# Patient Record
Sex: MALE | Race: WHITE | Employment: PART TIME | URBAN - METROPOLITAN AREA
[De-identification: names, ages, dates, MRNs, and addresses within clinical notes are randomized per-mention and may not be internally consistent; named-entity substitution may affect disease eponyms.]

---

## 2017-01-18 ENCOUNTER — TELEPHONE (OUTPATIENT)
Dept: INTERNAL MEDICINE CLINIC | Age: 77
End: 2017-01-18

## 2017-01-18 RX ORDER — SPIRONOLACTONE 25 MG/1
TABLET ORAL
Qty: 90 TABLET | Refills: 1 | Status: SHIPPED | OUTPATIENT
Start: 2017-01-18 | End: 2017-06-26 | Stop reason: SDUPTHER

## 2017-01-18 RX ORDER — FUROSEMIDE 40 MG/1
TABLET ORAL
Qty: 90 TABLET | Refills: 1 | Status: SHIPPED | OUTPATIENT
Start: 2017-01-18 | End: 2017-02-22 | Stop reason: DRUGHIGH

## 2017-01-18 RX ORDER — CARVEDILOL 25 MG/1
25 TABLET ORAL 2 TIMES DAILY WITH MEALS
Qty: 180 TABLET | Refills: 1 | Status: SHIPPED | OUTPATIENT
Start: 2017-01-18 | End: 2017-06-26 | Stop reason: SDUPTHER

## 2017-01-18 RX ORDER — ATORVASTATIN CALCIUM 80 MG/1
80 TABLET, FILM COATED ORAL DAILY
Qty: 90 TABLET | Refills: 1 | Status: SHIPPED | OUTPATIENT
Start: 2017-01-18 | End: 2017-06-26 | Stop reason: SDUPTHER

## 2017-01-18 RX ORDER — AMIODARONE HYDROCHLORIDE 200 MG/1
200 TABLET ORAL DAILY
Qty: 90 TABLET | Refills: 1 | Status: SHIPPED | OUTPATIENT
Start: 2017-01-18 | End: 2017-05-26 | Stop reason: DRUGHIGH

## 2017-02-22 ENCOUNTER — TELEPHONE (OUTPATIENT)
Dept: INTERNAL MEDICINE CLINIC | Age: 77
End: 2017-02-22

## 2017-02-22 ENCOUNTER — OFFICE VISIT (OUTPATIENT)
Dept: INTERNAL MEDICINE CLINIC | Age: 77
End: 2017-02-22

## 2017-02-22 VITALS
WEIGHT: 223 LBS | DIASTOLIC BLOOD PRESSURE: 64 MMHG | HEART RATE: 54 BPM | RESPIRATION RATE: 16 BRPM | BODY MASS INDEX: 35.99 KG/M2 | SYSTOLIC BLOOD PRESSURE: 114 MMHG

## 2017-02-22 DIAGNOSIS — Z12.5 SPECIAL SCREENING FOR MALIGNANT NEOPLASM OF PROSTATE: ICD-10-CM

## 2017-02-22 DIAGNOSIS — I50.32 CHRONIC DIASTOLIC CHF (CONGESTIVE HEART FAILURE) (HCC): ICD-10-CM

## 2017-02-22 DIAGNOSIS — I48.0 PAROXYSMAL ATRIAL FIBRILLATION (HCC): ICD-10-CM

## 2017-02-22 DIAGNOSIS — R73.9 HYPERGLYCEMIA: ICD-10-CM

## 2017-02-22 DIAGNOSIS — I10 ESSENTIAL HYPERTENSION: Primary | ICD-10-CM

## 2017-02-22 LAB
ANION GAP SERPL CALCULATED.3IONS-SCNC: 16 MMOL/L (ref 3–16)
BASOPHILS ABSOLUTE: 0 K/UL (ref 0–0.2)
BASOPHILS RELATIVE PERCENT: 0.4 %
BUN BLDV-MCNC: 31 MG/DL (ref 7–20)
CALCIUM SERPL-MCNC: 8.9 MG/DL (ref 8.3–10.6)
CHLORIDE BLD-SCNC: 99 MMOL/L (ref 99–110)
CO2: 25 MMOL/L (ref 21–32)
CREAT SERPL-MCNC: 1.5 MG/DL (ref 0.8–1.3)
EOSINOPHILS ABSOLUTE: 0.2 K/UL (ref 0–0.6)
EOSINOPHILS RELATIVE PERCENT: 2.6 %
GFR AFRICAN AMERICAN: 55
GFR NON-AFRICAN AMERICAN: 46
GLUCOSE BLD-MCNC: 131 MG/DL (ref 70–99)
HBA1C MFR BLD: 6.3 %
HCT VFR BLD CALC: 44.4 % (ref 40.5–52.5)
HEMOGLOBIN: 14.7 G/DL (ref 13.5–17.5)
LYMPHOCYTES ABSOLUTE: 2 K/UL (ref 1–5.1)
LYMPHOCYTES RELATIVE PERCENT: 22.7 %
MCH RBC QN AUTO: 31.9 PG (ref 26–34)
MCHC RBC AUTO-ENTMCNC: 33.1 G/DL (ref 31–36)
MCV RBC AUTO: 96.2 FL (ref 80–100)
MONOCYTES ABSOLUTE: 0.9 K/UL (ref 0–1.3)
MONOCYTES RELATIVE PERCENT: 10.6 %
NEUTROPHILS ABSOLUTE: 5.7 K/UL (ref 1.7–7.7)
NEUTROPHILS RELATIVE PERCENT: 63.7 %
PDW BLD-RTO: 13.4 % (ref 12.4–15.4)
PLATELET # BLD: 136 K/UL (ref 135–450)
PMV BLD AUTO: 10.2 FL (ref 5–10.5)
POTASSIUM SERPL-SCNC: 4.4 MMOL/L (ref 3.5–5.1)
PROSTATE SPECIFIC ANTIGEN: 0.64 NG/ML (ref 0–4)
RBC # BLD: 4.62 M/UL (ref 4.2–5.9)
SODIUM BLD-SCNC: 140 MMOL/L (ref 136–145)
WBC # BLD: 8.9 K/UL (ref 4–11)

## 2017-02-22 PROCEDURE — 36415 COLL VENOUS BLD VENIPUNCTURE: CPT | Performed by: INTERNAL MEDICINE

## 2017-02-22 PROCEDURE — 83036 HEMOGLOBIN GLYCOSYLATED A1C: CPT | Performed by: INTERNAL MEDICINE

## 2017-02-22 PROCEDURE — 99213 OFFICE O/P EST LOW 20 MIN: CPT | Performed by: INTERNAL MEDICINE

## 2017-02-22 RX ORDER — FUROSEMIDE 20 MG/1
TABLET ORAL
Qty: 1 TABLET | Refills: 0 | Status: SHIPPED | OUTPATIENT
Start: 2017-02-22 | End: 2017-10-10 | Stop reason: SDUPTHER

## 2017-02-22 ASSESSMENT — ENCOUNTER SYMPTOMS
SORE THROAT: 0
SHORTNESS OF BREATH: 0
COUGH: 0
CHEST TIGHTNESS: 0
WHEEZING: 0
TROUBLE SWALLOWING: 0
GASTROINTESTINAL NEGATIVE: 1

## 2017-02-23 ENCOUNTER — TELEPHONE (OUTPATIENT)
Dept: INTERNAL MEDICINE CLINIC | Age: 77
End: 2017-02-23

## 2017-05-02 ENCOUNTER — HOSPITAL ENCOUNTER (OUTPATIENT)
Dept: ENDOSCOPY | Age: 77
Discharge: HOME | End: 2017-05-02
Attending: INTERNAL MEDICINE | Admitting: INTERNAL MEDICINE

## 2017-05-02 VITALS
SYSTOLIC BLOOD PRESSURE: 123 MMHG | OXYGEN SATURATION: 95 % | TEMPERATURE: 97.1 F | DIASTOLIC BLOOD PRESSURE: 54 MMHG | RESPIRATION RATE: 16 BRPM | HEART RATE: 46 BPM

## 2017-05-02 RX ORDER — SODIUM CHLORIDE 9 MG/ML
INJECTION, SOLUTION INTRAVENOUS CONTINUOUS
Status: DISCONTINUED | OUTPATIENT
Start: 2017-05-02 | End: 2017-05-03 | Stop reason: HOSPADM

## 2017-05-02 RX ADMIN — SODIUM CHLORIDE: 9 INJECTION, SOLUTION INTRAVENOUS at 07:39

## 2017-05-02 ASSESSMENT — PAIN SCALES - GENERAL
PAINLEVEL_OUTOF10: 0
PAINLEVEL_OUTOF10: 0

## 2017-05-02 ASSESSMENT — PAIN - FUNCTIONAL ASSESSMENT: PAIN_FUNCTIONAL_ASSESSMENT: 0-10

## 2017-05-18 ENCOUNTER — TELEPHONE (OUTPATIENT)
Dept: INTERNAL MEDICINE CLINIC | Age: 77
End: 2017-05-18

## 2017-05-26 ENCOUNTER — OFFICE VISIT (OUTPATIENT)
Dept: INTERNAL MEDICINE CLINIC | Age: 77
End: 2017-05-26

## 2017-05-26 VITALS
WEIGHT: 233 LBS | DIASTOLIC BLOOD PRESSURE: 70 MMHG | HEART RATE: 47 BPM | BODY MASS INDEX: 39.99 KG/M2 | RESPIRATION RATE: 16 BRPM | SYSTOLIC BLOOD PRESSURE: 110 MMHG | OXYGEN SATURATION: 96 %

## 2017-05-26 DIAGNOSIS — I48.0 PAROXYSMAL ATRIAL FIBRILLATION (HCC): ICD-10-CM

## 2017-05-26 DIAGNOSIS — I47.1 SVT (SUPRAVENTRICULAR TACHYCARDIA) (HCC): ICD-10-CM

## 2017-05-26 DIAGNOSIS — I10 ESSENTIAL HYPERTENSION: Primary | ICD-10-CM

## 2017-05-26 PROCEDURE — 3288F FALL RISK ASSESSMENT DOCD: CPT | Performed by: INTERNAL MEDICINE

## 2017-05-26 PROCEDURE — 99213 OFFICE O/P EST LOW 20 MIN: CPT | Performed by: INTERNAL MEDICINE

## 2017-05-26 RX ORDER — AMIODARONE HYDROCHLORIDE 200 MG/1
100 TABLET ORAL DAILY
Qty: 1 TABLET | Refills: 0 | Status: SHIPPED
Start: 2017-05-26 | End: 2017-06-26 | Stop reason: SDUPTHER

## 2017-05-26 ASSESSMENT — ENCOUNTER SYMPTOMS
SHORTNESS OF BREATH: 0
GASTROINTESTINAL NEGATIVE: 1
CHEST TIGHTNESS: 0
COUGH: 0
WHEEZING: 0

## 2017-06-26 RX ORDER — CARVEDILOL 25 MG/1
TABLET ORAL
Qty: 180 TABLET | Refills: 1 | Status: SHIPPED | OUTPATIENT
Start: 2017-06-26 | End: 2017-11-14 | Stop reason: SDUPTHER

## 2017-06-26 RX ORDER — SPIRONOLACTONE 25 MG/1
TABLET ORAL
Qty: 90 TABLET | Refills: 1 | Status: SHIPPED | OUTPATIENT
Start: 2017-06-26 | End: 2017-11-14 | Stop reason: SDUPTHER

## 2017-06-26 RX ORDER — AMIODARONE HYDROCHLORIDE 200 MG/1
TABLET ORAL
Qty: 90 TABLET | Refills: 1 | Status: SHIPPED | OUTPATIENT
Start: 2017-06-26 | End: 2017-11-14 | Stop reason: SDUPTHER

## 2017-06-26 RX ORDER — ATORVASTATIN CALCIUM 80 MG/1
TABLET, FILM COATED ORAL
Qty: 90 TABLET | Refills: 1 | Status: SHIPPED | OUTPATIENT
Start: 2017-06-26 | End: 2017-11-14 | Stop reason: SDUPTHER

## 2017-08-03 ENCOUNTER — TELEPHONE (OUTPATIENT)
Dept: INTERNAL MEDICINE CLINIC | Age: 77
End: 2017-08-03

## 2017-09-01 ENCOUNTER — OFFICE VISIT (OUTPATIENT)
Dept: INTERNAL MEDICINE CLINIC | Age: 77
End: 2017-09-01

## 2017-09-01 VITALS
HEIGHT: 64 IN | BODY MASS INDEX: 39.78 KG/M2 | SYSTOLIC BLOOD PRESSURE: 130 MMHG | DIASTOLIC BLOOD PRESSURE: 70 MMHG | HEART RATE: 60 BPM | OXYGEN SATURATION: 95 % | RESPIRATION RATE: 16 BRPM | WEIGHT: 233 LBS

## 2017-09-01 DIAGNOSIS — I10 ESSENTIAL HYPERTENSION: Primary | ICD-10-CM

## 2017-09-01 DIAGNOSIS — R73.9 HYPERGLYCEMIA: ICD-10-CM

## 2017-09-01 DIAGNOSIS — I50.32 CHRONIC DIASTOLIC CHF (CONGESTIVE HEART FAILURE) (HCC): ICD-10-CM

## 2017-09-01 LAB — HBA1C MFR BLD: 6.4 %

## 2017-09-01 PROCEDURE — G8510 SCR DEP NEG, NO PLAN REQD: HCPCS | Performed by: INTERNAL MEDICINE

## 2017-09-01 PROCEDURE — 83036 HEMOGLOBIN GLYCOSYLATED A1C: CPT | Performed by: INTERNAL MEDICINE

## 2017-09-01 PROCEDURE — 99213 OFFICE O/P EST LOW 20 MIN: CPT | Performed by: INTERNAL MEDICINE

## 2017-09-01 ASSESSMENT — ENCOUNTER SYMPTOMS
COUGH: 0
SHORTNESS OF BREATH: 0
WHEEZING: 0
CHEST TIGHTNESS: 0
TROUBLE SWALLOWING: 0
GASTROINTESTINAL NEGATIVE: 1

## 2017-09-01 ASSESSMENT — PATIENT HEALTH QUESTIONNAIRE - PHQ9
1. LITTLE INTEREST OR PLEASURE IN DOING THINGS: 0
2. FEELING DOWN, DEPRESSED OR HOPELESS: 0
SUM OF ALL RESPONSES TO PHQ9 QUESTIONS 1 & 2: 0
SUM OF ALL RESPONSES TO PHQ QUESTIONS 1-9: 0

## 2017-09-06 ENCOUNTER — TELEPHONE (OUTPATIENT)
Dept: INTERNAL MEDICINE CLINIC | Age: 77
End: 2017-09-06

## 2017-09-06 RX ORDER — DICLOFENAC SODIUM 75 MG/1
75 TABLET, DELAYED RELEASE ORAL 2 TIMES DAILY PRN
Qty: 60 TABLET | Refills: 0 | Status: SHIPPED | OUTPATIENT
Start: 2017-09-06 | End: 2018-02-22 | Stop reason: SDUPTHER

## 2017-10-10 ENCOUNTER — TELEPHONE (OUTPATIENT)
Dept: INTERNAL MEDICINE CLINIC | Age: 77
End: 2017-10-10

## 2017-10-11 RX ORDER — FUROSEMIDE 20 MG/1
TABLET ORAL
Qty: 30 TABLET | Refills: 5 | Status: SHIPPED | OUTPATIENT
Start: 2017-10-11 | End: 2018-03-06 | Stop reason: SDUPTHER

## 2017-11-01 ENCOUNTER — NURSE ONLY (OUTPATIENT)
Dept: INTERNAL MEDICINE CLINIC | Age: 77
End: 2017-11-01

## 2017-11-01 DIAGNOSIS — Z23 FLU VACCINE NEED: Primary | ICD-10-CM

## 2017-11-01 PROCEDURE — 90662 IIV NO PRSV INCREASED AG IM: CPT | Performed by: INTERNAL MEDICINE

## 2017-11-01 PROCEDURE — G0008 ADMIN INFLUENZA VIRUS VAC: HCPCS | Performed by: INTERNAL MEDICINE

## 2017-11-14 RX ORDER — AMIODARONE HYDROCHLORIDE 200 MG/1
TABLET ORAL
Qty: 90 TABLET | Refills: 1 | Status: SHIPPED | OUTPATIENT
Start: 2017-11-14 | End: 2018-04-07 | Stop reason: SDUPTHER

## 2017-11-14 RX ORDER — SPIRONOLACTONE 25 MG/1
TABLET ORAL
Qty: 90 TABLET | Refills: 1 | Status: SHIPPED | OUTPATIENT
Start: 2017-11-14 | End: 2018-04-07 | Stop reason: SDUPTHER

## 2017-11-14 RX ORDER — CARVEDILOL 25 MG/1
TABLET ORAL
Qty: 180 TABLET | Refills: 1 | Status: SHIPPED | OUTPATIENT
Start: 2017-11-14 | End: 2018-04-07 | Stop reason: SDUPTHER

## 2017-11-14 RX ORDER — ATORVASTATIN CALCIUM 80 MG/1
TABLET, FILM COATED ORAL
Qty: 90 TABLET | Refills: 1 | Status: SHIPPED | OUTPATIENT
Start: 2017-11-14 | End: 2018-04-07 | Stop reason: SDUPTHER

## 2017-12-06 ENCOUNTER — OFFICE VISIT (OUTPATIENT)
Dept: INTERNAL MEDICINE CLINIC | Age: 77
End: 2017-12-06

## 2017-12-06 VITALS
WEIGHT: 234 LBS | HEIGHT: 64 IN | RESPIRATION RATE: 16 BRPM | DIASTOLIC BLOOD PRESSURE: 80 MMHG | SYSTOLIC BLOOD PRESSURE: 120 MMHG | OXYGEN SATURATION: 96 % | HEART RATE: 76 BPM | BODY MASS INDEX: 39.95 KG/M2

## 2017-12-06 DIAGNOSIS — I48.0 PAROXYSMAL ATRIAL FIBRILLATION (HCC): ICD-10-CM

## 2017-12-06 DIAGNOSIS — I10 ESSENTIAL HYPERTENSION: Primary | ICD-10-CM

## 2017-12-06 DIAGNOSIS — I50.32 CHRONIC DIASTOLIC CHF (CONGESTIVE HEART FAILURE) (HCC): ICD-10-CM

## 2017-12-06 DIAGNOSIS — Z12.5 SPECIAL SCREENING FOR MALIGNANT NEOPLASM OF PROSTATE: ICD-10-CM

## 2017-12-06 DIAGNOSIS — E78.5 HYPERLIPIDEMIA, UNSPECIFIED HYPERLIPIDEMIA TYPE: ICD-10-CM

## 2017-12-06 PROCEDURE — 3017F COLORECTAL CA SCREEN DOC REV: CPT | Performed by: INTERNAL MEDICINE

## 2017-12-06 PROCEDURE — 4040F PNEUMOC VAC/ADMIN/RCVD: CPT | Performed by: INTERNAL MEDICINE

## 2017-12-06 PROCEDURE — 1123F ACP DISCUSS/DSCN MKR DOCD: CPT | Performed by: INTERNAL MEDICINE

## 2017-12-06 PROCEDURE — 1036F TOBACCO NON-USER: CPT | Performed by: INTERNAL MEDICINE

## 2017-12-06 PROCEDURE — G8427 DOCREV CUR MEDS BY ELIG CLIN: HCPCS | Performed by: INTERNAL MEDICINE

## 2017-12-06 PROCEDURE — 99213 OFFICE O/P EST LOW 20 MIN: CPT | Performed by: INTERNAL MEDICINE

## 2017-12-06 PROCEDURE — G8484 FLU IMMUNIZE NO ADMIN: HCPCS | Performed by: INTERNAL MEDICINE

## 2017-12-06 PROCEDURE — G8417 CALC BMI ABV UP PARAM F/U: HCPCS | Performed by: INTERNAL MEDICINE

## 2017-12-06 ASSESSMENT — ENCOUNTER SYMPTOMS
SHORTNESS OF BREATH: 0
WHEEZING: 0
GASTROINTESTINAL NEGATIVE: 1
CHEST TIGHTNESS: 0
COUGH: 0

## 2017-12-06 NOTE — PROGRESS NOTES
Subjective:      Patient ID: Bishop Mckeon is a 78 y.o. male.htn chf and afib    HPI  Feels fien and ha s no new symptoms  Ha s no symptoms with activity  Has no orthopnea or pnd  Has no other cp gi or gu symptoms  Review of Systems   Constitutional: Negative for activity change, appetite change and unexpected weight change. Respiratory: Negative for cough, chest tightness, shortness of breath and wheezing. Cardiovascular: Negative for chest pain, palpitations and leg swelling. Gastrointestinal: Negative. Genitourinary: Negative. Neurological: Negative for dizziness, light-headedness and headaches. Objective:   Physical Exam   Constitutional: He is oriented to person, place, and time. No distress. Eyes: Conjunctivae and EOM are normal. Pupils are equal, round, and reactive to light. No scleral icterus. Neck: No JVD present. No thyromegaly present. Cardiovascular: Normal rate, regular rhythm, normal heart sounds and intact distal pulses. Exam reveals no gallop. No murmur heard. Pulmonary/Chest: Breath sounds normal. No respiratory distress. He has no wheezes. He has no rales. Musculoskeletal: He exhibits no edema. Lymphadenopathy:     He has no cervical adenopathy. Neurological: He is alert and oriented to person, place, and time. Nursing note and vitals reviewed. Assessment:      htn controlled  Hyperglycemia stable with A1C 6/4 and ha snot incraesed   afib- in regular rhythm and on amiodarone  chf well compensated   Plan:       Will check labs =fasting   Continue current medications and see in  3 months

## 2017-12-08 ENCOUNTER — NURSE ONLY (OUTPATIENT)
Dept: INTERNAL MEDICINE CLINIC | Age: 77
End: 2017-12-08

## 2017-12-08 DIAGNOSIS — I48.0 PAROXYSMAL ATRIAL FIBRILLATION (HCC): ICD-10-CM

## 2017-12-08 DIAGNOSIS — Z12.5 SPECIAL SCREENING FOR MALIGNANT NEOPLASM OF PROSTATE: ICD-10-CM

## 2017-12-08 DIAGNOSIS — I10 ESSENTIAL HYPERTENSION: ICD-10-CM

## 2017-12-08 DIAGNOSIS — E78.5 HYPERLIPIDEMIA, UNSPECIFIED HYPERLIPIDEMIA TYPE: ICD-10-CM

## 2017-12-08 LAB
ALBUMIN SERPL-MCNC: 4.1 G/DL (ref 3.4–5)
ALP BLD-CCNC: 56 U/L (ref 40–129)
ALT SERPL-CCNC: 15 U/L (ref 10–40)
ANION GAP SERPL CALCULATED.3IONS-SCNC: 13 MMOL/L (ref 3–16)
AST SERPL-CCNC: 14 U/L (ref 15–37)
BASOPHILS ABSOLUTE: 0 K/UL (ref 0–0.2)
BASOPHILS RELATIVE PERCENT: 0.5 %
BILIRUB SERPL-MCNC: 0.5 MG/DL (ref 0–1)
BILIRUBIN DIRECT: <0.2 MG/DL (ref 0–0.3)
BILIRUBIN, INDIRECT: ABNORMAL MG/DL (ref 0–1)
BUN BLDV-MCNC: 32 MG/DL (ref 7–20)
CALCIUM SERPL-MCNC: 8.8 MG/DL (ref 8.3–10.6)
CHLORIDE BLD-SCNC: 104 MMOL/L (ref 99–110)
CHOLESTEROL, TOTAL: 145 MG/DL (ref 0–199)
CO2: 26 MMOL/L (ref 21–32)
CREAT SERPL-MCNC: 1.3 MG/DL (ref 0.8–1.3)
EOSINOPHILS ABSOLUTE: 0.2 K/UL (ref 0–0.6)
EOSINOPHILS RELATIVE PERCENT: 3 %
GFR AFRICAN AMERICAN: >60
GFR NON-AFRICAN AMERICAN: 54
GLUCOSE BLD-MCNC: 137 MG/DL (ref 70–99)
HCT VFR BLD CALC: 43.2 % (ref 40.5–52.5)
HDLC SERPL-MCNC: 43 MG/DL (ref 40–60)
HEMOGLOBIN: 14.3 G/DL (ref 13.5–17.5)
LDL CHOLESTEROL CALCULATED: 72 MG/DL
LYMPHOCYTES ABSOLUTE: 1.6 K/UL (ref 1–5.1)
LYMPHOCYTES RELATIVE PERCENT: 24 %
MCH RBC QN AUTO: 31.6 PG (ref 26–34)
MCHC RBC AUTO-ENTMCNC: 33.1 G/DL (ref 31–36)
MCV RBC AUTO: 95.4 FL (ref 80–100)
MONOCYTES ABSOLUTE: 0.9 K/UL (ref 0–1.3)
MONOCYTES RELATIVE PERCENT: 12.9 %
NEUTROPHILS ABSOLUTE: 3.9 K/UL (ref 1.7–7.7)
NEUTROPHILS RELATIVE PERCENT: 59.6 %
PDW BLD-RTO: 13.7 % (ref 12.4–15.4)
PLATELET # BLD: 139 K/UL (ref 135–450)
PMV BLD AUTO: 9.9 FL (ref 5–10.5)
POTASSIUM SERPL-SCNC: 4.2 MMOL/L (ref 3.5–5.1)
PROSTATE SPECIFIC ANTIGEN: 0.5 NG/ML (ref 0–4)
RBC # BLD: 4.53 M/UL (ref 4.2–5.9)
SODIUM BLD-SCNC: 143 MMOL/L (ref 136–145)
TOTAL PROTEIN: 6.7 G/DL (ref 6.4–8.2)
TRIGL SERPL-MCNC: 150 MG/DL (ref 0–150)
TSH SERPL DL<=0.05 MIU/L-ACNC: 3.46 UIU/ML (ref 0.27–4.2)
VLDLC SERPL CALC-MCNC: 30 MG/DL
WBC # BLD: 6.6 K/UL (ref 4–11)

## 2017-12-08 PROCEDURE — 36415 COLL VENOUS BLD VENIPUNCTURE: CPT | Performed by: INTERNAL MEDICINE

## 2018-02-22 RX ORDER — DICLOFENAC SODIUM 75 MG/1
75 TABLET, DELAYED RELEASE ORAL 2 TIMES DAILY PRN
Qty: 60 TABLET | Refills: 0 | Status: SHIPPED | OUTPATIENT
Start: 2018-02-22 | End: 2018-06-22 | Stop reason: SDUPTHER

## 2018-03-06 RX ORDER — FUROSEMIDE 20 MG/1
TABLET ORAL
Qty: 90 TABLET | Refills: 1 | Status: SHIPPED | OUTPATIENT
Start: 2018-03-06 | End: 2018-03-16 | Stop reason: SINTOL

## 2018-03-16 ENCOUNTER — OFFICE VISIT (OUTPATIENT)
Dept: INTERNAL MEDICINE CLINIC | Age: 78
End: 2018-03-16

## 2018-03-16 VITALS
WEIGHT: 235 LBS | HEART RATE: 60 BPM | HEIGHT: 64 IN | BODY MASS INDEX: 40.12 KG/M2 | OXYGEN SATURATION: 97 % | SYSTOLIC BLOOD PRESSURE: 120 MMHG | RESPIRATION RATE: 18 BRPM | DIASTOLIC BLOOD PRESSURE: 50 MMHG

## 2018-03-16 DIAGNOSIS — I10 ESSENTIAL HYPERTENSION: Primary | ICD-10-CM

## 2018-03-16 DIAGNOSIS — I48.0 PAROXYSMAL ATRIAL FIBRILLATION (HCC): ICD-10-CM

## 2018-03-16 DIAGNOSIS — I47.1 SVT (SUPRAVENTRICULAR TACHYCARDIA) (HCC): ICD-10-CM

## 2018-03-16 DIAGNOSIS — I50.32 CHRONIC DIASTOLIC CHF (CONGESTIVE HEART FAILURE) (HCC): ICD-10-CM

## 2018-03-16 PROCEDURE — 4040F PNEUMOC VAC/ADMIN/RCVD: CPT | Performed by: INTERNAL MEDICINE

## 2018-03-16 PROCEDURE — G8427 DOCREV CUR MEDS BY ELIG CLIN: HCPCS | Performed by: INTERNAL MEDICINE

## 2018-03-16 PROCEDURE — 3017F COLORECTAL CA SCREEN DOC REV: CPT | Performed by: INTERNAL MEDICINE

## 2018-03-16 PROCEDURE — 99213 OFFICE O/P EST LOW 20 MIN: CPT | Performed by: INTERNAL MEDICINE

## 2018-03-16 PROCEDURE — G8482 FLU IMMUNIZE ORDER/ADMIN: HCPCS | Performed by: INTERNAL MEDICINE

## 2018-03-16 PROCEDURE — 1036F TOBACCO NON-USER: CPT | Performed by: INTERNAL MEDICINE

## 2018-03-16 PROCEDURE — G8417 CALC BMI ABV UP PARAM F/U: HCPCS | Performed by: INTERNAL MEDICINE

## 2018-03-16 PROCEDURE — 1123F ACP DISCUSS/DSCN MKR DOCD: CPT | Performed by: INTERNAL MEDICINE

## 2018-03-16 ASSESSMENT — ENCOUNTER SYMPTOMS
WHEEZING: 0
GASTROINTESTINAL NEGATIVE: 1
COUGH: 0
SHORTNESS OF BREATH: 0
CHEST TIGHTNESS: 0

## 2018-04-09 RX ORDER — ATORVASTATIN CALCIUM 80 MG/1
TABLET, FILM COATED ORAL
Qty: 90 TABLET | Refills: 3 | Status: SHIPPED | OUTPATIENT
Start: 2018-04-09 | End: 2019-02-26 | Stop reason: SDUPTHER

## 2018-04-09 RX ORDER — AMIODARONE HYDROCHLORIDE 200 MG/1
TABLET ORAL
Qty: 90 TABLET | Refills: 3 | Status: SHIPPED | OUTPATIENT
Start: 2018-04-09 | End: 2019-02-26 | Stop reason: SDUPTHER

## 2018-04-09 RX ORDER — CARVEDILOL 25 MG/1
TABLET ORAL
Qty: 180 TABLET | Refills: 3 | Status: SHIPPED | OUTPATIENT
Start: 2018-04-09 | End: 2019-02-26 | Stop reason: SDUPTHER

## 2018-04-09 RX ORDER — SPIRONOLACTONE 25 MG/1
TABLET ORAL
Qty: 90 TABLET | Refills: 3 | Status: SHIPPED | OUTPATIENT
Start: 2018-04-09 | End: 2019-02-26 | Stop reason: SDUPTHER

## 2018-05-11 ENCOUNTER — OFFICE VISIT (OUTPATIENT)
Dept: INTERNAL MEDICINE CLINIC | Age: 78
End: 2018-05-11

## 2018-05-11 VITALS
HEIGHT: 64 IN | WEIGHT: 231 LBS | RESPIRATION RATE: 16 BRPM | SYSTOLIC BLOOD PRESSURE: 118 MMHG | BODY MASS INDEX: 39.44 KG/M2 | HEART RATE: 74 BPM | OXYGEN SATURATION: 96 % | DIASTOLIC BLOOD PRESSURE: 62 MMHG

## 2018-05-11 DIAGNOSIS — J98.01 BRONCHOSPASM: Primary | ICD-10-CM

## 2018-05-11 PROCEDURE — G8417 CALC BMI ABV UP PARAM F/U: HCPCS | Performed by: INTERNAL MEDICINE

## 2018-05-11 PROCEDURE — 3017F COLORECTAL CA SCREEN DOC REV: CPT | Performed by: INTERNAL MEDICINE

## 2018-05-11 PROCEDURE — 99213 OFFICE O/P EST LOW 20 MIN: CPT | Performed by: INTERNAL MEDICINE

## 2018-05-11 PROCEDURE — 4040F PNEUMOC VAC/ADMIN/RCVD: CPT | Performed by: INTERNAL MEDICINE

## 2018-05-11 PROCEDURE — G8427 DOCREV CUR MEDS BY ELIG CLIN: HCPCS | Performed by: INTERNAL MEDICINE

## 2018-05-11 PROCEDURE — 1036F TOBACCO NON-USER: CPT | Performed by: INTERNAL MEDICINE

## 2018-05-11 PROCEDURE — 1123F ACP DISCUSS/DSCN MKR DOCD: CPT | Performed by: INTERNAL MEDICINE

## 2018-05-11 RX ORDER — METHYLPREDNISOLONE 4 MG/1
TABLET ORAL
Qty: 1 KIT | Refills: 0 | Status: SHIPPED | OUTPATIENT
Start: 2018-05-11 | End: 2018-05-17

## 2018-06-18 ENCOUNTER — OFFICE VISIT (OUTPATIENT)
Dept: INTERNAL MEDICINE CLINIC | Age: 78
End: 2018-06-18

## 2018-06-18 VITALS
OXYGEN SATURATION: 95 % | BODY MASS INDEX: 39.09 KG/M2 | WEIGHT: 229 LBS | RESPIRATION RATE: 16 BRPM | SYSTOLIC BLOOD PRESSURE: 120 MMHG | HEIGHT: 64 IN | DIASTOLIC BLOOD PRESSURE: 64 MMHG | HEART RATE: 54 BPM

## 2018-06-18 DIAGNOSIS — I48.0 PAROXYSMAL ATRIAL FIBRILLATION (HCC): ICD-10-CM

## 2018-06-18 DIAGNOSIS — R73.9 HYPERGLYCEMIA: ICD-10-CM

## 2018-06-18 DIAGNOSIS — I10 ESSENTIAL HYPERTENSION: Primary | ICD-10-CM

## 2018-06-18 DIAGNOSIS — I50.32 CHRONIC DIASTOLIC CHF (CONGESTIVE HEART FAILURE) (HCC): ICD-10-CM

## 2018-06-18 LAB
ANION GAP SERPL CALCULATED.3IONS-SCNC: 17 MMOL/L (ref 3–16)
BASOPHILS ABSOLUTE: 0 K/UL (ref 0–0.2)
BASOPHILS RELATIVE PERCENT: 0.5 %
BUN BLDV-MCNC: 29 MG/DL (ref 7–20)
CALCIUM SERPL-MCNC: 8.9 MG/DL (ref 8.3–10.6)
CHLORIDE BLD-SCNC: 104 MMOL/L (ref 99–110)
CO2: 22 MMOL/L (ref 21–32)
CREAT SERPL-MCNC: 1.2 MG/DL (ref 0.8–1.3)
EOSINOPHILS ABSOLUTE: 0.1 K/UL (ref 0–0.6)
EOSINOPHILS RELATIVE PERCENT: 1.9 %
GFR AFRICAN AMERICAN: >60
GFR NON-AFRICAN AMERICAN: 59
GLUCOSE BLD-MCNC: 133 MG/DL (ref 70–99)
HBA1C MFR BLD: 6.4 %
HCT VFR BLD CALC: 42.1 % (ref 40.5–52.5)
HEMOGLOBIN: 14.5 G/DL (ref 13.5–17.5)
LYMPHOCYTES ABSOLUTE: 1.8 K/UL (ref 1–5.1)
LYMPHOCYTES RELATIVE PERCENT: 24.8 %
MCH RBC QN AUTO: 32.3 PG (ref 26–34)
MCHC RBC AUTO-ENTMCNC: 34.4 G/DL (ref 31–36)
MCV RBC AUTO: 94 FL (ref 80–100)
MONOCYTES ABSOLUTE: 0.8 K/UL (ref 0–1.3)
MONOCYTES RELATIVE PERCENT: 10.8 %
NEUTROPHILS ABSOLUTE: 4.5 K/UL (ref 1.7–7.7)
NEUTROPHILS RELATIVE PERCENT: 62 %
PDW BLD-RTO: 13.9 % (ref 12.4–15.4)
PLATELET # BLD: 144 K/UL (ref 135–450)
PMV BLD AUTO: 9.8 FL (ref 5–10.5)
POTASSIUM SERPL-SCNC: 4.3 MMOL/L (ref 3.5–5.1)
RBC # BLD: 4.48 M/UL (ref 4.2–5.9)
SODIUM BLD-SCNC: 143 MMOL/L (ref 136–145)
WBC # BLD: 7.3 K/UL (ref 4–11)

## 2018-06-18 PROCEDURE — G8417 CALC BMI ABV UP PARAM F/U: HCPCS | Performed by: INTERNAL MEDICINE

## 2018-06-18 PROCEDURE — 83036 HEMOGLOBIN GLYCOSYLATED A1C: CPT | Performed by: INTERNAL MEDICINE

## 2018-06-18 PROCEDURE — 3017F COLORECTAL CA SCREEN DOC REV: CPT | Performed by: INTERNAL MEDICINE

## 2018-06-18 PROCEDURE — 1036F TOBACCO NON-USER: CPT | Performed by: INTERNAL MEDICINE

## 2018-06-18 PROCEDURE — 1123F ACP DISCUSS/DSCN MKR DOCD: CPT | Performed by: INTERNAL MEDICINE

## 2018-06-18 PROCEDURE — 4040F PNEUMOC VAC/ADMIN/RCVD: CPT | Performed by: INTERNAL MEDICINE

## 2018-06-18 PROCEDURE — 36415 COLL VENOUS BLD VENIPUNCTURE: CPT | Performed by: INTERNAL MEDICINE

## 2018-06-18 PROCEDURE — 99213 OFFICE O/P EST LOW 20 MIN: CPT | Performed by: INTERNAL MEDICINE

## 2018-06-18 PROCEDURE — G8427 DOCREV CUR MEDS BY ELIG CLIN: HCPCS | Performed by: INTERNAL MEDICINE

## 2018-06-18 ASSESSMENT — ENCOUNTER SYMPTOMS
SHORTNESS OF BREATH: 0
WHEEZING: 0
CHEST TIGHTNESS: 0
COUGH: 0
GASTROINTESTINAL NEGATIVE: 1

## 2018-06-22 RX ORDER — DICLOFENAC SODIUM 75 MG/1
75 TABLET, DELAYED RELEASE ORAL 2 TIMES DAILY PRN
Qty: 60 TABLET | Refills: 0 | Status: SHIPPED | OUTPATIENT
Start: 2018-06-22 | End: 2019-03-18 | Stop reason: SDUPTHER

## 2018-10-02 ENCOUNTER — APPOINTMENT (OUTPATIENT)
Dept: GENERAL RADIOLOGY | Age: 78
End: 2018-10-02
Payer: MEDICARE

## 2018-10-02 ENCOUNTER — TELEPHONE (OUTPATIENT)
Dept: ORTHOPEDIC SURGERY | Age: 78
End: 2018-10-02

## 2018-10-02 ENCOUNTER — HOSPITAL ENCOUNTER (EMERGENCY)
Age: 78
Discharge: HOME | End: 2018-10-02
Payer: MEDICARE

## 2018-10-02 VITALS
HEART RATE: 58 BPM | SYSTOLIC BLOOD PRESSURE: 134 MMHG | DIASTOLIC BLOOD PRESSURE: 69 MMHG | RESPIRATION RATE: 16 BRPM | TEMPERATURE: 97.9 F | OXYGEN SATURATION: 96 %

## 2018-10-02 DIAGNOSIS — S32.599A CLOSED STABLE FRACTURE OF MULTIPLE PUBIC RAMI (HCC): Primary | ICD-10-CM

## 2018-10-02 PROCEDURE — 99284 EMERGENCY DEPT VISIT MOD MDM: CPT

## 2018-10-02 PROCEDURE — 71101 X-RAY EXAM UNILAT RIBS/CHEST: CPT

## 2018-10-02 PROCEDURE — 73502 X-RAY EXAM HIP UNI 2-3 VIEWS: CPT

## 2018-10-02 RX ORDER — HYDROCODONE BITARTRATE AND ACETAMINOPHEN 5; 325 MG/1; MG/1
1 TABLET ORAL EVERY 6 HOURS PRN
Qty: 20 TABLET | Refills: 0 | Status: SHIPPED | OUTPATIENT
Start: 2018-10-02 | End: 2018-10-09

## 2018-10-02 ASSESSMENT — PAIN SCALES - GENERAL
PAINLEVEL_OUTOF10: 8
PAINLEVEL_OUTOF10: 9

## 2018-10-02 ASSESSMENT — PAIN DESCRIPTION - PAIN TYPE: TYPE: ACUTE PAIN

## 2018-10-02 ASSESSMENT — PAIN DESCRIPTION - LOCATION: LOCATION: HIP;RIB CAGE

## 2018-10-02 ASSESSMENT — PAIN DESCRIPTION - ORIENTATION: ORIENTATION: LEFT

## 2018-10-02 NOTE — TELEPHONE ENCOUNTER
Janeth Er Call   Bed 51C   Pubic ramus fx  Refer PA Steve Cuevas   [phone]  This was given to KATHIA HURST

## 2018-10-02 NOTE — ED PROVIDER NOTES
Relation Status    Mother         sudden death   Libby Zelaya Father (Not Specified)    Sister (Not Specified)        SOCIAL HISTORY      reports that he has quit smoking. He has never used smokeless tobacco. He reports that he drinks about 1.2 oz of alcohol per week . He reports that he does not use drugs. PHYSICAL EXAM    (up to 7 for level 4, 8 or more for level 5)     ED Triage Vitals [10/02/18 1312]   BP Temp Temp Source Pulse Resp SpO2 Height Weight   (!) 147/60 97.9 °F (36.6 °C) Oral 54 16 97 % -- --       Constitutional:  Appearing well-developed and well-nourished. No distress. HENT:  Normocephalic and atraumatic. Cardiovascular:  Normal rate, regular rhythm, normal heart sounds and intact distal pulses. Pulmonary/Chest:  Effort normal and breath sounds normal. No respiratory distress. Musculoskeletal:  Superficial abrasion noted to the left anterior lateral chest over the inferior ribs, tender to palpation in this area. Tenderness over the left buttock in the general area of the SI joint, negative for tenderness to the left hip otherwise. Normal range of motion. No edema exhibited. Sensation to light touch grossly intact and capillary refill <3 seconds in the lower extremities bilaterally. Neurological:  Oriented to person, place, and time. No cranial nerve deficit. Skin:  Skin is warm and dry. Not diaphoretic. Psychiatric:  Normal mood, affect, behavior, judgment and thought content. DIAGNOSTIC RESULTS     RADIOLOGY:   Interpretation per the Radiologist below, if available at the time of this note:    XR RIBS LEFT INCLUDE CHEST (MIN 3 VIEWS)   Final Result   1. No radiographic evidence of acute intrathoracic findings. 2. No definite rib fractures are seen. XR HIP LEFT (2-3 VIEWS)   Final Result   No acute abnormality of the left hip.       Acute nondisplaced fracture of the left inferior pubic ramus, and suggestion   of nearby nondisplaced fracture of the superior pubic ramus.             LABS:  Labs Reviewed - No data to display    All other labs were within normal range or not returned as of this dictation. EMERGENCY DEPARTMENT COURSE and DIFFERENTIAL DIAGNOSIS/MDM:   Vitals:    Vitals:    10/02/18 1312   BP: (!) 147/60   Pulse: 54   Resp: 16   Temp: 97.9 °F (36.6 °C)   TempSrc: Oral   SpO2: 97%       The patient's condition in the ED was good, the patient was afebrile and nontoxic in appearance, and the patient's physical exam was unremarkable other than for the tenderness noted above. X-ray of the chest and left ribs showed no acute abnormalities. Lasted x-ray revealed nondisplaced fractures of the inferior and superior pubic rami, no other bony abnormalities. Patient was able to stand and ambulate while in the ED, but experiencing pain with this. I consulted the orthopedic specialist on call, spoke with the physician assistant, Michael Coffey, who advised that the patient was safe for discharge with orthopedic follow-up if he did not need admission for placement. There was no indication for hospitalization or further workup. Patient will be discharged with a prescription for pain medication and a referral for orthopedic follow-up care. He was advised to call promptly to schedule appointment, and patient asked whether he might reveal to see an orthopedic specialist whom he already knows, and I advised that this acceptable as long as he sees an orthopedic physician. The patient verbalized understanding and agreement with this plan of care. The patient was advised to return to the emergency department if symptoms should significantly worsen or if new and concerning symptoms should appear. I estimate there is LOW risk for FRACTURE, COMPARTMENT SYNDROME, DEEP VENOUS THROMBOSIS, SEPTIC ARTHRITIS, TENDON OR NEUROVASCULAR INJURY, thus I consider the discharge disposition reasonable. PROCEDURES:  None    FINAL IMPRESSION      1.  Closed stable fracture of multiple pubic rami [facility])          DISPOSITION/PLAN   DISPOSITION Decision To Discharge 10/02/2018 03:10:28 PM      PATIENT REFERRED TO:  Raymond Leon MD  [place] [address]  [phone]    Schedule an appointment as soon as possible for a visit   For orthopedic follow-up care    or your orthopedic doctor of choice    Call   For follow-up care      DISCHARGE MEDICATIONS:  New Prescriptions    HYDROCODONE-ACETAMINOPHEN (NORCO) 5-325 MG PER TABLET    Take 1 tablet by mouth every 6 hours as needed for Pain for up to 7 days. .       (Please note that portions of this note were completed with a voice recognition program.  Efforts were made to edit the dictations but occasionally words are mis-transcribed.)    Jacqueline House, [address] [place], [place]  10/02/18 [id number]

## 2018-10-03 ENCOUNTER — TELEPHONE (OUTPATIENT)
Dept: INTERNAL MEDICINE CLINIC | Age: 78
End: 2018-10-03

## 2018-10-17 ENCOUNTER — OFFICE VISIT (OUTPATIENT)
Dept: INTERNAL MEDICINE CLINIC | Age: 78
End: 2018-10-17
Payer: MEDICARE

## 2018-10-17 VITALS
HEIGHT: 64 IN | SYSTOLIC BLOOD PRESSURE: 120 MMHG | OXYGEN SATURATION: 95 % | BODY MASS INDEX: 39.44 KG/M2 | RESPIRATION RATE: 16 BRPM | HEART RATE: 54 BPM | DIASTOLIC BLOOD PRESSURE: 70 MMHG | WEIGHT: 231 LBS

## 2018-10-17 DIAGNOSIS — I10 ESSENTIAL HYPERTENSION: Primary | ICD-10-CM

## 2018-10-17 DIAGNOSIS — Z23 NEED FOR INFLUENZA VACCINATION: ICD-10-CM

## 2018-10-17 DIAGNOSIS — S32.502D CLOSED NONDISPLACED FRACTURE OF LEFT PUBIS WITH ROUTINE HEALING: ICD-10-CM

## 2018-10-17 PROCEDURE — 1101F PT FALLS ASSESS-DOCD LE1/YR: CPT | Performed by: INTERNAL MEDICINE

## 2018-10-17 PROCEDURE — 3017F COLORECTAL CA SCREEN DOC REV: CPT | Performed by: INTERNAL MEDICINE

## 2018-10-17 PROCEDURE — 1123F ACP DISCUSS/DSCN MKR DOCD: CPT | Performed by: INTERNAL MEDICINE

## 2018-10-17 PROCEDURE — G8484 FLU IMMUNIZE NO ADMIN: HCPCS | Performed by: INTERNAL MEDICINE

## 2018-10-17 PROCEDURE — 90662 IIV NO PRSV INCREASED AG IM: CPT | Performed by: INTERNAL MEDICINE

## 2018-10-17 PROCEDURE — G8417 CALC BMI ABV UP PARAM F/U: HCPCS | Performed by: INTERNAL MEDICINE

## 2018-10-17 PROCEDURE — G0008 ADMIN INFLUENZA VIRUS VAC: HCPCS | Performed by: INTERNAL MEDICINE

## 2018-10-17 PROCEDURE — 99213 OFFICE O/P EST LOW 20 MIN: CPT | Performed by: INTERNAL MEDICINE

## 2018-10-17 PROCEDURE — G8427 DOCREV CUR MEDS BY ELIG CLIN: HCPCS | Performed by: INTERNAL MEDICINE

## 2018-10-17 PROCEDURE — G8510 SCR DEP NEG, NO PLAN REQD: HCPCS | Performed by: INTERNAL MEDICINE

## 2018-10-17 PROCEDURE — 1036F TOBACCO NON-USER: CPT | Performed by: INTERNAL MEDICINE

## 2018-10-17 PROCEDURE — 4040F PNEUMOC VAC/ADMIN/RCVD: CPT | Performed by: INTERNAL MEDICINE

## 2018-10-17 ASSESSMENT — PATIENT HEALTH QUESTIONNAIRE - PHQ9
2. FEELING DOWN, DEPRESSED OR HOPELESS: 0
SUM OF ALL RESPONSES TO PHQ9 QUESTIONS 1 & 2: 0
1. LITTLE INTEREST OR PLEASURE IN DOING THINGS: 0
SUM OF ALL RESPONSES TO PHQ QUESTIONS 1-9: 0
SUM OF ALL RESPONSES TO PHQ QUESTIONS 1-9: 0

## 2018-10-17 ASSESSMENT — ENCOUNTER SYMPTOMS: GASTROINTESTINAL NEGATIVE: 1

## 2018-11-12 ENCOUNTER — OFFICE VISIT (OUTPATIENT)
Dept: INTERNAL MEDICINE CLINIC | Age: 78
End: 2018-11-12
Payer: MEDICARE

## 2018-11-12 VITALS
DIASTOLIC BLOOD PRESSURE: 84 MMHG | OXYGEN SATURATION: 98 % | BODY MASS INDEX: 39.65 KG/M2 | TEMPERATURE: 97.5 F | HEART RATE: 95 BPM | RESPIRATION RATE: 18 BRPM | SYSTOLIC BLOOD PRESSURE: 128 MMHG | HEIGHT: 64 IN

## 2018-11-12 DIAGNOSIS — J40 BRONCHITIS: Primary | ICD-10-CM

## 2018-11-12 PROCEDURE — G8482 FLU IMMUNIZE ORDER/ADMIN: HCPCS | Performed by: INTERNAL MEDICINE

## 2018-11-12 PROCEDURE — 99213 OFFICE O/P EST LOW 20 MIN: CPT | Performed by: INTERNAL MEDICINE

## 2018-11-12 PROCEDURE — 1036F TOBACCO NON-USER: CPT | Performed by: INTERNAL MEDICINE

## 2018-11-12 PROCEDURE — 1101F PT FALLS ASSESS-DOCD LE1/YR: CPT | Performed by: INTERNAL MEDICINE

## 2018-11-12 PROCEDURE — G8427 DOCREV CUR MEDS BY ELIG CLIN: HCPCS | Performed by: INTERNAL MEDICINE

## 2018-11-12 PROCEDURE — 4040F PNEUMOC VAC/ADMIN/RCVD: CPT | Performed by: INTERNAL MEDICINE

## 2018-11-12 PROCEDURE — 3017F COLORECTAL CA SCREEN DOC REV: CPT | Performed by: INTERNAL MEDICINE

## 2018-11-12 PROCEDURE — G8417 CALC BMI ABV UP PARAM F/U: HCPCS | Performed by: INTERNAL MEDICINE

## 2018-11-12 PROCEDURE — 1123F ACP DISCUSS/DSCN MKR DOCD: CPT | Performed by: INTERNAL MEDICINE

## 2018-11-12 RX ORDER — DOXYCYCLINE HYCLATE 100 MG
100 TABLET ORAL 2 TIMES DAILY
Qty: 14 TABLET | Refills: 0 | Status: SHIPPED | OUTPATIENT
Start: 2018-11-12 | End: 2018-11-19

## 2018-11-12 ASSESSMENT — ENCOUNTER SYMPTOMS
SHORTNESS OF BREATH: 0
WHEEZING: 0
COUGH: 1

## 2019-01-14 ENCOUNTER — OFFICE VISIT (OUTPATIENT)
Dept: INTERNAL MEDICINE CLINIC | Age: 79
End: 2019-01-14
Payer: MEDICARE

## 2019-01-14 VITALS
WEIGHT: 238 LBS | DIASTOLIC BLOOD PRESSURE: 82 MMHG | OXYGEN SATURATION: 97 % | HEART RATE: 55 BPM | SYSTOLIC BLOOD PRESSURE: 130 MMHG | BODY MASS INDEX: 40.85 KG/M2 | TEMPERATURE: 98 F

## 2019-01-14 DIAGNOSIS — H00.011 HORDEOLUM EXTERNUM OF RIGHT UPPER EYELID: ICD-10-CM

## 2019-01-14 PROCEDURE — 4040F PNEUMOC VAC/ADMIN/RCVD: CPT | Performed by: INTERNAL MEDICINE

## 2019-01-14 PROCEDURE — G8482 FLU IMMUNIZE ORDER/ADMIN: HCPCS | Performed by: INTERNAL MEDICINE

## 2019-01-14 PROCEDURE — G8427 DOCREV CUR MEDS BY ELIG CLIN: HCPCS | Performed by: INTERNAL MEDICINE

## 2019-01-14 PROCEDURE — 1036F TOBACCO NON-USER: CPT | Performed by: INTERNAL MEDICINE

## 2019-01-14 PROCEDURE — G8417 CALC BMI ABV UP PARAM F/U: HCPCS | Performed by: INTERNAL MEDICINE

## 2019-01-14 PROCEDURE — 99212 OFFICE O/P EST SF 10 MIN: CPT | Performed by: INTERNAL MEDICINE

## 2019-01-14 PROCEDURE — 1123F ACP DISCUSS/DSCN MKR DOCD: CPT | Performed by: INTERNAL MEDICINE

## 2019-01-14 PROCEDURE — 1101F PT FALLS ASSESS-DOCD LE1/YR: CPT | Performed by: INTERNAL MEDICINE

## 2019-01-21 ENCOUNTER — OFFICE VISIT (OUTPATIENT)
Dept: INTERNAL MEDICINE CLINIC | Age: 79
End: 2019-01-21
Payer: MEDICARE

## 2019-01-21 VITALS
HEART RATE: 60 BPM | HEIGHT: 64 IN | SYSTOLIC BLOOD PRESSURE: 120 MMHG | WEIGHT: 236 LBS | OXYGEN SATURATION: 97 % | DIASTOLIC BLOOD PRESSURE: 60 MMHG | BODY MASS INDEX: 40.29 KG/M2 | RESPIRATION RATE: 16 BRPM

## 2019-01-21 DIAGNOSIS — I50.32 CHRONIC DIASTOLIC CHF (CONGESTIVE HEART FAILURE) (HCC): ICD-10-CM

## 2019-01-21 DIAGNOSIS — I48.0 PAROXYSMAL ATRIAL FIBRILLATION (HCC): ICD-10-CM

## 2019-01-21 DIAGNOSIS — E66.01 MORBID OBESITY WITH BMI OF 40.0-44.9, ADULT (HCC): ICD-10-CM

## 2019-01-21 DIAGNOSIS — I47.1 SVT (SUPRAVENTRICULAR TACHYCARDIA) (HCC): ICD-10-CM

## 2019-01-21 DIAGNOSIS — I10 ESSENTIAL HYPERTENSION: Primary | ICD-10-CM

## 2019-01-21 DIAGNOSIS — R73.9 HYPERGLYCEMIA: ICD-10-CM

## 2019-01-21 LAB
GLUCOSE BLD-MCNC: 106 MG/DL
HBA1C MFR BLD: 6.2 %

## 2019-01-21 PROCEDURE — 1123F ACP DISCUSS/DSCN MKR DOCD: CPT | Performed by: INTERNAL MEDICINE

## 2019-01-21 PROCEDURE — 1036F TOBACCO NON-USER: CPT | Performed by: INTERNAL MEDICINE

## 2019-01-21 PROCEDURE — G8427 DOCREV CUR MEDS BY ELIG CLIN: HCPCS | Performed by: INTERNAL MEDICINE

## 2019-01-21 PROCEDURE — G8482 FLU IMMUNIZE ORDER/ADMIN: HCPCS | Performed by: INTERNAL MEDICINE

## 2019-01-21 PROCEDURE — G8417 CALC BMI ABV UP PARAM F/U: HCPCS | Performed by: INTERNAL MEDICINE

## 2019-01-21 PROCEDURE — 99214 OFFICE O/P EST MOD 30 MIN: CPT | Performed by: INTERNAL MEDICINE

## 2019-01-21 PROCEDURE — 83036 HEMOGLOBIN GLYCOSYLATED A1C: CPT | Performed by: INTERNAL MEDICINE

## 2019-01-21 PROCEDURE — 82962 GLUCOSE BLOOD TEST: CPT | Performed by: INTERNAL MEDICINE

## 2019-01-21 PROCEDURE — 1101F PT FALLS ASSESS-DOCD LE1/YR: CPT | Performed by: INTERNAL MEDICINE

## 2019-01-21 PROCEDURE — 4040F PNEUMOC VAC/ADMIN/RCVD: CPT | Performed by: INTERNAL MEDICINE

## 2019-01-21 ASSESSMENT — ENCOUNTER SYMPTOMS
CHEST TIGHTNESS: 0
GASTROINTESTINAL NEGATIVE: 1
WHEEZING: 0
COUGH: 0
SHORTNESS OF BREATH: 0
TROUBLE SWALLOWING: 0

## 2019-02-27 RX ORDER — SPIRONOLACTONE 25 MG/1
TABLET ORAL
Qty: 90 TABLET | Refills: 1 | Status: SHIPPED | OUTPATIENT
Start: 2019-02-27 | End: 2019-09-06 | Stop reason: SDUPTHER

## 2019-02-27 RX ORDER — CARVEDILOL 25 MG/1
TABLET ORAL
Qty: 180 TABLET | Refills: 1 | Status: SHIPPED | OUTPATIENT
Start: 2019-02-27 | End: 2019-09-06 | Stop reason: SDUPTHER

## 2019-02-27 RX ORDER — ATORVASTATIN CALCIUM 80 MG/1
TABLET, FILM COATED ORAL
Qty: 90 TABLET | Refills: 1 | Status: SHIPPED | OUTPATIENT
Start: 2019-02-27 | End: 2019-09-06 | Stop reason: SDUPTHER

## 2019-02-27 RX ORDER — AMIODARONE HYDROCHLORIDE 200 MG/1
TABLET ORAL
Qty: 90 TABLET | Refills: 1 | Status: SHIPPED | OUTPATIENT
Start: 2019-02-27 | End: 2019-09-06 | Stop reason: SDUPTHER

## 2019-03-18 RX ORDER — DICLOFENAC SODIUM 75 MG/1
TABLET, DELAYED RELEASE ORAL
Qty: 60 TABLET | Refills: 0 | Status: SHIPPED | OUTPATIENT
Start: 2019-03-18 | End: 2019-06-27 | Stop reason: SDUPTHER

## 2019-04-29 ENCOUNTER — OFFICE VISIT (OUTPATIENT)
Dept: INTERNAL MEDICINE CLINIC | Age: 79
End: 2019-04-29
Payer: MEDICARE

## 2019-04-29 VITALS
SYSTOLIC BLOOD PRESSURE: 120 MMHG | BODY MASS INDEX: 36.8 KG/M2 | DIASTOLIC BLOOD PRESSURE: 70 MMHG | HEIGHT: 66 IN | TEMPERATURE: 96.8 F | RESPIRATION RATE: 16 BRPM | WEIGHT: 229 LBS | OXYGEN SATURATION: 95 % | HEART RATE: 56 BPM

## 2019-04-29 DIAGNOSIS — I10 ESSENTIAL HYPERTENSION: Primary | ICD-10-CM

## 2019-04-29 DIAGNOSIS — J30.2 SEASONAL ALLERGIC RHINITIS, UNSPECIFIED TRIGGER: ICD-10-CM

## 2019-04-29 DIAGNOSIS — Z12.5 SPECIAL SCREENING FOR MALIGNANT NEOPLASM OF PROSTATE: ICD-10-CM

## 2019-04-29 DIAGNOSIS — E78.5 HYPERLIPIDEMIA, UNSPECIFIED HYPERLIPIDEMIA TYPE: ICD-10-CM

## 2019-04-29 PROCEDURE — 90471 IMMUNIZATION ADMIN: CPT | Performed by: INTERNAL MEDICINE

## 2019-04-29 PROCEDURE — 1036F TOBACCO NON-USER: CPT | Performed by: INTERNAL MEDICINE

## 2019-04-29 PROCEDURE — G8427 DOCREV CUR MEDS BY ELIG CLIN: HCPCS | Performed by: INTERNAL MEDICINE

## 2019-04-29 PROCEDURE — 4040F PNEUMOC VAC/ADMIN/RCVD: CPT | Performed by: INTERNAL MEDICINE

## 2019-04-29 PROCEDURE — G8417 CALC BMI ABV UP PARAM F/U: HCPCS | Performed by: INTERNAL MEDICINE

## 2019-04-29 PROCEDURE — 1123F ACP DISCUSS/DSCN MKR DOCD: CPT | Performed by: INTERNAL MEDICINE

## 2019-04-29 PROCEDURE — 99213 OFFICE O/P EST LOW 20 MIN: CPT | Performed by: INTERNAL MEDICINE

## 2019-04-29 PROCEDURE — 90714 TD VACC NO PRESV 7 YRS+ IM: CPT | Performed by: INTERNAL MEDICINE

## 2019-04-29 ASSESSMENT — ENCOUNTER SYMPTOMS
RHINORRHEA: 1
SHORTNESS OF BREATH: 0
GASTROINTESTINAL NEGATIVE: 1
CHEST TIGHTNESS: 0
SORE THROAT: 0
COUGH: 0
WHEEZING: 0
TROUBLE SWALLOWING: 0

## 2019-04-29 NOTE — PATIENT INSTRUCTIONS
need fasting labs  TD vaccine  Today. Continue current medications  Take Claritin 1 tablet daily for 3-4 weeks-if not better ,call me.   See in  3 months

## 2019-04-29 NOTE — PROGRESS NOTES
Subjective:      Patient ID: Evette Lopez is a 70 y.o. male. HPI  For 2-3 weeks has sneezing and nasal congestion  Has no cough and may have wheezing occasionally  Has no symptoms with activity  Has no orthopnea or  pnd  Has no other cp gi or gu symptoms    Review of Systems   Constitutional: Negative for activity change, appetite change and unexpected weight change. HENT: Positive for postnasal drip, rhinorrhea and sneezing. Negative for nosebleeds, sore throat and trouble swallowing. Respiratory: Negative for cough, chest tightness, shortness of breath and wheezing. Cardiovascular: Negative for chest pain, palpitations and leg swelling. Gastrointestinal: Negative. Genitourinary: Negative. Neurological: Negative for dizziness, light-headedness and headaches. Objective:   Physical Exam   Constitutional: He is oriented to person, place, and time. No distress. HENT:   Mouth/Throat: Oropharynx is clear and moist. No oropharyngeal exudate (has nasal mucosal congestion and swelling). Eyes: Pupils are equal, round, and reactive to light. Conjunctivae and EOM are normal. No scleral icterus. Neck: No JVD present. No thyromegaly present. Cardiovascular: Normal rate, regular rhythm, normal heart sounds and intact distal pulses. Exam reveals no gallop. No murmur heard. Pulmonary/Chest: Breath sounds normal. No respiratory distress. He has no wheezes. He has no rales. Musculoskeletal: He exhibits no edema. Lymphadenopathy:     He has no cervical adenopathy. Neurological: He is alert and oriented to person, place, and time. Nursing note and vitals reviewed. Assessment:      Has symptoms of allergic rhinitis    htn controlled  Atrial fibrillation -in sinus rhythm  Plan:    need fasting labs  TD vaccine  Today. Continue current medications  Take Claritin 1 tablet daily for 3-4 weeks-if not better ,call me.   See in  3 months        Brayan Rushing MD

## 2019-05-02 ENCOUNTER — NURSE ONLY (OUTPATIENT)
Dept: INTERNAL MEDICINE CLINIC | Age: 79
End: 2019-05-02
Payer: MEDICARE

## 2019-05-02 DIAGNOSIS — Z12.5 SPECIAL SCREENING FOR MALIGNANT NEOPLASM OF PROSTATE: ICD-10-CM

## 2019-05-02 DIAGNOSIS — E78.5 HYPERLIPIDEMIA, UNSPECIFIED HYPERLIPIDEMIA TYPE: ICD-10-CM

## 2019-05-02 DIAGNOSIS — I10 ESSENTIAL HYPERTENSION: ICD-10-CM

## 2019-05-02 LAB
ALBUMIN SERPL-MCNC: 4.3 G/DL (ref 3.4–5)
ALP BLD-CCNC: 56 U/L (ref 40–129)
ALT SERPL-CCNC: 13 U/L (ref 10–40)
ANION GAP SERPL CALCULATED.3IONS-SCNC: 16 MMOL/L (ref 3–16)
AST SERPL-CCNC: 12 U/L (ref 15–37)
BASOPHILS ABSOLUTE: 0 K/UL (ref 0–0.2)
BASOPHILS RELATIVE PERCENT: 0.4 %
BILIRUB SERPL-MCNC: 0.7 MG/DL (ref 0–1)
BILIRUBIN DIRECT: <0.2 MG/DL (ref 0–0.3)
BILIRUBIN, INDIRECT: ABNORMAL MG/DL (ref 0–1)
BUN BLDV-MCNC: 26 MG/DL (ref 7–20)
CALCIUM SERPL-MCNC: 8.8 MG/DL (ref 8.3–10.6)
CHLORIDE BLD-SCNC: 102 MMOL/L (ref 99–110)
CHOLESTEROL, TOTAL: 141 MG/DL (ref 0–199)
CO2: 24 MMOL/L (ref 21–32)
CREAT SERPL-MCNC: 1.2 MG/DL (ref 0.8–1.3)
EOSINOPHILS ABSOLUTE: 0.2 K/UL (ref 0–0.6)
EOSINOPHILS RELATIVE PERCENT: 2.7 %
GFR AFRICAN AMERICAN: >60
GFR NON-AFRICAN AMERICAN: 59
GLUCOSE BLD-MCNC: 133 MG/DL (ref 70–99)
HCT VFR BLD CALC: 42.1 % (ref 40.5–52.5)
HDLC SERPL-MCNC: 35 MG/DL (ref 40–60)
HEMOGLOBIN: 14.3 G/DL (ref 13.5–17.5)
LDL CHOLESTEROL CALCULATED: 73 MG/DL
LYMPHOCYTES ABSOLUTE: 1.7 K/UL (ref 1–5.1)
LYMPHOCYTES RELATIVE PERCENT: 23.1 %
MCH RBC QN AUTO: 31.5 PG (ref 26–34)
MCHC RBC AUTO-ENTMCNC: 33.9 G/DL (ref 31–36)
MCV RBC AUTO: 92.8 FL (ref 80–100)
MONOCYTES ABSOLUTE: 0.8 K/UL (ref 0–1.3)
MONOCYTES RELATIVE PERCENT: 11.6 %
NEUTROPHILS ABSOLUTE: 4.5 K/UL (ref 1.7–7.7)
NEUTROPHILS RELATIVE PERCENT: 62.2 %
PDW BLD-RTO: 13.6 % (ref 12.4–15.4)
PLATELET # BLD: 160 K/UL (ref 135–450)
PMV BLD AUTO: 9.7 FL (ref 5–10.5)
POTASSIUM SERPL-SCNC: 4.2 MMOL/L (ref 3.5–5.1)
PROSTATE SPECIFIC ANTIGEN: 0.83 NG/ML (ref 0–4)
RBC # BLD: 4.53 M/UL (ref 4.2–5.9)
SODIUM BLD-SCNC: 142 MMOL/L (ref 136–145)
TOTAL PROTEIN: 6.9 G/DL (ref 6.4–8.2)
TRIGL SERPL-MCNC: 164 MG/DL (ref 0–150)
VLDLC SERPL CALC-MCNC: 33 MG/DL
WBC # BLD: 7.3 K/UL (ref 4–11)

## 2019-05-02 PROCEDURE — 36415 COLL VENOUS BLD VENIPUNCTURE: CPT | Performed by: INTERNAL MEDICINE

## 2019-05-15 ENCOUNTER — OFFICE VISIT (OUTPATIENT)
Dept: INTERNAL MEDICINE CLINIC | Age: 79
End: 2019-05-15
Payer: MEDICARE

## 2019-05-15 VITALS
HEART RATE: 52 BPM | TEMPERATURE: 97.4 F | OXYGEN SATURATION: 97 % | BODY MASS INDEX: 36.96 KG/M2 | SYSTOLIC BLOOD PRESSURE: 124 MMHG | DIASTOLIC BLOOD PRESSURE: 74 MMHG | WEIGHT: 229 LBS

## 2019-05-15 DIAGNOSIS — J20.9 ACUTE BRONCHITIS WITH BRONCHOSPASM: ICD-10-CM

## 2019-05-15 PROCEDURE — 1123F ACP DISCUSS/DSCN MKR DOCD: CPT | Performed by: INTERNAL MEDICINE

## 2019-05-15 PROCEDURE — G8417 CALC BMI ABV UP PARAM F/U: HCPCS | Performed by: INTERNAL MEDICINE

## 2019-05-15 PROCEDURE — 1036F TOBACCO NON-USER: CPT | Performed by: INTERNAL MEDICINE

## 2019-05-15 PROCEDURE — 99213 OFFICE O/P EST LOW 20 MIN: CPT | Performed by: INTERNAL MEDICINE

## 2019-05-15 PROCEDURE — G8427 DOCREV CUR MEDS BY ELIG CLIN: HCPCS | Performed by: INTERNAL MEDICINE

## 2019-05-15 PROCEDURE — 4040F PNEUMOC VAC/ADMIN/RCVD: CPT | Performed by: INTERNAL MEDICINE

## 2019-05-15 RX ORDER — METHYLPREDNISOLONE 4 MG/1
TABLET ORAL
Qty: 1 KIT | Refills: 0 | Status: SHIPPED | OUTPATIENT
Start: 2019-05-15 | End: 2019-05-21

## 2019-05-15 RX ORDER — AZITHROMYCIN 250 MG/1
250 TABLET, FILM COATED ORAL SEE ADMIN INSTRUCTIONS
Qty: 6 TABLET | Refills: 0 | Status: SHIPPED | OUTPATIENT
Start: 2019-05-15 | End: 2019-05-20

## 2019-05-15 NOTE — PROGRESS NOTES
Subjective:      Patient ID: Destinee Pressley is a 70 y.o. male. HPI  Still  has nasal congestion and drainage is yellow and has post nasal drip  At times has cough as he feels is due to post nasal drip and mostly during nights and feels like a tickle in throat  Has no fever or chills  Has no wheezing or dyspnea orthopnea or pnd. Review of Systems   Cardiovascular: Negative for chest pain, palpitations and leg swelling. Neurological: Negative for dizziness, light-headedness and headaches. Objective:   Physical Exam   Constitutional: He is oriented to person, place, and time. No distress. HENT:   Right Ear: External ear normal.   Left Ear: External ear normal.   Nose: Nose normal.   Mouth/Throat: Oropharynx is clear and moist. No oropharyngeal exudate (has no sinus tenderness). Eyes: Pupils are equal, round, and reactive to light. Conjunctivae and EOM are normal. No scleral icterus. Neck: No JVD present. Cardiovascular: Normal rate, regular rhythm and normal heart sounds. Exam reveals no gallop. No murmur heard. Pulmonary/Chest: Breath sounds normal. No respiratory distress. He has no wheezes (has forced exp cough or wheezing). He has no rales. Lymphadenopathy:     He has no cervical adenopathy. Neurological: He is alert and oriented to person, place, and time. Nursing note and vitals reviewed.       Assessment:      Symptoms are most likely due to bronchospasm and has no signs of infection except yellow sputum    May have acute bronchitis with bronchospasm  Plan:      Start on zithromax and take as directed on packet  Start on medrol dose pack and tale as directed on packet  See if not getting better-then may try steorid inhaler        Vik Gordon MD

## 2019-05-15 NOTE — PATIENT INSTRUCTIONS
Start on zithromax and take as directed on packet  Start on medrol dose pack and tale as directed on packet  See if not getting better-then may try steorid inhaler

## 2019-06-27 RX ORDER — DICLOFENAC SODIUM 75 MG/1
TABLET, DELAYED RELEASE ORAL
Qty: 60 TABLET | Refills: 0 | Status: SHIPPED | OUTPATIENT
Start: 2019-06-27 | End: 2019-07-25 | Stop reason: SDUPTHER

## 2019-06-27 NOTE — TELEPHONE ENCOUNTER
Refill request   Last office visit: 05/15/2019  Future Appointments   Date Time Provider Jose Figueredo   7/31/2019 10:00 AM Shayy Tate

## 2019-07-25 RX ORDER — DICLOFENAC SODIUM 75 MG/1
TABLET, DELAYED RELEASE ORAL
Qty: 60 TABLET | Refills: 1 | Status: SHIPPED | OUTPATIENT
Start: 2019-07-25 | End: 2020-07-15

## 2019-07-31 ENCOUNTER — OFFICE VISIT (OUTPATIENT)
Dept: INTERNAL MEDICINE CLINIC | Age: 79
End: 2019-07-31
Payer: MEDICARE

## 2019-07-31 VITALS
SYSTOLIC BLOOD PRESSURE: 130 MMHG | DIASTOLIC BLOOD PRESSURE: 60 MMHG | HEART RATE: 51 BPM | BODY MASS INDEX: 36.96 KG/M2 | OXYGEN SATURATION: 94 % | WEIGHT: 230 LBS | HEIGHT: 66 IN

## 2019-07-31 DIAGNOSIS — I50.32 CHRONIC DIASTOLIC CHF (CONGESTIVE HEART FAILURE) (HCC): ICD-10-CM

## 2019-07-31 DIAGNOSIS — I10 ESSENTIAL HYPERTENSION: Primary | ICD-10-CM

## 2019-07-31 DIAGNOSIS — R73.9 HYPERGLYCEMIA: ICD-10-CM

## 2019-07-31 PROBLEM — J20.9 ACUTE BRONCHITIS WITH BRONCHOSPASM: Status: RESOLVED | Noted: 2019-05-15 | Resolved: 2019-07-31

## 2019-07-31 LAB
CHP ED QC CHECK: NORMAL
GLUCOSE BLD-MCNC: 108 MG/DL
HBA1C MFR BLD: 6.4 %

## 2019-07-31 PROCEDURE — G8427 DOCREV CUR MEDS BY ELIG CLIN: HCPCS | Performed by: INTERNAL MEDICINE

## 2019-07-31 PROCEDURE — 83036 HEMOGLOBIN GLYCOSYLATED A1C: CPT | Performed by: INTERNAL MEDICINE

## 2019-07-31 PROCEDURE — 82962 GLUCOSE BLOOD TEST: CPT | Performed by: INTERNAL MEDICINE

## 2019-07-31 PROCEDURE — 4040F PNEUMOC VAC/ADMIN/RCVD: CPT | Performed by: INTERNAL MEDICINE

## 2019-07-31 PROCEDURE — 99213 OFFICE O/P EST LOW 20 MIN: CPT | Performed by: INTERNAL MEDICINE

## 2019-07-31 PROCEDURE — 1123F ACP DISCUSS/DSCN MKR DOCD: CPT | Performed by: INTERNAL MEDICINE

## 2019-07-31 PROCEDURE — 1036F TOBACCO NON-USER: CPT | Performed by: INTERNAL MEDICINE

## 2019-07-31 PROCEDURE — G8417 CALC BMI ABV UP PARAM F/U: HCPCS | Performed by: INTERNAL MEDICINE

## 2019-07-31 ASSESSMENT — ENCOUNTER SYMPTOMS
GASTROINTESTINAL NEGATIVE: 1
WHEEZING: 0
COUGH: 0
CHEST TIGHTNESS: 0
TROUBLE SWALLOWING: 0
SHORTNESS OF BREATH: 0

## 2019-07-31 ASSESSMENT — PATIENT HEALTH QUESTIONNAIRE - PHQ9
SUM OF ALL RESPONSES TO PHQ QUESTIONS 1-9: 0
2. FEELING DOWN, DEPRESSED OR HOPELESS: 0
SUM OF ALL RESPONSES TO PHQ QUESTIONS 1-9: 0
SUM OF ALL RESPONSES TO PHQ9 QUESTIONS 1 & 2: 0
1. LITTLE INTEREST OR PLEASURE IN DOING THINGS: 0

## 2019-09-06 RX ORDER — CARVEDILOL 25 MG/1
TABLET ORAL
Qty: 180 TABLET | Refills: 1 | Status: SHIPPED | OUTPATIENT
Start: 2019-09-06 | End: 2020-02-12

## 2019-09-06 RX ORDER — AMIODARONE HYDROCHLORIDE 200 MG/1
TABLET ORAL
Qty: 90 TABLET | Refills: 1 | Status: SHIPPED | OUTPATIENT
Start: 2019-09-06 | End: 2020-02-12

## 2019-09-06 RX ORDER — ATORVASTATIN CALCIUM 80 MG/1
TABLET, FILM COATED ORAL
Qty: 90 TABLET | Refills: 1 | Status: SHIPPED | OUTPATIENT
Start: 2019-09-06 | End: 2020-02-12

## 2019-09-06 RX ORDER — SPIRONOLACTONE 25 MG/1
TABLET ORAL
Qty: 90 TABLET | Refills: 1 | Status: SHIPPED | OUTPATIENT
Start: 2019-09-06 | End: 2020-02-13

## 2019-11-01 ENCOUNTER — OFFICE VISIT (OUTPATIENT)
Dept: INTERNAL MEDICINE CLINIC | Age: 79
End: 2019-11-01
Payer: MEDICARE

## 2019-11-01 VITALS
BODY MASS INDEX: 36.96 KG/M2 | OXYGEN SATURATION: 96 % | WEIGHT: 230 LBS | HEIGHT: 66 IN | SYSTOLIC BLOOD PRESSURE: 130 MMHG | HEART RATE: 47 BPM | DIASTOLIC BLOOD PRESSURE: 60 MMHG

## 2019-11-01 DIAGNOSIS — I50.32 CHRONIC DIASTOLIC CHF (CONGESTIVE HEART FAILURE) (HCC): ICD-10-CM

## 2019-11-01 DIAGNOSIS — I10 ESSENTIAL HYPERTENSION: Primary | ICD-10-CM

## 2019-11-01 PROCEDURE — 99213 OFFICE O/P EST LOW 20 MIN: CPT | Performed by: INTERNAL MEDICINE

## 2019-11-01 PROCEDURE — G8417 CALC BMI ABV UP PARAM F/U: HCPCS | Performed by: INTERNAL MEDICINE

## 2019-11-01 PROCEDURE — 1036F TOBACCO NON-USER: CPT | Performed by: INTERNAL MEDICINE

## 2019-11-01 PROCEDURE — 4040F PNEUMOC VAC/ADMIN/RCVD: CPT | Performed by: INTERNAL MEDICINE

## 2019-11-01 PROCEDURE — 1123F ACP DISCUSS/DSCN MKR DOCD: CPT | Performed by: INTERNAL MEDICINE

## 2019-11-01 PROCEDURE — G8427 DOCREV CUR MEDS BY ELIG CLIN: HCPCS | Performed by: INTERNAL MEDICINE

## 2019-11-01 PROCEDURE — G8482 FLU IMMUNIZE ORDER/ADMIN: HCPCS | Performed by: INTERNAL MEDICINE

## 2019-11-01 ASSESSMENT — ENCOUNTER SYMPTOMS
GASTROINTESTINAL NEGATIVE: 1
TROUBLE SWALLOWING: 0
SHORTNESS OF BREATH: 0
CHEST TIGHTNESS: 0
WHEEZING: 0
COUGH: 0

## 2020-02-07 ENCOUNTER — ANESTHESIA EVENT (OUTPATIENT)
Dept: ENDOSCOPY | Age: 80
End: 2020-02-07
Payer: MEDICARE

## 2020-02-11 ENCOUNTER — ANESTHESIA (OUTPATIENT)
Dept: ENDOSCOPY | Age: 80
End: 2020-02-11
Payer: MEDICARE

## 2020-02-11 ENCOUNTER — HOSPITAL ENCOUNTER (OUTPATIENT)
Age: 80
Setting detail: OUTPATIENT SURGERY
Discharge: HOME | End: 2020-02-11
Attending: INTERNAL MEDICINE | Admitting: INTERNAL MEDICINE
Payer: MEDICARE

## 2020-02-11 VITALS
HEART RATE: 55 BPM | RESPIRATION RATE: 14 BRPM | HEIGHT: 65 IN | DIASTOLIC BLOOD PRESSURE: 54 MMHG | WEIGHT: 224.6 LBS | OXYGEN SATURATION: 95 % | TEMPERATURE: 97.3 F | SYSTOLIC BLOOD PRESSURE: 126 MMHG | BODY MASS INDEX: 37.42 KG/M2

## 2020-02-11 VITALS — SYSTOLIC BLOOD PRESSURE: 104 MMHG | DIASTOLIC BLOOD PRESSURE: 53 MMHG | OXYGEN SATURATION: 93 %

## 2020-02-11 PROCEDURE — 3700000000 HC ANESTHESIA ATTENDED CARE: Performed by: INTERNAL MEDICINE

## 2020-02-11 PROCEDURE — 7100000010 HC PHASE II RECOVERY - FIRST 15 MIN: Performed by: INTERNAL MEDICINE

## 2020-02-11 PROCEDURE — 2709999900 HC NON-CHARGEABLE SUPPLY: Performed by: INTERNAL MEDICINE

## 2020-02-11 PROCEDURE — 88305 TISSUE EXAM BY PATHOLOGIST: CPT

## 2020-02-11 PROCEDURE — 3609010600 HC COLONOSCOPY POLYPECTOMY SNARE/COLD BIOPSY: Performed by: INTERNAL MEDICINE

## 2020-02-11 PROCEDURE — 2580000003 HC RX 258: Performed by: ANESTHESIOLOGY

## 2020-02-11 PROCEDURE — 3700000001 HC ADD 15 MINUTES (ANESTHESIA): Performed by: INTERNAL MEDICINE

## 2020-02-11 PROCEDURE — 7100000011 HC PHASE II RECOVERY - ADDTL 15 MIN: Performed by: INTERNAL MEDICINE

## 2020-02-11 PROCEDURE — 6360000002 HC RX W HCPCS: Performed by: NURSE ANESTHETIST, CERTIFIED REGISTERED

## 2020-02-11 PROCEDURE — 2580000003 HC RX 258: Performed by: NURSE ANESTHETIST, CERTIFIED REGISTERED

## 2020-02-11 PROCEDURE — 2500000003 HC RX 250 WO HCPCS: Performed by: NURSE ANESTHETIST, CERTIFIED REGISTERED

## 2020-02-11 RX ORDER — SODIUM CHLORIDE 0.9 % (FLUSH) 0.9 %
10 SYRINGE (ML) INJECTION PRN
Status: DISCONTINUED | OUTPATIENT
Start: 2020-02-11 | End: 2020-02-11 | Stop reason: HOSPADM

## 2020-02-11 RX ORDER — PROPOFOL 10 MG/ML
INJECTION, EMULSION INTRAVENOUS PRN
Status: DISCONTINUED | OUTPATIENT
Start: 2020-02-11 | End: 2020-02-11 | Stop reason: SDUPTHER

## 2020-02-11 RX ORDER — SODIUM CHLORIDE 9 MG/ML
INJECTION, SOLUTION INTRAVENOUS CONTINUOUS
Status: DISCONTINUED | OUTPATIENT
Start: 2020-02-11 | End: 2020-02-11 | Stop reason: HOSPADM

## 2020-02-11 RX ORDER — SODIUM CHLORIDE 9 MG/ML
INJECTION, SOLUTION INTRAVENOUS CONTINUOUS PRN
Status: DISCONTINUED | OUTPATIENT
Start: 2020-02-11 | End: 2020-02-11 | Stop reason: SDUPTHER

## 2020-02-11 RX ORDER — SODIUM CHLORIDE 0.9 % (FLUSH) 0.9 %
10 SYRINGE (ML) INJECTION EVERY 12 HOURS SCHEDULED
Status: DISCONTINUED | OUTPATIENT
Start: 2020-02-11 | End: 2020-02-11 | Stop reason: HOSPADM

## 2020-02-11 RX ORDER — LIDOCAINE HYDROCHLORIDE 20 MG/ML
INJECTION, SOLUTION EPIDURAL; INFILTRATION; INTRACAUDAL; PERINEURAL PRN
Status: DISCONTINUED | OUTPATIENT
Start: 2020-02-11 | End: 2020-02-11 | Stop reason: SDUPTHER

## 2020-02-11 RX ADMIN — PROPOFOL 50 MG: 10 INJECTION, EMULSION INTRAVENOUS at 08:06

## 2020-02-11 RX ADMIN — PROPOFOL 150 MG: 10 INJECTION, EMULSION INTRAVENOUS at 07:59

## 2020-02-11 RX ADMIN — SODIUM CHLORIDE: 0.9 INJECTION, SOLUTION INTRAVENOUS at 07:37

## 2020-02-11 RX ADMIN — PROPOFOL 50 MG: 10 INJECTION, EMULSION INTRAVENOUS at 08:14

## 2020-02-11 RX ADMIN — LIDOCAINE HYDROCHLORIDE 50 MG: 20 INJECTION, SOLUTION EPIDURAL; INFILTRATION; INTRACAUDAL; PERINEURAL at 07:59

## 2020-02-11 RX ADMIN — SODIUM CHLORIDE: 9 INJECTION, SOLUTION INTRAVENOUS at 07:55

## 2020-02-11 ASSESSMENT — PAIN SCALES - GENERAL
PAINLEVEL_OUTOF10: 0

## 2020-02-11 ASSESSMENT — PAIN - FUNCTIONAL ASSESSMENT: PAIN_FUNCTIONAL_ASSESSMENT: 0-10

## 2020-02-11 ASSESSMENT — PAIN SCALES - WONG BAKER: WONGBAKER_NUMERICALRESPONSE: 0

## 2020-02-11 ASSESSMENT — ENCOUNTER SYMPTOMS: SHORTNESS OF BREATH: 0

## 2020-02-11 NOTE — ANESTHESIA POSTPROCEDURE EVALUATION
05/02/2019 08:03 AM   RENAL  Lab Results       Component                Value               Date/Time                  NA                       142                 05/02/2019 08:03 AM        K                        4.2                 05/02/2019 08:03 AM        CL                       102                 05/02/2019 08:03 AM        CO2                      24                  05/02/2019 08:03 AM        BUN                      26 (H)              05/02/2019 08:03 AM        CREATININE               1.2                 05/02/2019 08:03 AM        GLUCOSE                  108                 07/31/2019 12:13 PM   COAGS  Lab Results       Component                Value               Date/Time                  PROTIME                  29.2 (H)            04/16/2012 03:05 PM        INR                      2.58 (H)            04/16/2012 03:05 PM        APTT                     40.9 (H)            12/06/2011 07:34 AM     Intake & Output:  @52WQK@    Nausea & Vomiting:  No    Level of Consciousness:  Awake    Pain Assessment:  Adequate analgesia    Anesthesia Complications:  No apparent anesthetic complications    SUMMARY      Vital signs stable  OK to discharge from Stage I post anesthesia care.   Care transferred from Anesthesiology department on discharge from perioperative area

## 2020-02-11 NOTE — H&P
resource strain: Not on file    Food insecurity:     Worry: Not on file     Inability: Not on file    Transportation needs:     Medical: Not on file     Non-medical: Not on file   Tobacco Use    Smoking status: Former Smoker     Packs/day: 0.25     Years: 5.00     Pack years: 1.25     Last attempt to quit: 10/1/1990     Years since quittin.3    Smokeless tobacco: Never Used    Tobacco comment: quit 25 years ago   Substance and Sexual Activity    Alcohol use: Yes     Alcohol/week: 2.0 standard drinks     Types: 1 Glasses of wine, 1 Cans of beer per week    Drug use: No    Sexual activity: Not on file   Lifestyle    Physical activity:     Days per week: Not on file     Minutes per session: Not on file    Stress: Not on file   Relationships    Social connections:     Talks on phone: Not on file     Gets together: Not on file     Attends [employer] service: Not on file     Active member of club or organization: Not on file     Attends meetings of clubs or organizations: Not on file     Relationship status: Not on file    Intimate partner violence:     Fear of current or ex partner: Not on file     Emotionally abused: Not on file     Physically abused: Not on file     Forced sexual activity: Not on file   Other Topics Concern    Not on file   Social History Narrative    Not on file     Family History   Problem Relation Age of Onset    Cancer Father         prostate    Diabetes Sister          PHYSICAL EXAM:      BP (!) 152/62   Pulse 99   Temp 97.1 °F (36.2 °C) (Temporal)   Resp 16   Ht 5' 5\" (1.651 m)   Wt 224 lb 9.6 oz (101.9 kg)   SpO2 97%   BMI 37.38 kg/m²  I        Heart:normal    Lungs: normal    Abdomen: normal      ASA Grade:  See anesthesia note      ASSESSMENT AND PLAN:    1. Procedure options, risks and benefits reviewed with patient and expresses understanding.

## 2020-02-11 NOTE — PROCEDURES
[facility]  Endoscopy Note    Patient: Adalgisa Ramirez  : 1940  Acct#: [de-identified]    Procedure: Colonoscopy with biopsy    Date:  2020    Surgeon:  Lore Khanna MD    Referring Physician:  Hebert Lawton    Previous Colonoscopy: Yes  Date: 17  Greater than 3 years? Yes    Preoperative Diagnosis:  surveillance    Postoperative Diagnosis:  Colon polyps    Anesthesia:  See anesthesia note    Indications: This is a 70y.o. year old male who presents today with previous adenomatous polyp. Procedure: An informed consent was obtained from the patient after explanation of indications, benefits, possible risks and complications of the procedure. The patient was then taken to the endoscopy suite, placed in the left lateral decubitus position, and the above IV anesthesia was administered. A digital rectal examination was performed and revealed negative without mass, lesions or tenderness. The Olympus CFQ-180-AL video colonoscope was placed in the patient's rectum under digital direction and advanced to the cecum. The cecum was identified by characteristic anatomy and ballottment. The ileocecal valve was identified. The preparation was fair. The scope was then withdrawn back through the cecum, ascending, transverse, descending and sigmoid colons. Carefull circumferential examination of the mucosa in these areas demonstrated three 2 to 7 mm polyps in the proximal ascending colon that were biopsied and removed. The scope was then withdrawn into the rectum and retroflexed. The retroflexed view of the anal verge and rectum demonstrates no abnormalities. The scope was straightened, the colon was decompressed and the scope was withdrawn from the patient. The patient tolerated the procedure well and was taken to the PACU in good condition. Estimated Blood Loss:  Minimal.    Impression:  Colon polyps    Recommendations:  Await pathology. Repeat colonoscopy in 3 years.     Perry Lancaster Montserrat Schulz, [address] [facility]  2/11/2020

## 2020-02-11 NOTE — ANESTHESIA PRE PROCEDURE
time: 20  BP  Min: 152/62   Min taken time: 20  Max: 152/62   Max taken time: 2026  SpO2  Av %  Min: 97 %   Min taken time: 20  Max: 97 %   Max taken time: 20  BP Readings from Last 3 Encounters:   20 (!) 152/62   19 130/60   19 130/60       BMI  Body mass index is 37.38 kg/m². Estimated body mass index is 37.38 kg/m² as calculated from the following:    Height as of this encounter: 5' 5\" (1.651 m). Weight as of this encounter: 224 lb 9.6 oz (101.9 kg). CBC   Lab Results   Component Value Date    WBC 7.3 2019    RBC 4.53 2019    HGB 14.3 2019    HCT 42.1 2019    MCV 92.8 2019    RDW 13.6 2019     2019     CMP    Lab Results   Component Value Date     2019    K 4.2 2019     2019    CO2 24 2019    BUN 26 2019    CREATININE 1.2 2019    GFRAA >60 2019    GFRAA >60 2013    AGRATIO 1.4 2011    LABGLOM 59 2019    GLUCOSE 108 2019    PROT 6.9 2019    PROT 7.1 2011    CALCIUM 8.8 2019    BILITOT 0.7 2019    ALKPHOS 56 2019    AST 12 2019    ALT 13 2019     BMP    Lab Results   Component Value Date     2019    K 4.2 2019     2019    CO2 24 2019    BUN 26 2019    CREATININE 1.2 2019    CALCIUM 8.8 2019    GFRAA >60 2019    GFRAA >60 2013    LABGLOM 59 2019    GLUCOSE 108 2019     POCGlucose  No results for input(s): GLUCOSE in the last 72 hours.    [facility]    Lab Results   Component Value Date    PROTIME 29.2 2012    INR 2.58 2012    APTT 40.9 3248     HCG (If Applicable) No results found for: PREGTESTUR, PREGSERUM, HCG, HCGQUANT   ABGs No results found for: PHART, PO2ART, IWW1YYV, SWV9GIA, BEART, A4KCGYSP   Type & Screen (If Applicable)  No results found for: Tona Marks BMI: Wt Readings from Last 3 Encounters:       NPO Status:   Date of last liquid consumption: 02/11/20   Time of last liquid consumption: 0315   Date of last solid food consumption: 02/09/20      Time of last solid consumption: 2100       Anesthesia Evaluation  Patient summary reviewed  Airway: Mallampati: III  TM distance: >3 FB   Neck ROM: full  Mouth opening: > = 3 FB Dental: normal exam         Pulmonary: breath sounds clear to auscultation  (+) sleep apnea:      (-) COPD, asthma and shortness of breath                           Cardiovascular:    (+) hypertension:, dysrhythmias: SVT and atrial fibrillation, CHF:, hyperlipidemia    (-) valvular problems/murmurs, past MI, CAD, CABG/stent and  angina      Rhythm: regular  Rate: normal                    Neuro/Psych:   (+) neuromuscular disease:,    (-) seizures, TIA and CVA           GI/Hepatic/Renal:        (-) GERD, PUD, liver disease and no renal disease       Endo/Other:    (+) : arthritis:., .    (-) diabetes mellitus               Abdominal:           Vascular: negative vascular ROS. Anesthesia Plan      MAC     ASA 3     (I discussed local anesthesia with intravenous sedation to the   patient's satisfaction and agreed including risks and alternatives. The  patient has no further questions. WENDY BEE )  Induction: intravenous. Anesthetic plan and risks discussed with patient. Plan discussed with CRNA. This pre-anesthesia assessment may be used as a history and physical.    DOS STAFF ADDENDUM:    Pt seen and examined, chart reviewed (including anesthesia, drug and allergy history). No interval changes to history and physical examination. Anesthetic plan, risks, benefits, alternatives, and personnel involved discussed with patient. Patient verbalized an understanding and agrees to proceed.       Shannan Odonnell MD  February 11, 2020  7:53 AM

## 2020-02-13 RX ORDER — CARVEDILOL 25 MG/1
TABLET ORAL
Qty: 180 TABLET | Refills: 1 | Status: SHIPPED | OUTPATIENT
Start: 2020-02-13 | End: 2020-06-22

## 2020-02-13 RX ORDER — SPIRONOLACTONE 25 MG/1
TABLET ORAL
Qty: 90 TABLET | Refills: 1 | Status: SHIPPED | OUTPATIENT
Start: 2020-02-13 | End: 2020-06-22

## 2020-02-13 RX ORDER — ATORVASTATIN CALCIUM 80 MG/1
TABLET, FILM COATED ORAL
Qty: 90 TABLET | Refills: 1 | Status: SHIPPED | OUTPATIENT
Start: 2020-02-13 | End: 2020-06-22

## 2020-02-13 RX ORDER — AMIODARONE HYDROCHLORIDE 200 MG/1
TABLET ORAL
Qty: 90 TABLET | Refills: 1 | Status: SHIPPED | OUTPATIENT
Start: 2020-02-13 | End: 2020-06-22

## 2020-02-13 NOTE — TELEPHONE ENCOUNTER
Need to know how much spironolactone he is taking now-1/2 tablet or 1 tablet  If he is taking 1/2 tablet,not know who changed dose-find out please.

## 2020-02-25 ENCOUNTER — OFFICE VISIT (OUTPATIENT)
Dept: INTERNAL MEDICINE CLINIC | Age: 80
End: 2020-02-25
Payer: MEDICARE

## 2020-02-25 VITALS
OXYGEN SATURATION: 98 % | HEART RATE: 51 BPM | HEIGHT: 66 IN | TEMPERATURE: 98.5 F | RESPIRATION RATE: 18 BRPM | WEIGHT: 223.6 LBS | DIASTOLIC BLOOD PRESSURE: 70 MMHG | BODY MASS INDEX: 35.93 KG/M2 | SYSTOLIC BLOOD PRESSURE: 126 MMHG

## 2020-02-25 PROCEDURE — G8427 DOCREV CUR MEDS BY ELIG CLIN: HCPCS | Performed by: INTERNAL MEDICINE

## 2020-02-25 PROCEDURE — 99213 OFFICE O/P EST LOW 20 MIN: CPT | Performed by: INTERNAL MEDICINE

## 2020-02-25 PROCEDURE — G8417 CALC BMI ABV UP PARAM F/U: HCPCS | Performed by: INTERNAL MEDICINE

## 2020-02-25 PROCEDURE — 1123F ACP DISCUSS/DSCN MKR DOCD: CPT | Performed by: INTERNAL MEDICINE

## 2020-02-25 PROCEDURE — 1036F TOBACCO NON-USER: CPT | Performed by: INTERNAL MEDICINE

## 2020-02-25 PROCEDURE — G8482 FLU IMMUNIZE ORDER/ADMIN: HCPCS | Performed by: INTERNAL MEDICINE

## 2020-02-25 PROCEDURE — 4040F PNEUMOC VAC/ADMIN/RCVD: CPT | Performed by: INTERNAL MEDICINE

## 2020-02-25 RX ORDER — FUROSEMIDE 20 MG/1
TABLET ORAL
COMMUNITY
Start: 2019-04-22 | End: 2021-01-25 | Stop reason: SINTOL

## 2020-02-25 SDOH — ECONOMIC STABILITY: FOOD INSECURITY: WITHIN THE PAST 12 MONTHS, THE FOOD YOU BOUGHT JUST DIDN'T LAST AND YOU DIDN'T HAVE MONEY TO GET MORE.: NEVER TRUE

## 2020-02-25 SDOH — ECONOMIC STABILITY: FOOD INSECURITY: WITHIN THE PAST 12 MONTHS, YOU WORRIED THAT YOUR FOOD WOULD RUN OUT BEFORE YOU GOT MONEY TO BUY MORE.: NEVER TRUE

## 2020-02-25 SDOH — ECONOMIC STABILITY: INCOME INSECURITY: HOW HARD IS IT FOR YOU TO PAY FOR THE VERY BASICS LIKE FOOD, HOUSING, MEDICAL CARE, AND HEATING?: NOT HARD AT ALL

## 2020-02-25 SDOH — ECONOMIC STABILITY: TRANSPORTATION INSECURITY
IN THE PAST 12 MONTHS, HAS LACK OF TRANSPORTATION KEPT YOU FROM MEETINGS, WORK, OR FROM GETTING THINGS NEEDED FOR DAILY LIVING?: NO

## 2020-02-25 SDOH — ECONOMIC STABILITY: TRANSPORTATION INSECURITY
IN THE PAST 12 MONTHS, HAS THE LACK OF TRANSPORTATION KEPT YOU FROM MEDICAL APPOINTMENTS OR FROM GETTING MEDICATIONS?: NO

## 2020-02-25 ASSESSMENT — PATIENT HEALTH QUESTIONNAIRE - PHQ9
SUM OF ALL RESPONSES TO PHQ QUESTIONS 1-9: 0
2. FEELING DOWN, DEPRESSED OR HOPELESS: 0
1. LITTLE INTEREST OR PLEASURE IN DOING THINGS: 0
SUM OF ALL RESPONSES TO PHQ QUESTIONS 1-9: 0
SUM OF ALL RESPONSES TO PHQ9 QUESTIONS 1 & 2: 0

## 2020-02-25 ASSESSMENT — ENCOUNTER SYMPTOMS
SHORTNESS OF BREATH: 0
COUGH: 0
GASTROINTESTINAL NEGATIVE: 1
TROUBLE SWALLOWING: 0
CHEST TIGHTNESS: 0
WHEEZING: 0

## 2020-04-29 ENCOUNTER — TELEPHONE (OUTPATIENT)
Dept: INTERNAL MEDICINE CLINIC | Age: 80
End: 2020-04-29

## 2020-04-29 PROBLEM — L23.7 ALLERGIC CONTACT DERMATITIS DUE TO PLANTS, EXCEPT FOOD: Status: ACTIVE | Noted: 2020-04-29

## 2020-05-13 ENCOUNTER — TELEPHONE (OUTPATIENT)
Dept: INTERNAL MEDICINE CLINIC | Age: 80
End: 2020-05-13

## 2020-05-13 NOTE — TELEPHONE ENCOUNTER
Patient son says that 2 weeks ago he thinks patient had an allergic reaction to something because his face was red and puffy and was given some type of steroids. Son says the patient face is puffy and red again. Doesn't know if he should give the patient benadryl.  Please advise

## 2020-05-13 NOTE — TELEPHONE ENCOUNTER
Need to see him  at [facility] office tomorrow   Set up time and let patient know please.

## 2020-06-01 ENCOUNTER — VIRTUAL VISIT (OUTPATIENT)
Dept: INTERNAL MEDICINE CLINIC | Age: 80
End: 2020-06-01
Payer: MEDICARE

## 2020-06-01 PROCEDURE — 99213 OFFICE O/P EST LOW 20 MIN: CPT | Performed by: INTERNAL MEDICINE

## 2020-06-01 ASSESSMENT — ENCOUNTER SYMPTOMS
COUGH: 0
GASTROINTESTINAL NEGATIVE: 1
WHEEZING: 0
SHORTNESS OF BREATH: 0
CHEST TIGHTNESS: 0

## 2020-06-22 RX ORDER — ATORVASTATIN CALCIUM 80 MG/1
TABLET, FILM COATED ORAL
Qty: 90 TABLET | Refills: 1 | Status: SHIPPED | OUTPATIENT
Start: 2020-06-22 | End: 2020-11-16

## 2020-06-22 RX ORDER — CARVEDILOL 25 MG/1
TABLET ORAL
Qty: 180 TABLET | Refills: 1 | Status: SHIPPED | OUTPATIENT
Start: 2020-06-22 | End: 2020-11-16

## 2020-06-22 RX ORDER — AMIODARONE HYDROCHLORIDE 200 MG/1
TABLET ORAL
Qty: 90 TABLET | Refills: 1 | Status: SHIPPED | OUTPATIENT
Start: 2020-06-22 | End: 2020-11-16

## 2020-06-22 RX ORDER — SPIRONOLACTONE 25 MG/1
TABLET ORAL
Qty: 90 TABLET | Refills: 1 | Status: SHIPPED | OUTPATIENT
Start: 2020-06-22 | End: 2020-11-16

## 2020-07-15 RX ORDER — DICLOFENAC SODIUM 75 MG/1
TABLET, DELAYED RELEASE ORAL
Qty: 60 TABLET | Refills: 0 | Status: SHIPPED | OUTPATIENT
Start: 2020-07-15 | End: 2020-08-20

## 2020-07-15 NOTE — TELEPHONE ENCOUNTER
Last office visit: 06/01/2020    Future Appointments   Date Time Provider Jose Figueredo   9/2/2020 10:00 AM Shayy Darling

## 2020-08-10 ENCOUNTER — OFFICE VISIT (OUTPATIENT)
Dept: INTERNAL MEDICINE CLINIC | Age: 80
End: 2020-08-10
Payer: MEDICARE

## 2020-08-10 VITALS
TEMPERATURE: 96.5 F | HEART RATE: 52 BPM | BODY MASS INDEX: 36.64 KG/M2 | DIASTOLIC BLOOD PRESSURE: 80 MMHG | WEIGHT: 227 LBS | SYSTOLIC BLOOD PRESSURE: 120 MMHG | OXYGEN SATURATION: 98 % | RESPIRATION RATE: 20 BRPM

## 2020-08-10 PROBLEM — H81.90 VESTIBULAR DIZZINESS: Status: ACTIVE | Noted: 2020-08-10

## 2020-08-10 PROCEDURE — G8417 CALC BMI ABV UP PARAM F/U: HCPCS | Performed by: INTERNAL MEDICINE

## 2020-08-10 PROCEDURE — 4040F PNEUMOC VAC/ADMIN/RCVD: CPT | Performed by: INTERNAL MEDICINE

## 2020-08-10 PROCEDURE — G8427 DOCREV CUR MEDS BY ELIG CLIN: HCPCS | Performed by: INTERNAL MEDICINE

## 2020-08-10 PROCEDURE — 1036F TOBACCO NON-USER: CPT | Performed by: INTERNAL MEDICINE

## 2020-08-10 PROCEDURE — 1123F ACP DISCUSS/DSCN MKR DOCD: CPT | Performed by: INTERNAL MEDICINE

## 2020-08-10 PROCEDURE — 99214 OFFICE O/P EST MOD 30 MIN: CPT | Performed by: INTERNAL MEDICINE

## 2020-08-10 RX ORDER — MECLIZINE HYDROCHLORIDE 25 MG/1
25 TABLET ORAL 3 TIMES DAILY PRN
Qty: 30 TABLET | Refills: 0 | Status: SHIPPED | OUTPATIENT
Start: 2020-08-10 | End: 2021-10-28 | Stop reason: SDUPTHER

## 2020-08-10 ASSESSMENT — ENCOUNTER SYMPTOMS
GASTROINTESTINAL NEGATIVE: 1
TROUBLE SWALLOWING: 0
SHORTNESS OF BREATH: 0
COUGH: 0
WHEEZING: 0
CHEST TIGHTNESS: 0

## 2020-08-10 NOTE — PATIENT INSTRUCTIONS
stroke. These may include:  ? Sudden numbness, tingling, weakness, or loss of movement in your face, arm, or leg, especially on only one side of your body. ? Sudden vision changes. ? Sudden trouble speaking. ? Sudden confusion or trouble understanding simple statements. ? Sudden problems with walking or balance. ? A sudden, severe headache that is different from past headaches. Call your doctor now or seek immediate medical care if:  · Vertigo occurs with a fever, a headache, or ringing in your ears. · You have new or increased nausea and vomiting. Watch closely for changes in your health, and be sure to contact your doctor if:  · Vertigo gets worse or happens more often. · Vertigo has not gotten better after 2 weeks. Where can you learn more? Go to [email or web address].[email or web address] [email or web address] org and sign in to your ZAPR account. Enter I770 in the [employer] box to learn more about \"Vertigo: Care Instructions. \"     If you do not have an account, please click on the \"Sign Up Now\" link. Current as of: July 29, 2019               Content Version: 12.5  © 1020-6779 [employer]Turion. Care instructions adapted under license by [facility] ([facility]). If you have questions about a medical condition or this instruction, always ask your healthcare professional. [address] any warranty or liability for your use of this information. Patient Education        [place] Exercises for Vertigo: Care Instructions  Your Care Instructions  Simple exercises can help you regain your balance when you have vertigo. If you have Ménière's disease, benign paroxysmal positional vertigo (BPPV), or another inner ear problem, you may have vertigo off and on. Do these exercises first thing in the morning and before you go to bed. You might get dizzy when you first start them. If this happens, try to do them for at least 5 minutes.  Do a group of exercises at a time, starting at the top of the list. It may take several weeks before you can do all the exercises without feeling dizzy. Follow-up care is a key part of your treatment and safety. Be sure to make and go to all appointments, and call your doctor if you are having problems. It's also a good idea to know your test results and keep a list of the medicines you take. How can you care for yourself at home? Exercise 1  While sitting on the side of the bed and holding your head still:  · Look up as far as you can. · Look down as far as you can. · Look from side to side as far as you can. · Stretch your arm straight out in front of you. Focus on your index finger. Continue to focus on your finger while you bring it to your nose. Exercise 2  While sitting on the side of the bed:  · Bring your head as far back as you can. · Bring your head forward to touch your chin to your chest.  · Turn your head from side to side. · Do these exercises first with your eyes open. Then try with your eyes closed. Exercise 3  While sitting on the side of the bed:  · Shrug your shoulders straight upward, then relax them. · Bend over and try to touch the ground with your fingers. Then go back to a sitting position. · Toss a small ball from one hand to the other. Throw the ball higher than your eyes so you have to look up. Exercise 4  While standing (with someone close by if you feel uncomfortable):  · Repeat Exercise 1.  · Repeat Exercise 2.  · Pass a ball between your legs and above your head. · Sit down and then stand up. Repeat. Turn around in a [place] a different way each time you stand. · With someone close by to help you, try the above exercises with your eyes closed. Exercise 5  In a room that is cleared of obstacles:  · Walk to a corner of the room, turn to your right, and walk back to the starting point. Now, repeat and turn left. · Walk up and down a slope. Now try stairs.   · While holding on to someone's arm, try these exercises with your eyes [email or web address] and sign in to your ParentsWarehart account. Enter F349 in the [place] box to learn more about \"Vertigo: Exercises. \"     If you do not have an account, please click on the \"Sign Up Now\" link. Current as of: July 29, 2019               Content Version: 12.5  © 4395-2570 Healthwise, Incorporated. Care instructions adapted under license by [facility] [facility] If you have questions about a medical condition or this instruction, always ask your healthcare professional. [address] any warranty or liability for your use of this information.

## 2020-08-20 ENCOUNTER — NURSE ONLY (OUTPATIENT)
Dept: INTERNAL MEDICINE CLINIC | Age: 80
End: 2020-08-20
Payer: MEDICARE

## 2020-08-20 LAB
ALBUMIN SERPL-MCNC: 4.2 G/DL (ref 3.4–5)
ALP BLD-CCNC: 46 U/L (ref 40–129)
ALT SERPL-CCNC: 14 U/L (ref 10–40)
ANION GAP SERPL CALCULATED.3IONS-SCNC: 14 MMOL/L (ref 3–16)
AST SERPL-CCNC: 13 U/L (ref 15–37)
BASOPHILS ABSOLUTE: 0 K/UL (ref 0–0.2)
BASOPHILS RELATIVE PERCENT: 0.5 %
BILIRUB SERPL-MCNC: 0.8 MG/DL (ref 0–1)
BILIRUBIN DIRECT: <0.2 MG/DL (ref 0–0.3)
BILIRUBIN, INDIRECT: ABNORMAL MG/DL (ref 0–1)
BUN BLDV-MCNC: 21 MG/DL (ref 7–20)
CALCIUM SERPL-MCNC: 8.7 MG/DL (ref 8.3–10.6)
CHLORIDE BLD-SCNC: 103 MMOL/L (ref 99–110)
CHOLESTEROL, TOTAL: 130 MG/DL (ref 0–199)
CO2: 24 MMOL/L (ref 21–32)
CREAT SERPL-MCNC: 1.1 MG/DL (ref 0.8–1.3)
EOSINOPHILS ABSOLUTE: 0.2 K/UL (ref 0–0.6)
EOSINOPHILS RELATIVE PERCENT: 3.1 %
GFR AFRICAN AMERICAN: >60
GFR NON-AFRICAN AMERICAN: >60
GLUCOSE BLD-MCNC: 119 MG/DL (ref 70–99)
HCT VFR BLD CALC: 43.5 % (ref 40.5–52.5)
HDLC SERPL-MCNC: 33 MG/DL (ref 40–60)
HEMOGLOBIN: 14.5 G/DL (ref 13.5–17.5)
LDL CHOLESTEROL CALCULATED: 61 MG/DL
LYMPHOCYTES ABSOLUTE: 1.8 K/UL (ref 1–5.1)
LYMPHOCYTES RELATIVE PERCENT: 28.4 %
MCH RBC QN AUTO: 31.5 PG (ref 26–34)
MCHC RBC AUTO-ENTMCNC: 33.4 G/DL (ref 31–36)
MCV RBC AUTO: 94.4 FL (ref 80–100)
MONOCYTES ABSOLUTE: 0.8 K/UL (ref 0–1.3)
MONOCYTES RELATIVE PERCENT: 13.2 %
NEUTROPHILS ABSOLUTE: 3.4 K/UL (ref 1.7–7.7)
NEUTROPHILS RELATIVE PERCENT: 54.8 %
PDW BLD-RTO: 13.2 % (ref 12.4–15.4)
PLATELET # BLD: 142 K/UL (ref 135–450)
PMV BLD AUTO: 10.1 FL (ref 5–10.5)
POTASSIUM SERPL-SCNC: 4.3 MMOL/L (ref 3.5–5.1)
PROSTATE SPECIFIC ANTIGEN: 1.22 NG/ML (ref 0–4)
RBC # BLD: 4.61 M/UL (ref 4.2–5.9)
SODIUM BLD-SCNC: 141 MMOL/L (ref 136–145)
TOTAL PROTEIN: 6.3 G/DL (ref 6.4–8.2)
TRIGL SERPL-MCNC: 182 MG/DL (ref 0–150)
VLDLC SERPL CALC-MCNC: 36 MG/DL
WBC # BLD: 6.2 K/UL (ref 4–11)

## 2020-08-20 PROCEDURE — 36415 COLL VENOUS BLD VENIPUNCTURE: CPT | Performed by: INTERNAL MEDICINE

## 2020-08-20 RX ORDER — DICLOFENAC SODIUM 75 MG/1
TABLET, DELAYED RELEASE ORAL
Qty: 60 TABLET | Refills: 0 | Status: SHIPPED | OUTPATIENT
Start: 2020-08-20 | End: 2020-09-28

## 2020-09-28 RX ORDER — DICLOFENAC SODIUM 75 MG/1
TABLET, DELAYED RELEASE ORAL
Qty: 60 TABLET | Refills: 0 | Status: SHIPPED | OUTPATIENT
Start: 2020-09-28 | End: 2020-11-30 | Stop reason: SDUPTHER

## 2020-09-28 NOTE — TELEPHONE ENCOUNTER
Last office visit: 08/10/2020    Future Appointments   Date Time Provider Jose Figueredo   9/30/2020 11:15 AM Abdulaziz Dunn MD [facility]

## 2020-09-30 ENCOUNTER — OFFICE VISIT (OUTPATIENT)
Dept: INTERNAL MEDICINE CLINIC | Age: 80
End: 2020-09-30
Payer: MEDICARE

## 2020-09-30 VITALS
SYSTOLIC BLOOD PRESSURE: 120 MMHG | TEMPERATURE: 97.3 F | OXYGEN SATURATION: 98 % | BODY MASS INDEX: 37.25 KG/M2 | HEART RATE: 55 BPM | WEIGHT: 230.8 LBS | RESPIRATION RATE: 16 BRPM | DIASTOLIC BLOOD PRESSURE: 70 MMHG

## 2020-09-30 PROBLEM — M72.2 PLANTAR FASCIITIS, RIGHT: Status: ACTIVE | Noted: 2020-09-30

## 2020-09-30 PROCEDURE — 99213 OFFICE O/P EST LOW 20 MIN: CPT | Performed by: INTERNAL MEDICINE

## 2020-09-30 PROCEDURE — G0008 ADMIN INFLUENZA VIRUS VAC: HCPCS | Performed by: INTERNAL MEDICINE

## 2020-09-30 PROCEDURE — 1123F ACP DISCUSS/DSCN MKR DOCD: CPT | Performed by: INTERNAL MEDICINE

## 2020-09-30 PROCEDURE — 4040F PNEUMOC VAC/ADMIN/RCVD: CPT | Performed by: INTERNAL MEDICINE

## 2020-09-30 PROCEDURE — G8427 DOCREV CUR MEDS BY ELIG CLIN: HCPCS | Performed by: INTERNAL MEDICINE

## 2020-09-30 PROCEDURE — G8417 CALC BMI ABV UP PARAM F/U: HCPCS | Performed by: INTERNAL MEDICINE

## 2020-09-30 PROCEDURE — 1036F TOBACCO NON-USER: CPT | Performed by: INTERNAL MEDICINE

## 2020-09-30 PROCEDURE — 90694 VACC AIIV4 NO PRSRV 0.5ML IM: CPT | Performed by: INTERNAL MEDICINE

## 2020-09-30 ASSESSMENT — ENCOUNTER SYMPTOMS
GASTROINTESTINAL NEGATIVE: 1
TROUBLE SWALLOWING: 0
COUGH: 0
CHEST TIGHTNESS: 0
WHEEZING: 0
SHORTNESS OF BREATH: 0

## 2020-09-30 NOTE — PROGRESS NOTES
Subjective:      Patient ID: Amelia Antunez is a 70 y.o. male. HPI  Pain right heel x 2 weeks and not there all the time and had no injury  Hurts more with walking ,more yesterday am and more after walking for 3 miles and does daily. Has no other cp gi or gu symptoms  Ha s no other symptoms with activity  Review of Systems   Constitutional: Negative for activity change, appetite change and unexpected weight change. HENT: Negative for trouble swallowing. Respiratory: Negative for cough, chest tightness, shortness of breath and wheezing. Cardiovascular: Negative for chest pain, palpitations and leg swelling. Gastrointestinal: Negative. Genitourinary: Negative. Neurological: Negative for dizziness, light-headedness and headaches. Objective:   Physical Exam  Vitals signs and nursing note reviewed. Constitutional:       General: He is not in acute distress. Eyes:      General: No scleral icterus. Extraocular Movements: Extraocular movements intact. Conjunctiva/sclera: Conjunctivae normal.      Pupils: Pupils are equal, round, and reactive to light. Neck:      Thyroid: No thyromegaly. Vascular: No carotid bruit or JVD. Cardiovascular:      Rate and Rhythm: Normal rate and regular rhythm. Pulses: Normal pulses. Heart sounds: Normal heart sounds. No murmur. No gallop. Pulmonary:      Effort: No respiratory distress. Breath sounds: Normal breath sounds. No wheezing, rhonchi or rales. Musculoskeletal:      Right lower leg: No edema. Left lower leg: No edema. Comments: Has bunion right foot  Has no achilles tenderness and has slight heel tenderness. Lymphadenopathy:      Cervical: No cervical adenopathy. Neurological:      Mental Status: He is alert and oriented to person, place, and time.          Assessment:      htn controlled  Right plantar fasciitis      Plan:      Continue current medications  Take diclofenac 2 times daily with food x 2 weeks-has at home.   Advised heels exercises to stretch has tendon in his foot  Call if not getting better        Compa Stephenson MD

## 2020-11-16 RX ORDER — AMIODARONE HYDROCHLORIDE 200 MG/1
TABLET ORAL
Qty: 90 TABLET | Refills: 1 | Status: SHIPPED | OUTPATIENT
Start: 2020-11-16 | End: 2021-03-31

## 2020-11-16 RX ORDER — CARVEDILOL 25 MG/1
TABLET ORAL
Qty: 180 TABLET | Refills: 1 | Status: SHIPPED | OUTPATIENT
Start: 2020-11-16 | End: 2021-03-31

## 2020-11-16 RX ORDER — ATORVASTATIN CALCIUM 80 MG/1
TABLET, FILM COATED ORAL
Qty: 90 TABLET | Refills: 1 | Status: SHIPPED | OUTPATIENT
Start: 2020-11-16 | End: 2021-03-31

## 2020-11-16 RX ORDER — SPIRONOLACTONE 25 MG/1
TABLET ORAL
Qty: 90 TABLET | Refills: 1 | Status: SHIPPED | OUTPATIENT
Start: 2020-11-16 | End: 2021-03-31

## 2020-11-30 RX ORDER — DICLOFENAC SODIUM 75 MG/1
75 TABLET, DELAYED RELEASE ORAL 2 TIMES DAILY
Qty: 60 TABLET | Refills: 2 | Status: SHIPPED | OUTPATIENT
Start: 2020-11-30 | End: 2021-01-25

## 2021-01-25 ENCOUNTER — OFFICE VISIT (OUTPATIENT)
Dept: INTERNAL MEDICINE CLINIC | Age: 81
End: 2021-01-25
Payer: MEDICARE

## 2021-01-25 VITALS
BODY MASS INDEX: 37.35 KG/M2 | OXYGEN SATURATION: 97 % | DIASTOLIC BLOOD PRESSURE: 60 MMHG | HEART RATE: 56 BPM | SYSTOLIC BLOOD PRESSURE: 110 MMHG | RESPIRATION RATE: 16 BRPM | WEIGHT: 231.4 LBS | TEMPERATURE: 97.3 F

## 2021-01-25 DIAGNOSIS — I10 ESSENTIAL HYPERTENSION: Primary | ICD-10-CM

## 2021-01-25 DIAGNOSIS — I48.0 PAROXYSMAL ATRIAL FIBRILLATION (HCC): ICD-10-CM

## 2021-01-25 DIAGNOSIS — I50.32 CHRONIC DIASTOLIC CHF (CONGESTIVE HEART FAILURE) (HCC): ICD-10-CM

## 2021-01-25 LAB
ANION GAP SERPL CALCULATED.3IONS-SCNC: 10 MMOL/L (ref 3–16)
BUN BLDV-MCNC: 33 MG/DL (ref 7–20)
CALCIUM SERPL-MCNC: 8.8 MG/DL (ref 8.3–10.6)
CHLORIDE BLD-SCNC: 102 MMOL/L (ref 99–110)
CO2: 25 MMOL/L (ref 21–32)
CREAT SERPL-MCNC: 1.4 MG/DL (ref 0.8–1.3)
GFR AFRICAN AMERICAN: 59
GFR NON-AFRICAN AMERICAN: 49
GLUCOSE BLD-MCNC: 120 MG/DL (ref 70–99)
POTASSIUM SERPL-SCNC: 4.6 MMOL/L (ref 3.5–5.1)
SODIUM BLD-SCNC: 137 MMOL/L (ref 136–145)
TSH REFLEX: 3.07 UIU/ML (ref 0.27–4.2)

## 2021-01-25 PROCEDURE — G8417 CALC BMI ABV UP PARAM F/U: HCPCS | Performed by: INTERNAL MEDICINE

## 2021-01-25 PROCEDURE — 99213 OFFICE O/P EST LOW 20 MIN: CPT | Performed by: INTERNAL MEDICINE

## 2021-01-25 PROCEDURE — 1036F TOBACCO NON-USER: CPT | Performed by: INTERNAL MEDICINE

## 2021-01-25 PROCEDURE — 36415 COLL VENOUS BLD VENIPUNCTURE: CPT | Performed by: INTERNAL MEDICINE

## 2021-01-25 PROCEDURE — 1123F ACP DISCUSS/DSCN MKR DOCD: CPT | Performed by: INTERNAL MEDICINE

## 2021-01-25 PROCEDURE — G8427 DOCREV CUR MEDS BY ELIG CLIN: HCPCS | Performed by: INTERNAL MEDICINE

## 2021-01-25 PROCEDURE — 4040F PNEUMOC VAC/ADMIN/RCVD: CPT | Performed by: INTERNAL MEDICINE

## 2021-01-25 PROCEDURE — G8484 FLU IMMUNIZE NO ADMIN: HCPCS | Performed by: INTERNAL MEDICINE

## 2021-01-25 RX ORDER — DICLOFENAC SODIUM 75 MG/1
75 TABLET, DELAYED RELEASE ORAL
Qty: 1 TABLET | Refills: 0
Start: 2021-01-25 | End: 2021-04-27 | Stop reason: SINTOL

## 2021-01-25 ASSESSMENT — ENCOUNTER SYMPTOMS
GASTROINTESTINAL NEGATIVE: 1
TROUBLE SWALLOWING: 0
COUGH: 0
WHEEZING: 0
CHEST TIGHTNESS: 0
SHORTNESS OF BREATH: 0
SORE THROAT: 0

## 2021-01-25 ASSESSMENT — PATIENT HEALTH QUESTIONNAIRE - PHQ9
1. LITTLE INTEREST OR PLEASURE IN DOING THINGS: 0
SUM OF ALL RESPONSES TO PHQ9 QUESTIONS 1 & 2: 0

## 2021-01-25 NOTE — PROGRESS NOTES
Subjective:      Patient ID: Conrad Gonzalez is a 68 y.o. male. Chief Complaint   Patient presents with    Hypertension        HPI  Feels well and has no cp gi or gu symptoms  Has no symptoms with activity  Review of Systems   Constitutional: Negative for activity change, appetite change, fatigue and unexpected weight change. HENT: Negative for sore throat and trouble swallowing. Respiratory: Negative for cough, chest tightness, shortness of breath and wheezing. Cardiovascular: Negative for chest pain, palpitations and leg swelling. Gastrointestinal: Negative. Abdominal pain: has no heart burns. Genitourinary: Negative. Neurological: Negative for dizziness, light-headedness and headaches. Current Outpatient Medications   Medication Sig Dispense Refill    diclofenac (VOLTAREN) 75 MG EC tablet Take 1 tablet by mouth 2 times daily 60 tablet 2    amiodarone (CORDARONE) 200 MG tablet TAKE 1 TABLET EVERY DAY 90 tablet 1    carvedilol (COREG) 25 MG tablet TAKE 1 TABLET TWICE DAILY WITH MEALS 180 tablet 1    spironolactone (ALDACTONE) 25 MG tablet TAKE 1 TABLET EVERY DAY 90 tablet 1    atorvastatin (LIPITOR) 80 MG tablet TAKE 1 TABLET EVERY DAY 90 tablet 1    furosemide (LASIX) 20 MG tablet TAKE 1 TABLET EVERY DAY      aspirin EC 81 MG EC tablet Take 1 tablet by mouth daily. 30 tablet 3    lansoprazole (PREVACID) 30 MG capsule Take 30 mg by mouth daily. No current facility-administered medications for this visit.              Lab Results   Component Value Date     08/20/2020    K 4.3 08/20/2020     08/20/2020    CO2 24 08/20/2020    BUN 21 08/20/2020    CREATININE 1.1 08/20/2020    GLUCOSE 119 08/20/2020    CALCIUM 8.7 08/20/2020        Lab Results   Component Value Date    CHOL 130 08/20/2020    CHOL 141 05/02/2019    CHOL 145 12/08/2017     Lab Results   Component Value Date    TRIG 182 (H) 08/20/2020    TRIG 164 (H) 05/02/2019    TRIG 150 12/08/2017     Lab Results Component Value Date    HDL 33 (L) 08/20/2020    HDL 35 (L) 05/02/2019    HDL 43 12/08/2017     Lab Results   Component Value Date    LDLCALC 61 08/20/2020    LDLCALC 73 05/02/2019    LDLCALC 72 12/08/2017     Lab Results   Component Value Date    LABVLDL 36 08/20/2020    LABVLDL 33 05/02/2019    LABVLDL 30 12/08/2017         Objective:   Physical Exam  Vitals signs and nursing note reviewed. Constitutional:       General: He is not in acute distress. Eyes:      Extraocular Movements: Extraocular movements intact. Conjunctiva/sclera: Conjunctivae normal.      Pupils: Pupils are equal, round, and reactive to light. Neck:      Thyroid: No thyromegaly. Vascular: No carotid bruit or JVD. Cardiovascular:      Rate and Rhythm: Normal rate and regular rhythm. Pulses: Normal pulses. Heart sounds: Normal heart sounds. No murmur. No gallop. Pulmonary:      Effort: No respiratory distress. Breath sounds: Normal breath sounds. No wheezing, rhonchi or rales. Musculoskeletal:      Right lower leg: No edema. Left lower leg: No edema. Lymphadenopathy:      Cervical: No cervical adenopathy. Neurological:      Mental Status: He is alert and oriented to person, place, and time.          Assessment:    PAF and is in regular rhythm  htn controlled  Diastolic chf well controlled      Plan:      Continue current medications  [place] in  3 months        Stefanie Johnson MD

## 2021-01-27 ENCOUNTER — TELEPHONE (OUTPATIENT)
Dept: INTERNAL MEDICINE CLINIC | Age: 81
End: 2021-01-27

## 2021-01-27 NOTE — TELEPHONE ENCOUNTER
----- Message from Catalino Ovalles sent at 1/26/2021 12:50 PM EST -----  Subject: Appointment Request    Reason for Call: Routine Existing Condition Follow Up    QUESTIONS  Type of Appointment? Established Patient  Reason for appointment request? No appointments available during search  Additional Information for Provider? 1 month f/u and blood test (2/26 or   later)  ---------------------------------------------------------------------------  --------------  CALL BACK INFO  What is the best way for the office to contact you? OK to leave message on   voicemail   OK to respond with electronic message via [employer] portal (only for   patients who have registered [employer] account)  Preferred Call Back Phone Number? [phone]  ---------------------------------------------------------------------------  --------------  SCRIPT ANSWERS  Relationship to Patient? Other  Representative Name? Erica Fritz (wife)  Additional information verified (besides Name and Date of Birth)? Address  Appointment reason? Well Care/Follow Ups  Select a Well Care/Follow Ups appointment reason? Adult Existing Condition   Follow Up [Diabetes   CHF   COPD   Hypertension/Blood Pressure Check]  (Is the patient requesting to be seen urgently for their symptoms?)? No  Is this follow up request related to routine Diabetes Management? No  Are you having any new concerns about your existing condition? No  Have you been diagnosed with   tested for   or told that you are suspected of having COVID-19 (Coronavirus)? No  Have you had a fever or taken medication to treat a fever within the past   3 days? No  Have you had a cough   shortness of breath or flu-like symptoms within the past 3 days? No  Do you currently have flu-like symptoms including fever or chills   cough   shortness of breath   or difficulty breathing   or new loss of taste or smell? No  (Service Expert  click yes below to proceed with [employer] As Usual   Scheduling)?  Yes

## 2021-02-22 ENCOUNTER — NURSE ONLY (OUTPATIENT)
Dept: INTERNAL MEDICINE CLINIC | Age: 81
End: 2021-02-22
Payer: MEDICARE

## 2021-02-22 DIAGNOSIS — I10 ESSENTIAL HYPERTENSION: Primary | ICD-10-CM

## 2021-02-22 LAB
ANION GAP SERPL CALCULATED.3IONS-SCNC: 12 MMOL/L (ref 3–16)
BUN BLDV-MCNC: 24 MG/DL (ref 7–20)
CALCIUM SERPL-MCNC: 9.2 MG/DL (ref 8.3–10.6)
CHLORIDE BLD-SCNC: 104 MMOL/L (ref 99–110)
CO2: 24 MMOL/L (ref 21–32)
CREAT SERPL-MCNC: 1.2 MG/DL (ref 0.8–1.3)
GFR AFRICAN AMERICAN: >60
GFR NON-AFRICAN AMERICAN: 59
GLUCOSE BLD-MCNC: 141 MG/DL (ref 70–99)
POTASSIUM SERPL-SCNC: 4.1 MMOL/L (ref 3.5–5.1)
SODIUM BLD-SCNC: 140 MMOL/L (ref 136–145)

## 2021-02-22 PROCEDURE — 36415 COLL VENOUS BLD VENIPUNCTURE: CPT | Performed by: INTERNAL MEDICINE

## 2021-03-31 DIAGNOSIS — E78.2 MIXED HYPERLIPIDEMIA: ICD-10-CM

## 2021-03-31 DIAGNOSIS — I10 ESSENTIAL HYPERTENSION: ICD-10-CM

## 2021-03-31 RX ORDER — CARVEDILOL 25 MG/1
TABLET ORAL
Qty: 180 TABLET | Refills: 1 | Status: SHIPPED | OUTPATIENT
Start: 2021-03-31 | End: 2021-08-20

## 2021-03-31 RX ORDER — ATORVASTATIN CALCIUM 80 MG/1
TABLET, FILM COATED ORAL
Qty: 90 TABLET | Refills: 1 | Status: SHIPPED | OUTPATIENT
Start: 2021-03-31 | End: 2021-08-20

## 2021-03-31 RX ORDER — AMIODARONE HYDROCHLORIDE 200 MG/1
TABLET ORAL
Qty: 90 TABLET | Refills: 1 | Status: SHIPPED | OUTPATIENT
Start: 2021-03-31 | End: 2021-08-20

## 2021-03-31 RX ORDER — SPIRONOLACTONE 25 MG/1
TABLET ORAL
Qty: 90 TABLET | Refills: 1 | Status: SHIPPED | OUTPATIENT
Start: 2021-03-31 | End: 2021-08-20

## 2021-04-26 ENCOUNTER — OFFICE VISIT (OUTPATIENT)
Dept: INTERNAL MEDICINE CLINIC | Age: 81
End: 2021-04-26
Payer: MEDICARE

## 2021-04-26 VITALS
BODY MASS INDEX: 36.96 KG/M2 | TEMPERATURE: 97.1 F | HEIGHT: 66 IN | DIASTOLIC BLOOD PRESSURE: 70 MMHG | RESPIRATION RATE: 16 BRPM | HEART RATE: 54 BPM | OXYGEN SATURATION: 95 % | SYSTOLIC BLOOD PRESSURE: 120 MMHG | WEIGHT: 230 LBS

## 2021-04-26 DIAGNOSIS — E66.01 MORBID OBESITY WITH BMI OF 40.0-44.9, ADULT (HCC): ICD-10-CM

## 2021-04-26 DIAGNOSIS — I50.32 CHRONIC DIASTOLIC CHF (CONGESTIVE HEART FAILURE) (HCC): ICD-10-CM

## 2021-04-26 DIAGNOSIS — I10 ESSENTIAL HYPERTENSION: Primary | ICD-10-CM

## 2021-04-26 LAB
ANION GAP SERPL CALCULATED.3IONS-SCNC: 14 MMOL/L (ref 3–16)
BUN BLDV-MCNC: 36 MG/DL (ref 7–20)
CALCIUM SERPL-MCNC: 8.7 MG/DL (ref 8.3–10.6)
CHLORIDE BLD-SCNC: 101 MMOL/L (ref 99–110)
CO2: 24 MMOL/L (ref 21–32)
CREAT SERPL-MCNC: 1.4 MG/DL (ref 0.8–1.3)
GFR AFRICAN AMERICAN: 59
GFR NON-AFRICAN AMERICAN: 49
GLUCOSE BLD-MCNC: 115 MG/DL (ref 70–99)
POTASSIUM SERPL-SCNC: 4.5 MMOL/L (ref 3.5–5.1)
SODIUM BLD-SCNC: 139 MMOL/L (ref 136–145)

## 2021-04-26 PROCEDURE — 1123F ACP DISCUSS/DSCN MKR DOCD: CPT | Performed by: INTERNAL MEDICINE

## 2021-04-26 PROCEDURE — 4040F PNEUMOC VAC/ADMIN/RCVD: CPT | Performed by: INTERNAL MEDICINE

## 2021-04-26 PROCEDURE — G8427 DOCREV CUR MEDS BY ELIG CLIN: HCPCS | Performed by: INTERNAL MEDICINE

## 2021-04-26 PROCEDURE — 99213 OFFICE O/P EST LOW 20 MIN: CPT | Performed by: INTERNAL MEDICINE

## 2021-04-26 PROCEDURE — 1036F TOBACCO NON-USER: CPT | Performed by: INTERNAL MEDICINE

## 2021-04-26 PROCEDURE — 36415 COLL VENOUS BLD VENIPUNCTURE: CPT | Performed by: INTERNAL MEDICINE

## 2021-04-26 PROCEDURE — G8417 CALC BMI ABV UP PARAM F/U: HCPCS | Performed by: INTERNAL MEDICINE

## 2021-04-26 RX ORDER — FUROSEMIDE 20 MG/1
TABLET ORAL
COMMUNITY
Start: 2021-04-06 | End: 2021-04-26 | Stop reason: SINTOL

## 2021-04-26 ASSESSMENT — ENCOUNTER SYMPTOMS
SHORTNESS OF BREATH: 0
GASTROINTESTINAL NEGATIVE: 1
COUGH: 0
CHEST TIGHTNESS: 0
WHEEZING: 0

## 2021-04-26 ASSESSMENT — PATIENT HEALTH QUESTIONNAIRE - PHQ9
SUM OF ALL RESPONSES TO PHQ QUESTIONS 1-9: 0
2. FEELING DOWN, DEPRESSED OR HOPELESS: 0
SUM OF ALL RESPONSES TO PHQ9 QUESTIONS 1 & 2: 0

## 2021-04-26 NOTE — PROGRESS NOTES
Subjective:      Patient ID: Thi New is a 68 y.o. male. Chief Complaint   Patient presents with    Hypertension        HPI  He feels fine and has no new problems   Has no dyspnea with exertion and has no orthopnea or pnd  Has no cough wheezing and has no chest pains or swelling of legs   Has no other cp gi or gu symptoms  Walks  2 miles daily with out symptoms  Review of Systems   Constitutional: Negative for activity change, appetite change, chills, fever and unexpected weight change. Respiratory: Negative for cough, chest tightness, shortness of breath and wheezing. Cardiovascular: Negative for chest pain, palpitations and leg swelling. Gastrointestinal: Negative. Genitourinary: Negative. Neurological: Negative for dizziness, light-headedness and headaches. Psychiatric/Behavioral: Negative for sleep disturbance (with mealtonin). Current Outpatient Medications   Medication Sig Dispense Refill    carvedilol (COREG) 25 MG tablet TAKE 1 TABLET TWICE DAILY WITH MEALS 180 tablet 1    amiodarone (CORDARONE) 200 MG tablet TAKE 1 TABLET EVERY DAY 90 tablet 1    atorvastatin (LIPITOR) 80 MG tablet TAKE 1 TABLET EVERY DAY 90 tablet 1    diclofenac (VOLTAREN) 75 MG EC tablet Take 1 tablet by mouth daily (before lunch) 1 tablet 0    aspirin EC 81 MG EC tablet Take 1 tablet by mouth daily. 30 tablet 3    lansoprazole (PREVACID) 30 MG capsule Take 30 mg by mouth daily.  spironolactone (ALDACTONE) 25 MG tablet TAKE 1 TABLET EVERY DAY 90 tablet 1     No current facility-administered medications for this visit. No results for input(s): WBC, HGB, HCT, MCV, PLT in the last 720 hours.      Lab Results   Component Value Date     02/22/2021    K 4.1 02/22/2021     02/22/2021    CO2 24 02/22/2021    BUN 24 02/22/2021    CREATININE 1.2 02/22/2021    GLUCOSE 141 02/22/2021    CALCIUM 9.2 02/22/2021        Lab Results   Component Value Date    CHOL 130 08/20/2020    CHOL 141 05/02/2019    CHOL 145 12/08/2017     Lab Results   Component Value Date    TRIG 182 (H) 08/20/2020    TRIG 164 (H) 05/02/2019    TRIG 150 12/08/2017     Lab Results   Component Value Date    HDL 33 (L) 08/20/2020    HDL 35 (L) 05/02/2019    HDL 43 12/08/2017     Lab Results   Component Value Date    LDLCALC 61 08/20/2020    LDLCALC 73 05/02/2019    LDLCALC 72 12/08/2017     Lab Results   Component Value Date    LABVLDL 36 08/20/2020    LABVLDL 33 05/02/2019    LABVLDL 30 12/08/2017     No results found for: CHOLHDLRATIO     Objective:   Physical Exam  Vitals signs and nursing note reviewed. Constitutional:       General: He is not in acute distress. Eyes:      General: No scleral icterus. Extraocular Movements: Extraocular movements intact. Conjunctiva/sclera: Conjunctivae normal.      Pupils: Pupils are equal, round, and reactive to light. Neck:      Thyroid: No thyromegaly. Vascular: No carotid bruit or JVD. Cardiovascular:      Rate and Rhythm: Normal rate and regular rhythm. Pulses: Normal pulses. Heart sounds: Normal heart sounds. No murmur. No gallop. Pulmonary:      Effort: No respiratory distress. Breath sounds: Normal breath sounds. No wheezing, rhonchi or rales. Musculoskeletal:      Right lower leg: No edema. Left lower leg: No edema. Lymphadenopathy:      Cervical: No cervical adenopathy. Neurological:      Mental Status: He is alert and oriented to person, place, and time.          Assessment:      htn well controlled  chf well controlled and off lasix due to elevated creatinine    Atrial  fib and is in regular rhythm with out recurrence  Obesity unchanged   Plan:    Advised to stay off of furosemide   Continue current medications  Will check BMP  See in  3 months         Sarah Gilbert MD

## 2021-05-19 ENCOUNTER — OFFICE VISIT (OUTPATIENT)
Dept: INTERNAL MEDICINE CLINIC | Age: 81
End: 2021-05-19
Payer: MEDICARE

## 2021-05-19 DIAGNOSIS — Z00.00 ROUTINE GENERAL MEDICAL EXAMINATION AT A HEALTH CARE FACILITY: Primary | ICD-10-CM

## 2021-05-19 PROCEDURE — 4040F PNEUMOC VAC/ADMIN/RCVD: CPT | Performed by: NURSE PRACTITIONER

## 2021-05-19 PROCEDURE — G0438 PPPS, INITIAL VISIT: HCPCS | Performed by: NURSE PRACTITIONER

## 2021-05-19 PROCEDURE — 1123F ACP DISCUSS/DSCN MKR DOCD: CPT | Performed by: NURSE PRACTITIONER

## 2021-05-19 ASSESSMENT — PATIENT HEALTH QUESTIONNAIRE - PHQ9
SUM OF ALL RESPONSES TO PHQ QUESTIONS 1-9: 0
SUM OF ALL RESPONSES TO PHQ9 QUESTIONS 1 & 2: 0
1. LITTLE INTEREST OR PLEASURE IN DOING THINGS: 0
SUM OF ALL RESPONSES TO PHQ QUESTIONS 1-9: 0

## 2021-05-19 ASSESSMENT — LIFESTYLE VARIABLES
HAS A RELATIVE, FRIEND, DOCTOR, OR ANOTHER HEALTH PROFESSIONAL EXPRESSED CONCERN ABOUT YOUR DRINKING OR SUGGESTED YOU CUT DOWN: NO
AUDIT TOTAL SCORE: 0
AUDIT-C TOTAL SCORE: 4
HOW OFTEN DURING THE LAST YEAR HAVE YOU BEEN UNABLE TO REMEMBER WHAT HAPPENED THE NIGHT BEFORE BECAUSE YOU HAD BEEN DRINKING: NEVER
AUDIT-C TOTAL SCORE: 0
HOW MANY STANDARD DRINKS CONTAINING ALCOHOL DO YOU HAVE ON A TYPICAL DAY: 0
HAS A RELATIVE, FRIEND, DOCTOR, OR ANOTHER HEALTH PROFESSIONAL EXPRESSED CONCERN ABOUT YOUR DRINKING OR SUGGESTED YOU CUT DOWN: 0
HAVE YOU OR SOMEONE ELSE BEEN INJURED AS A RESULT OF YOUR DRINKING: NO
HOW OFTEN DURING THE LAST YEAR HAVE YOU BEEN UNABLE TO REMEMBER WHAT HAPPENED THE NIGHT BEFORE BECAUSE YOU HAD BEEN DRINKING: 0
HOW OFTEN DO YOU HAVE SIX OR MORE DRINKS ON ONE OCCASION: 0
HOW OFTEN DURING THE LAST YEAR HAVE YOU FOUND THAT YOU WERE NOT ABLE TO STOP DRINKING ONCE YOU HAD STARTED: NEVER
AUDIT TOTAL SCORE: 4
HAVE YOU OR SOMEONE ELSE BEEN INJURED AS A RESULT OF YOUR DRINKING: 0
HOW OFTEN DURING THE LAST YEAR HAVE YOU FOUND THAT YOU WERE NOT ABLE TO STOP DRINKING ONCE YOU HAD STARTED: 0
HOW OFTEN DURING THE LAST YEAR HAVE YOU HAD A FEELING OF GUILT OR REMORSE AFTER DRINKING: 0
HOW OFTEN DURING THE LAST YEAR HAVE YOU HAD A FEELING OF GUILT OR REMORSE AFTER DRINKING: NEVER
HOW OFTEN DURING THE LAST YEAR HAVE YOU FAILED TO DO WHAT WAS NORMALLY EXPECTED FROM YOU BECAUSE OF DRINKING: 0
HOW OFTEN DURING THE LAST YEAR HAVE YOU NEEDED AN ALCOHOLIC DRINK FIRST THING IN THE MORNING TO GET YOURSELF GOING AFTER A NIGHT OF HEAVY DRINKING: NEVER
HOW OFTEN DO YOU HAVE A DRINK CONTAINING ALCOHOL: 4
HOW MANY STANDARD DRINKS CONTAINING ALCOHOL DO YOU HAVE ON A TYPICAL DAY: ONE OR TWO
HOW OFTEN DURING THE LAST YEAR HAVE YOU FAILED TO DO WHAT WAS NORMALLY EXPECTED FROM YOU BECAUSE OF DRINKING: NEVER
HOW OFTEN DO YOU HAVE SIX OR MORE DRINKS ON ONE OCCASION: NEVER
HOW OFTEN DURING THE LAST YEAR HAVE YOU NEEDED AN ALCOHOLIC DRINK FIRST THING IN THE MORNING TO GET YOURSELF GOING AFTER A NIGHT OF HEAVY DRINKING: 0
HOW OFTEN DO YOU HAVE A DRINK CONTAINING ALCOHOL: FOUR OR MORE TIMES A WEEK

## 2021-05-19 NOTE — PROGRESS NOTES
Team:  Melissa Llamas MD as PCP - Cain Isaacs MD as PCP - [facility] Empaneled Provider    Wt Readings from Last 3 Encounters:   04/26/21 230 lb (104.3 kg)   01/25/21 231 lb 6.4 oz (105 kg)   09/30/20 230 lb 12.8 oz (104.7 kg)      No flowsheet data found. There is no height or weight on file to calculate BMI. Based upon direct observation of the patient, evaluation of cognition reveals recent and remote memory intact. Patient's complete Health Risk Assessment and screening values have been reviewed and are found in Flowsheets. The following problems were reviewed today and where indicated follow up appointments were made and/or referrals ordered. Positive Risk Factor Screenings with Interventions:          General Health and ACP:  General  In general, how would you say your health is?: Very Good  In the past 7 days, have you experienced any of the following?  New or Increased Pain, New or Increased Fatigue, Loneliness, Social Isolation, Stress or Anger?: None of These  Do you get the social and emotional support that you need?: Yes  Do you have a Living Will?: Yes  Advance Directives     Power of  Living Will ACP-Advance Directive ACP-Power of     Not on File Not on File Not on File Not on File      General Health Risk Interventions:  ·     Health Habits/Nutrition:  Health Habits/Nutrition  Do you exercise for at least 20 minutes 2-3 times per week?: Yes  Have you lost any weight without trying in the past 3 months?: No  Do you eat only one meal per day?: No  Have you seen the dentist within the past year?: Yes     Health Habits/Nutrition Interventions:  ·        Personalized Preventive Plan   Current Health Maintenance Status  Immunization History   Administered Date(s) Administered    DTaP 09/26/2007    Influenza Whole 10/11/2010    Influenza, High Dose (Fluzone 65 yrs and older) 09/10/2012, 10/08/2013, 10/27/2014, 10/22/2015, 11/01/2017, 10/17/2018    Influenza, Quadv, Recombinant, IM PF (Flublok 18 yrs and older) 10/17/2019    Influenza, Quadv, adjuvanted, 65 yrs +, IM, PF (Fluad) 09/30/2020    Pneumococcal Conjugate 13-valent ([id number]) 04/11/2016    Pneumococcal Polysaccharide ([id number]) 12/17/2012    Td (Adult), 2 Lf Tetanus Toxoid, Pf (Td, Absorbed) 04/29/2019        Health Maintenance   Topic Date Due    Hepatitis C screen  Never done    Shingles Vaccine (1 of 2) Never done   [employer] Visit (AWV)  Never done    Lipid screen  08/20/2021    TSH testing  01/25/2022    Potassium monitoring  04/26/2022    Creatinine monitoring  04/26/2022    DTaP/Tdap/Td vaccine (3 - Tdap) 04/29/2029    Flu vaccine  Completed    Pneumococcal 65+ years Vaccine  Completed    COVID-19 Vaccine  Completed    Hepatitis A vaccine  Aged Out    Hepatitis B vaccine  Aged Out    Hib vaccine  Aged Out    Meningococcal (ACWY) vaccine  Aged Out     Recommendations for Edgar Due: see orders and patient instructions/AVS.  . Recommended screening schedule for the next 5-10 years is provided to the patient in written form: see Patient Bindu Sifuentes was seen today for medicare awv. Diagnoses and all orders for this visit:    Routine general medical examination at a health care facility               Thi New is a 68 y.o. male being evaluated by a Virtual Visit (phone) encounter to address concerns as mentioned above. A caregiver was present when appropriate. Due to this being a TeleHealth encounter (During ABSBL-91 public health emergency), evaluation of the following organ systems was limited: Vitals/Constitutional/EENT/Resp/CV/GI//MS/Neuro/Skin/Heme-Lymph-Imm.   Pursuant to the emergency declaration under the [facility] waiver authority and the [employer] Act, this Virtual Visit was conducted with patient's (and/or legal guardian's) consent, to reduce the patient's risk of exposure to COVID-19 and provide necessary medical care. The patient (and/or legal guardian) has also been advised to contact this office for worsening conditions or problems, and seek emergency medical treatment and/or call 911 if deemed necessary. Patient identification was verified at the start of the visit: Yes    Services were provided through phone to substitute for in-person clinic visit. Patient and provider were located at their individual homes. --ARACELY Lund CNP on 5/19/2021 at 1:16 PM    An electronic signature was used to authenticate this note.

## 2021-05-27 ENCOUNTER — NURSE ONLY (OUTPATIENT)
Dept: INTERNAL MEDICINE CLINIC | Age: 81
End: 2021-05-27
Payer: MEDICARE

## 2021-05-27 DIAGNOSIS — N28.9 ABNORMAL KIDNEY FUNCTION: Primary | ICD-10-CM

## 2021-05-27 LAB
ANION GAP SERPL CALCULATED.3IONS-SCNC: 13 MMOL/L (ref 3–16)
BUN BLDV-MCNC: 27 MG/DL (ref 7–20)
CALCIUM SERPL-MCNC: 9 MG/DL (ref 8.3–10.6)
CHLORIDE BLD-SCNC: 104 MMOL/L (ref 99–110)
CO2: 24 MMOL/L (ref 21–32)
CREAT SERPL-MCNC: 1.4 MG/DL (ref 0.8–1.3)
GFR AFRICAN AMERICAN: 59
GFR NON-AFRICAN AMERICAN: 49
GLUCOSE BLD-MCNC: 142 MG/DL (ref 70–99)
POTASSIUM SERPL-SCNC: 4.2 MMOL/L (ref 3.5–5.1)
SODIUM BLD-SCNC: 141 MMOL/L (ref 136–145)

## 2021-05-27 PROCEDURE — 36415 COLL VENOUS BLD VENIPUNCTURE: CPT | Performed by: INTERNAL MEDICINE

## 2021-06-08 ENCOUNTER — APPOINTMENT (OUTPATIENT)
Dept: CT IMAGING | Age: 81
DRG: 418 | End: 2021-06-08
Payer: MEDICARE

## 2021-06-08 ENCOUNTER — HOSPITAL ENCOUNTER (EMERGENCY)
Age: 81
Discharge: HOME | DRG: 418 | End: 2021-06-08
Attending: EMERGENCY MEDICINE
Payer: MEDICARE

## 2021-06-08 VITALS
WEIGHT: 224.21 LBS | DIASTOLIC BLOOD PRESSURE: 59 MMHG | TEMPERATURE: 97.8 F | BODY MASS INDEX: 37.36 KG/M2 | HEIGHT: 65 IN | SYSTOLIC BLOOD PRESSURE: 168 MMHG | HEART RATE: 57 BPM | OXYGEN SATURATION: 90 % | RESPIRATION RATE: 20 BRPM

## 2021-06-08 DIAGNOSIS — R10.84 GENERALIZED ABDOMINAL PAIN: Primary | ICD-10-CM

## 2021-06-08 LAB
A/G RATIO: 1.7 (ref 1.1–2.2)
ALBUMIN SERPL-MCNC: 4.3 G/DL (ref 3.4–5)
ALP BLD-CCNC: 51 U/L (ref 40–129)
ALT SERPL-CCNC: 12 U/L (ref 10–40)
ANION GAP SERPL CALCULATED.3IONS-SCNC: 9 MMOL/L (ref 3–16)
AST SERPL-CCNC: 12 U/L (ref 15–37)
BASOPHILS ABSOLUTE: 0 K/UL (ref 0–0.2)
BASOPHILS RELATIVE PERCENT: 0.3 %
BILIRUB SERPL-MCNC: 0.5 MG/DL (ref 0–1)
BILIRUBIN URINE: NEGATIVE
BLOOD, URINE: NEGATIVE
BUN BLDV-MCNC: 30 MG/DL (ref 7–20)
CALCIUM SERPL-MCNC: 8.8 MG/DL (ref 8.3–10.6)
CHLORIDE BLD-SCNC: 100 MMOL/L (ref 99–110)
CLARITY: CLEAR
CO2: 25 MMOL/L (ref 21–32)
COLOR: YELLOW
CREAT SERPL-MCNC: 1.2 MG/DL (ref 0.8–1.3)
EKG ATRIAL RATE: 52 BPM
EKG DIAGNOSIS: NORMAL
EKG P AXIS: 59 DEGREES
EKG P-R INTERVAL: 230 MS
EKG Q-T INTERVAL: 502 MS
EKG QRS DURATION: 96 MS
EKG QTC CALCULATION (BAZETT): 466 MS
EKG R AXIS: -12 DEGREES
EKG T AXIS: 12 DEGREES
EKG VENTRICULAR RATE: 52 BPM
EOSINOPHILS ABSOLUTE: 0.1 K/UL (ref 0–0.6)
EOSINOPHILS RELATIVE PERCENT: 1 %
EPITHELIAL CELLS, UA: 0 /HPF (ref 0–5)
GFR AFRICAN AMERICAN: >60
GFR NON-AFRICAN AMERICAN: 58
GLOBULIN: 2.5 G/DL
GLUCOSE BLD-MCNC: 186 MG/DL (ref 70–99)
GLUCOSE URINE: NEGATIVE MG/DL
HCT VFR BLD CALC: 43.9 % (ref 40.5–52.5)
HEMOGLOBIN: 14.8 G/DL (ref 13.5–17.5)
HYALINE CASTS: 1 /LPF (ref 0–8)
KETONES, URINE: NEGATIVE MG/DL
LACTIC ACID: 1.6 MMOL/L (ref 0.4–2)
LEUKOCYTE ESTERASE, URINE: NEGATIVE
LIPASE: 37 U/L (ref 13–60)
LYMPHOCYTES ABSOLUTE: 1.3 K/UL (ref 1–5.1)
LYMPHOCYTES RELATIVE PERCENT: 10.4 %
MCH RBC QN AUTO: 31.3 PG (ref 26–34)
MCHC RBC AUTO-ENTMCNC: 33.7 G/DL (ref 31–36)
MCV RBC AUTO: 93.1 FL (ref 80–100)
MICROSCOPIC EXAMINATION: YES
MONOCYTES ABSOLUTE: 0.8 K/UL (ref 0–1.3)
MONOCYTES RELATIVE PERCENT: 5.9 %
NEUTROPHILS ABSOLUTE: 10.5 K/UL (ref 1.7–7.7)
NEUTROPHILS RELATIVE PERCENT: 82.4 %
NITRITE, URINE: NEGATIVE
PDW BLD-RTO: 13.1 % (ref 12.4–15.4)
PH UA: 5.5 (ref 5–8)
PLATELET # BLD: 154 K/UL (ref 135–450)
PMV BLD AUTO: 9.6 FL (ref 5–10.5)
POTASSIUM REFLEX MAGNESIUM: 4.5 MMOL/L (ref 3.5–5.1)
PROTEIN UA: ABNORMAL MG/DL
RBC # BLD: 4.72 M/UL (ref 4.2–5.9)
RBC UA: 1 /HPF (ref 0–4)
SODIUM BLD-SCNC: 134 MMOL/L (ref 136–145)
SPECIFIC GRAVITY UA: >1.03 (ref 1–1.03)
TOTAL PROTEIN: 6.8 G/DL (ref 6.4–8.2)
URINE REFLEX TO CULTURE: ABNORMAL
URINE TYPE: ABNORMAL
UROBILINOGEN, URINE: 0.2 E.U./DL
WBC # BLD: 12.8 K/UL (ref 4–11)
WBC UA: 0 /HPF (ref 0–5)

## 2021-06-08 PROCEDURE — 83605 ASSAY OF LACTIC ACID: CPT

## 2021-06-08 PROCEDURE — 93005 ELECTROCARDIOGRAM TRACING: CPT | Performed by: STUDENT IN AN ORGANIZED HEALTH CARE EDUCATION/TRAINING PROGRAM

## 2021-06-08 PROCEDURE — 81001 URINALYSIS AUTO W/SCOPE: CPT

## 2021-06-08 PROCEDURE — 6360000002 HC RX W HCPCS: Performed by: EMERGENCY MEDICINE

## 2021-06-08 PROCEDURE — 2580000003 HC RX 258: Performed by: EMERGENCY MEDICINE

## 2021-06-08 PROCEDURE — 6360000004 HC RX CONTRAST MEDICATION: Performed by: EMERGENCY MEDICINE

## 2021-06-08 PROCEDURE — 36415 COLL VENOUS BLD VENIPUNCTURE: CPT

## 2021-06-08 PROCEDURE — 99284 EMERGENCY DEPT VISIT MOD MDM: CPT

## 2021-06-08 PROCEDURE — 74177 CT ABD & PELVIS W/CONTRAST: CPT

## 2021-06-08 PROCEDURE — 85025 COMPLETE CBC W/AUTO DIFF WBC: CPT

## 2021-06-08 PROCEDURE — 93010 ELECTROCARDIOGRAM REPORT: CPT | Performed by: INTERNAL MEDICINE

## 2021-06-08 PROCEDURE — 96374 THER/PROPH/DIAG INJ IV PUSH: CPT

## 2021-06-08 PROCEDURE — 83690 ASSAY OF LIPASE: CPT

## 2021-06-08 PROCEDURE — 96375 TX/PRO/DX INJ NEW DRUG ADDON: CPT

## 2021-06-08 PROCEDURE — 80053 COMPREHEN METABOLIC PANEL: CPT

## 2021-06-08 RX ORDER — METRONIDAZOLE 500 MG/1
500 TABLET ORAL 3 TIMES DAILY
Qty: 30 TABLET | Refills: 0 | Status: ON HOLD | OUTPATIENT
Start: 2021-06-08 | End: 2021-06-14 | Stop reason: SDUPTHER

## 2021-06-08 RX ORDER — ONDANSETRON 4 MG/1
4 TABLET, ORALLY DISINTEGRATING ORAL 4 TIMES DAILY PRN
Qty: 20 TABLET | Refills: 0 | Status: SHIPPED | OUTPATIENT
Start: 2021-06-08 | End: 2022-07-26

## 2021-06-08 RX ORDER — 0.9 % SODIUM CHLORIDE 0.9 %
500 INTRAVENOUS SOLUTION INTRAVENOUS ONCE
Status: COMPLETED | OUTPATIENT
Start: 2021-06-08 | End: 2021-06-08

## 2021-06-08 RX ORDER — TRAMADOL HYDROCHLORIDE 50 MG/1
50 TABLET ORAL EVERY 6 HOURS PRN
Qty: 12 TABLET | Refills: 0 | Status: ON HOLD | OUTPATIENT
Start: 2021-06-08 | End: 2021-06-14 | Stop reason: HOSPADM

## 2021-06-08 RX ORDER — MORPHINE SULFATE 4 MG/ML
4 INJECTION, SOLUTION INTRAMUSCULAR; INTRAVENOUS ONCE
Status: COMPLETED | OUTPATIENT
Start: 2021-06-08 | End: 2021-06-08

## 2021-06-08 RX ORDER — ONDANSETRON 2 MG/ML
4 INJECTION INTRAMUSCULAR; INTRAVENOUS ONCE
Status: COMPLETED | OUTPATIENT
Start: 2021-06-08 | End: 2021-06-08

## 2021-06-08 RX ORDER — CIPROFLOXACIN 500 MG/1
500 TABLET, FILM COATED ORAL 2 TIMES DAILY
Qty: 20 TABLET | Refills: 0 | Status: ON HOLD | OUTPATIENT
Start: 2021-06-08 | End: 2021-06-14 | Stop reason: SDUPTHER

## 2021-06-08 RX ADMIN — SODIUM CHLORIDE 500 ML: 9 INJECTION, SOLUTION INTRAVENOUS at 12:40

## 2021-06-08 RX ADMIN — IOPAMIDOL 75 ML: 755 INJECTION, SOLUTION INTRAVENOUS at 13:06

## 2021-06-08 RX ADMIN — MORPHINE SULFATE 4 MG: 4 INJECTION, SOLUTION INTRAMUSCULAR; INTRAVENOUS at 12:43

## 2021-06-08 RX ADMIN — ONDANSETRON 4 MG: 2 INJECTION INTRAMUSCULAR; INTRAVENOUS at 12:42

## 2021-06-08 ASSESSMENT — ENCOUNTER SYMPTOMS
DIARRHEA: 0
EYE REDNESS: 0
ABDOMINAL PAIN: 1
SHORTNESS OF BREATH: 0
CONSTIPATION: 1
BACK PAIN: 0
NAUSEA: 1
SORE THROAT: 0
WHEEZING: 0
EYE DISCHARGE: 0
RHINORRHEA: 0
EYE PAIN: 0
COUGH: 0
VOMITING: 1

## 2021-06-08 ASSESSMENT — PAIN DESCRIPTION - LOCATION
LOCATION: ABDOMEN

## 2021-06-08 ASSESSMENT — PAIN DESCRIPTION - DESCRIPTORS
DESCRIPTORS: CRAMPING
DESCRIPTORS: ACHING
DESCRIPTORS: SHARP
DESCRIPTORS: SHARP

## 2021-06-08 ASSESSMENT — PAIN SCALES - GENERAL
PAINLEVEL_OUTOF10: 9
PAINLEVEL_OUTOF10: 3
PAINLEVEL_OUTOF10: 9
PAINLEVEL_OUTOF10: 4

## 2021-06-08 ASSESSMENT — PAIN DESCRIPTION - PAIN TYPE
TYPE: ACUTE PAIN

## 2021-06-08 NOTE — ED NOTES
D/C: Order noted for d/c. Pt confirmed d/c paperwork & 4 RX have correct name. Discharge and education instructions reviewed with patient. Teach-back successful. Pt verbalized understanding and signed d/c papers. Pt denied questions at this time. No acute distress noted. Patient instructed to follow-up as noted - return to emergency department if symptoms worsen. Patient verbalized understanding. Discharged per EDMD with discharge instructions. Pt discharged to private vehicle. Patient stable upon departure. Thanked patient for choosing [facility] for care. Provider aware of patient pain at time of discharge.        Nahed Corrigan, BANDAR  06/08/21 2069

## 2021-06-08 NOTE — ED PROVIDER NOTES
adenopathy. Psychiatric/Behavioral: Negative for suicidal ideas. The patient is not nervous/anxious. PAST MEDICAL HISTORY     Past Medical History:   Diagnosis Date    Atrial fibrillation ([address]     CHF (congestive heart failure) (HCC)     Diverticulosis     Hyperlipidemia     Hypertension     Obesity     Unspecified sleep apnea          SURGICAL HISTORY     Past Surgical History:   Procedure Laterality Date    COLONOSCOPY  3/2007.;;    polypectomy-DR. Garza.  COLONOSCOPY      DR. Ashraf-polypectomy x 4    COLONOSCOPY  2017    Dr Ashraf-polypectomy x -tubular adenomas    COLONOSCOPY N/A 2020    COLONOSCOPY POLYPECTOMY SNARE/COLD BIOPSY performed by Wing Ebony MD at [place] [address]       Previous Medications    AMIODARONE (CORDARONE) 200 MG TABLET    TAKE 1 TABLET EVERY DAY    ASPIRIN EC 81 MG EC TABLET    Take 1 tablet by mouth daily. ATORVASTATIN (LIPITOR) 80 MG TABLET    TAKE 1 TABLET EVERY DAY    CARVEDILOL (COREG) 25 MG TABLET    TAKE 1 TABLET TWICE DAILY WITH MEALS    LANSOPRAZOLE (PREVACID) 30 MG CAPSULE    Take 30 mg by mouth daily. SPIRONOLACTONE (ALDACTONE) 25 MG TABLET    TAKE 1 TABLET EVERY DAY       ALLERGIES     Patient has no known allergies.     FAMILY HISTORY       Family History   Problem Relation Age of Onset    Cancer Father         prostate    Diabetes Sister           SOCIAL HISTORY       Social History     Socioeconomic History    Marital status:      Spouse name: None    Number of children: None    Years of education: None    Highest education level: None   Occupational History    None   Tobacco Use    Smoking status: Former Smoker     Packs/day: 0.25     Years: 5.00     Pack years: 1.25     Quit date: 10/1/1990     Years since quittin.7    Smokeless tobacco: Never Used    Tobacco comment: quit 25 years ago   Vaping Use    Vaping Use: Never used   Substance and Sexual Activity    Alcohol use: Yes     Alcohol/week: 2.0 standard drinks     Types: 1 Glasses of wine, 1 Cans of beer per week    Drug use: No    Sexual activity: None   Other Topics Concern    None   Social History Narrative    None     Social Determinants of Health     Financial Resource Strain:     Difficulty of Paying Living Expenses:    Food Insecurity:     Worried About Running Out of Food in the Last Year:     Ran Out of Food in the Last Year:    Transportation Needs:     Lack of Transportation (Medical):  Lack of Transportation (Non-Medical):    Physical Activity:     Days of Exercise per Week:     Minutes of Exercise per Session:    Stress:     Feeling of Stress :    Social Connections:     Frequency of Communication with Friends and Family:     Frequency of Social Gatherings with Friends and Family:     Attends L[employer] Services:     Active Member of Clubs or Organizations:     Attends Club or Organization Meetings:     Marital Status:    Intimate Partner Violence:     Fear of Current or Ex-Partner:     Emotionally Abused:     Physically Abused:     Sexually Abused:        SCREENINGS             PHYSICAL EXAM    (up to 7 for level 4, 8 or more for level 5)     ED Triage Vitals [06/08/21 1130]   BP Temp Temp Source Pulse Resp SpO2 Height Weight   (!) 170/59 97.8 °F (36.6 °C) Temporal (!) 48 18 96 % 5' 4.5\" (1.638 m) 224 lb 3.3 oz (101.7 kg)      height is 5' 4.5\" (1.638 m) and weight is 224 lb 3.3 oz (101.7 kg). His temporal temperature is 97.8 °F (36.6 °C). His blood pressure is 178/62 (abnormal) and his pulse is 63. His respiration is 18 and oxygen saturation is 96%. Physical Exam  Constitutional:       Appearance: He is well-developed. He is not diaphoretic. HENT:      Head: Normocephalic and atraumatic. Right Ear: External ear normal.      Left Ear: External ear normal.   Eyes:      General: No scleral icterus. Right eye: No discharge. Left eye: No discharge.    Neck:      Thyroid: No thyromegaly. Vascular: No JVD. Trachea: No tracheal deviation. Cardiovascular:      Rate and Rhythm: Normal rate and regular rhythm. Heart sounds: No murmur heard. No friction rub. No gallop. Pulmonary:      Effort: Pulmonary effort is normal. No respiratory distress. Breath sounds: Normal breath sounds. No stridor. No wheezing or rales. Abdominal:      General: There is no distension. Palpations: Abdomen is soft. Tenderness: There is generalized abdominal tenderness. There is no guarding or rebound. Musculoskeletal:         General: No tenderness. Cervical back: Normal range of motion. Skin:     General: Skin is warm and dry. Findings: No rash (On exposed body surfaces). Neurological:      Mental Status: He is alert and oriented to person, place, and time. Coordination: Coordination normal.   Psychiatric:         Behavior: Behavior normal.         Thought Content:  Thought content normal.         DIAGNOSTIC RESULTS   LABS:    Results for orders placed or performed during the hospital encounter of 06/08/21   CBC Auto Differential   Result Value Ref Range    WBC 12.8 (H) 4.0 - 11.0 K/uL    RBC 4.72 4.20 - 5.90 M/uL    Hemoglobin 14.8 13.5 - 17.5 g/dL    Hematocrit 43.9 40.5 - 52.5 %    MCV 93.1 80.0 - 100.0 fL    MCH 31.3 26.0 - 34.0 pg    MCHC 33.7 31.0 - 36.0 g/dL    RDW 13.1 12.4 - 15.4 %    Platelets 945 638 - 505 K/uL    MPV 9.6 5.0 - 10.5 fL    Neutrophils % 82.4 %    Lymphocytes % 10.4 %    Monocytes % 5.9 %    Eosinophils % 1.0 %    Basophils % 0.3 %    Neutrophils Absolute 10.5 (H) 1.7 - 7.7 K/uL    Lymphocytes Absolute 1.3 1.0 - 5.1 K/uL    Monocytes Absolute 0.8 0.0 - 1.3 K/uL    Eosinophils Absolute 0.1 0.0 - 0.6 K/uL    Basophils Absolute 0.0 0.0 - 0.2 K/uL   Comprehensive Metabolic Panel w/ Reflex to MG   Result Value Ref Range    Sodium 134 (L) 136 - 145 mmol/L    Potassium reflex Magnesium 4.5 3.5 - 5.1 mmol/L    Chloride 100 99 - 110 2:43:35 PM       All other labs were within normal range or not returned as of this dictation. EKG: All EKG's are interpreted by the Emergency Department Physician who either signs orCo-signs this chart in the absence of a cardiologist.    EKG visualized preliminarily interpreted by myself. Rhythm is sinus bradycardia with first-degree AV block. Axis is -12. Otherwise ST and T waves are within normal limits. Other intervals are normal.    RADIOLOGY:   Non-plain film images such as CT, Ultrasound and MRI are read by the radiologist. Jenna Marie radiographic images are visualized and preliminarily interpreted by the  EDProvider with the below findings:    CT ABDOMEN PELVIS W IV CONTRAST Additional Contrast? None    Result Date: 6/8/2021  EXAMINATION: CT OF THE ABDOMEN AND PELVIS WITH CONTRAST 6/8/2021 12:58 pm TECHNIQUE: CT of the abdomen and pelvis was performed with the administration of intravenous contrast. Multiplanar reformatted images are provided for review. Dose modulation, iterative reconstruction, and/or weight based adjustment of the mA/kV was utilized to reduce the radiation dose to as low as reasonably achievable. COMPARISON: None. HISTORY: ORDERING SYSTEM PROVIDED HISTORY: abd pain TECHNOLOGIST PROVIDED HISTORY: Additional Contrast?->None Reason for exam:->abd pain Decision Support Exception - unselect if not a suspected or confirmed emergency medical condition->Emergency Medical Condition ([place]) Reason for Exam: abd pain, vomiting FINDINGS: Lower Chest:The lung bases are clear Organs: The liver, spleen, adrenal glands, kidneys, and pancreas demonstrate no acute abnormality. GI/Bowel: The visualized portions of the bowel are unremarkable.  Peritoneum/Retroperitoneum: Unremarkable Pelvis: Unremarkable Bones: No suspicious osseous lesions are identified     No acute intra-abdominal abnormality           PROCEDURES   Unless otherwise noted below, none     Procedures    CRITICAL CARE TIME N/A    CONSULTS:  IP CONSULT TO PRIMARY CARE PROVIDER    EMERGENCY DEPARTMENT COURSE and DIFFERENTIAL DIAGNOSIS/MDM:   Vitals:    Vitals:    06/08/21 1249 06/08/21 1252 06/08/21 1318 06/08/21 1332   BP:   (!) 171/58 (!) 178/62   Pulse: 54 52 60 63   Resp: 16 9 21 18   Temp:       TempSrc:       SpO2: (!) 86% 98% 96% 96%   Weight:       Height:           Patient was given the following medications:  Medications   0.9 % sodium chloride bolus (0 mLs Intravenous Stopped 6/8/21 1340)   morphine (PF) injection 4 mg (4 mg Intravenous Given 6/8/21 1243)   ondansetron (ZOFRAN) injection 4 mg (4 mg Intravenous Given 6/8/21 1242)   iopamidol (ISOVUE-370) 76 % injection 75 mL (75 mLs Intravenous Given 6/8/21 1306)       Patient has been quite stable. I initially gave him some morphine and Zofran. Morphine had a bit hard and he needed a little supplemental oxygen for a while. Spent a few hours in the morphine is wore off. The patient is feeling much better. He is tolerating liquids without difficulty. He very much wants to go home. Repeat abdominal exam is essentially very benign. Less tenderness than before but again he did get some morphine. My initial inclination was to keep the patient for observation but again he wants to go home. I discussed this case with his primary care provider who will follow him up on Friday. We are going to treat him empirically for diverticulitis with Cipro and Flagyl. Small supply of tramadol and Zofran as needed. Patient is encouraged to return here if he is not doing well. Otherwise take medications as prescribed and follow-up with Dr. Bhavana Vences in the office. Incidentally there is a cyst on the left kidney that is not mentioned in the radiology report but I doubt that is playing any role in the patient's symptoms. I did inform the patient of the results. FINAL IMPRESSION      1.  Generalized abdominal pain          DISPOSITION/PLAN    DISPOSITION Decision To Discharge 06/08/2021 03:13:07 PM      PATIENT REFERRED TO:  Piero Centeno MD  [address] [facility] [id number] [phone]    On 6/11/2021      [address] [place] [address] Emergency Department  [address] [place] [address]  [phone]    If symptoms worsen      DISCHARGE MEDICATIONS:  New Prescriptions    CIPROFLOXACIN (CIPRO) 500 MG TABLET    Take 1 tablet by mouth 2 times daily for 10 days    METRONIDAZOLE (FLAGYL) 500 MG TABLET    Take 1 tablet by mouth 3 times daily for 10 days    ONDANSETRON (ZOFRAN ODT) 4 MG DISINTEGRATING TABLET    Take 1 tablet by mouth 4 times daily as needed for Nausea or Vomiting    TRAMADOL (ULTRAM) 50 MG TABLET    Take 1 tablet by mouth every 6 hours as needed for Pain for up to 3 days. Intended supply: 3 days.  Take lowest dose possible to manage pain       DISCONTINUED MEDICATIONS:  Discontinued Medications    No medications on file              (Please note that portions of this note were completed with a voice recognition program.  Efforts were made to editthe dictations but occasionally words are mis-transcribed.)    Lauryn Garnett MD (electronically signed)            Lauryn Garnett MD  06/08/21 9342

## 2021-06-08 NOTE — ED NOTES
Pt placed on oxygen due to O2 of 87%, pt placed on 2L and brought up to 5L until oxygen came up to 93%.  Dr. Karthik Alston made aware     Timothy Hernadez RN  06/08/21 5498

## 2021-06-09 ENCOUNTER — APPOINTMENT (OUTPATIENT)
Dept: GENERAL RADIOLOGY | Age: 81
DRG: 418 | End: 2021-06-09
Attending: INTERNAL MEDICINE
Payer: MEDICARE

## 2021-06-09 ENCOUNTER — HOSPITAL ENCOUNTER (INPATIENT)
Age: 81
LOS: 5 days | Discharge: HOME | DRG: 418 | End: 2021-06-14
Attending: INTERNAL MEDICINE | Admitting: INTERNAL MEDICINE
Payer: MEDICARE

## 2021-06-09 ENCOUNTER — OFFICE VISIT (OUTPATIENT)
Dept: INTERNAL MEDICINE CLINIC | Age: 81
End: 2021-06-09
Payer: MEDICARE

## 2021-06-09 VITALS
TEMPERATURE: 97.8 F | SYSTOLIC BLOOD PRESSURE: 109 MMHG | HEART RATE: 93 BPM | HEIGHT: 66 IN | OXYGEN SATURATION: 94 % | BODY MASS INDEX: 37.64 KG/M2 | RESPIRATION RATE: 16 BRPM | WEIGHT: 234.2 LBS | DIASTOLIC BLOOD PRESSURE: 50 MMHG

## 2021-06-09 DIAGNOSIS — R10.84 GENERALIZED ABDOMINAL PAIN: ICD-10-CM

## 2021-06-09 DIAGNOSIS — K81.0 ACUTE CHOLECYSTITIS: Primary | ICD-10-CM

## 2021-06-09 DIAGNOSIS — R50.9 FEVER, UNSPECIFIED FEVER CAUSE: ICD-10-CM

## 2021-06-09 DIAGNOSIS — R06.02 SHORTNESS OF BREATH: ICD-10-CM

## 2021-06-09 PROBLEM — R10.9 CONTINUOUS SEVERE ABDOMINAL PAIN: Status: ACTIVE | Noted: 2021-06-09

## 2021-06-09 LAB
A/G RATIO: 1.4 (ref 1.1–2.2)
ALBUMIN SERPL-MCNC: 3.8 G/DL (ref 3.4–5)
ALP BLD-CCNC: 44 U/L (ref 40–129)
ALT SERPL-CCNC: 10 U/L (ref 10–40)
ANION GAP SERPL CALCULATED.3IONS-SCNC: 11 MMOL/L (ref 3–16)
AST SERPL-CCNC: 13 U/L (ref 15–37)
BASE EXCESS ARTERIAL: 2 MMOL/L (ref -3–3)
BASOPHILS ABSOLUTE: 0 K/UL (ref 0–0.2)
BASOPHILS RELATIVE PERCENT: 0 %
BILIRUB SERPL-MCNC: 1.4 MG/DL (ref 0–1)
BILIRUBIN URINE: NEGATIVE
BLOOD, URINE: NEGATIVE
BUN BLDV-MCNC: 22 MG/DL (ref 7–20)
C-REACTIVE PROTEIN: 263.9 MG/L (ref 0–5.1)
CALCIUM SERPL-MCNC: 8 MG/DL (ref 8.3–10.6)
CARBOXYHEMOGLOBIN ARTERIAL: 1.7 % (ref 0–1.5)
CHLORIDE BLD-SCNC: 96 MMOL/L (ref 99–110)
CLARITY: ABNORMAL
CO2: 22 MMOL/L (ref 21–32)
COLOR: YELLOW
CREAT SERPL-MCNC: 1.3 MG/DL (ref 0.8–1.3)
EOSINOPHILS ABSOLUTE: 0 K/UL (ref 0–0.6)
EOSINOPHILS RELATIVE PERCENT: 0 %
EPITHELIAL CELLS, UA: 0 /HPF (ref 0–5)
GFR AFRICAN AMERICAN: >60
GFR NON-AFRICAN AMERICAN: 53
GLOBULIN: 2.7 G/DL
GLUCOSE BLD-MCNC: 141 MG/DL (ref 70–99)
GLUCOSE URINE: NEGATIVE MG/DL
HCO3 ARTERIAL: 25.5 MMOL/L (ref 21–29)
HCT VFR BLD CALC: 38.7 % (ref 40.5–52.5)
HEMATOLOGY PATH CONSULT: NO
HEMOGLOBIN, ART, EXTENDED: 13.6 G/DL (ref 13.5–17.5)
HEMOGLOBIN: 13.5 G/DL (ref 13.5–17.5)
HYALINE CASTS: 0 /LPF (ref 0–8)
KETONES, URINE: NEGATIVE MG/DL
LEUKOCYTE ESTERASE, URINE: NEGATIVE
LIPASE: 17 U/L (ref 13–60)
LYMPHOCYTES ABSOLUTE: 0.8 K/UL (ref 1–5.1)
LYMPHOCYTES RELATIVE PERCENT: 4 %
MCH RBC QN AUTO: 31.7 PG (ref 26–34)
MCHC RBC AUTO-ENTMCNC: 34.9 G/DL (ref 31–36)
MCV RBC AUTO: 90.7 FL (ref 80–100)
METHEMOGLOBIN ARTERIAL: 0.6 %
MICROSCOPIC EXAMINATION: YES
MONOCYTES ABSOLUTE: 1.8 K/UL (ref 0–1.3)
MONOCYTES RELATIVE PERCENT: 9 %
NEUTROPHILS ABSOLUTE: 17.6 K/UL (ref 1.7–7.7)
NEUTROPHILS RELATIVE PERCENT: 87 %
NITRITE, URINE: NEGATIVE
O2 CONTENT ARTERIAL: 18 ML/DL
O2 SAT, ARTERIAL: 93.4 %
O2 THERAPY: ABNORMAL
PCO2 ARTERIAL: 35.3 MMHG (ref 35–45)
PDW BLD-RTO: 13.5 % (ref 12.4–15.4)
PH ARTERIAL: 7.47 (ref 7.35–7.45)
PH UA: 5.5 (ref 5–8)
PLATELET # BLD: 101 K/UL (ref 135–450)
PLATELET SLIDE REVIEW: ADEQUATE
PMV BLD AUTO: 9.3 FL (ref 5–10.5)
PO2 ARTERIAL: 61.7 MMHG (ref 75–108)
POTASSIUM SERPL-SCNC: 3.9 MMOL/L (ref 3.5–5.1)
PRO-BNP: 1399 PG/ML (ref 0–449)
PROTEIN UA: NEGATIVE MG/DL
RBC # BLD: 4.26 M/UL (ref 4.2–5.9)
RBC # BLD: NORMAL 10*6/UL
RBC UA: 1 /HPF (ref 0–4)
SEDIMENTATION RATE, ERYTHROCYTE: 42 MM/HR (ref 0–20)
SLIDE REVIEW: ABNORMAL
SODIUM BLD-SCNC: 129 MMOL/L (ref 136–145)
SPECIFIC GRAVITY UA: 1.01 (ref 1–1.03)
TCO2 ARTERIAL: 26.6 MMOL/L
TOTAL PROTEIN: 6.5 G/DL (ref 6.4–8.2)
URINE TYPE: ABNORMAL
UROBILINOGEN, URINE: 0.2 E.U./DL
WBC # BLD: 20.2 K/UL (ref 4–11)
WBC UA: 0 /HPF (ref 0–5)

## 2021-06-09 PROCEDURE — 2500000003 HC RX 250 WO HCPCS: Performed by: INTERNAL MEDICINE

## 2021-06-09 PROCEDURE — 85652 RBC SED RATE AUTOMATED: CPT

## 2021-06-09 PROCEDURE — 83880 ASSAY OF NATRIURETIC PEPTIDE: CPT

## 2021-06-09 PROCEDURE — 80053 COMPREHEN METABOLIC PANEL: CPT

## 2021-06-09 PROCEDURE — 81001 URINALYSIS AUTO W/SCOPE: CPT

## 2021-06-09 PROCEDURE — G8427 DOCREV CUR MEDS BY ELIG CLIN: HCPCS | Performed by: INTERNAL MEDICINE

## 2021-06-09 PROCEDURE — 94761 N-INVAS EAR/PLS OXIMETRY MLT: CPT

## 2021-06-09 PROCEDURE — 36415 COLL VENOUS BLD VENIPUNCTURE: CPT

## 2021-06-09 PROCEDURE — 6360000002 HC RX W HCPCS: Performed by: INTERNAL MEDICINE

## 2021-06-09 PROCEDURE — G8417 CALC BMI ABV UP PARAM F/U: HCPCS | Performed by: INTERNAL MEDICINE

## 2021-06-09 PROCEDURE — 1036F TOBACCO NON-USER: CPT | Performed by: INTERNAL MEDICINE

## 2021-06-09 PROCEDURE — 87040 BLOOD CULTURE FOR BACTERIA: CPT

## 2021-06-09 PROCEDURE — 86140 C-REACTIVE PROTEIN: CPT

## 2021-06-09 PROCEDURE — 1200000000 HC SEMI PRIVATE

## 2021-06-09 PROCEDURE — 71046 X-RAY EXAM CHEST 2 VIEWS: CPT

## 2021-06-09 PROCEDURE — 85025 COMPLETE CBC W/AUTO DIFF WBC: CPT

## 2021-06-09 PROCEDURE — 1123F ACP DISCUSS/DSCN MKR DOCD: CPT | Performed by: INTERNAL MEDICINE

## 2021-06-09 PROCEDURE — 99214 OFFICE O/P EST MOD 30 MIN: CPT | Performed by: INTERNAL MEDICINE

## 2021-06-09 PROCEDURE — 36600 WITHDRAWAL OF ARTERIAL BLOOD: CPT

## 2021-06-09 PROCEDURE — 82803 BLOOD GASES ANY COMBINATION: CPT

## 2021-06-09 PROCEDURE — 93005 ELECTROCARDIOGRAM TRACING: CPT | Performed by: INTERNAL MEDICINE

## 2021-06-09 PROCEDURE — 6370000000 HC RX 637 (ALT 250 FOR IP): Performed by: INTERNAL MEDICINE

## 2021-06-09 PROCEDURE — 83690 ASSAY OF LIPASE: CPT

## 2021-06-09 PROCEDURE — 4040F PNEUMOC VAC/ADMIN/RCVD: CPT | Performed by: INTERNAL MEDICINE

## 2021-06-09 PROCEDURE — 2580000003 HC RX 258: Performed by: INTERNAL MEDICINE

## 2021-06-09 RX ORDER — TRAMADOL HYDROCHLORIDE 50 MG/1
50 TABLET ORAL EVERY 6 HOURS PRN
Status: DISCONTINUED | OUTPATIENT
Start: 2021-06-09 | End: 2021-06-14 | Stop reason: HOSPADM

## 2021-06-09 RX ORDER — SODIUM CHLORIDE 0.9 % (FLUSH) 0.9 %
5-40 SYRINGE (ML) INJECTION EVERY 12 HOURS SCHEDULED
Status: DISCONTINUED | OUTPATIENT
Start: 2021-06-09 | End: 2021-06-14 | Stop reason: HOSPADM

## 2021-06-09 RX ORDER — ACETAMINOPHEN 650 MG/1
650 SUPPOSITORY RECTAL EVERY 6 HOURS PRN
Status: DISCONTINUED | OUTPATIENT
Start: 2021-06-09 | End: 2021-06-14 | Stop reason: HOSPADM

## 2021-06-09 RX ORDER — POLYETHYLENE GLYCOL 3350 17 G/17G
17 POWDER, FOR SOLUTION ORAL DAILY PRN
Status: DISCONTINUED | OUTPATIENT
Start: 2021-06-09 | End: 2021-06-14 | Stop reason: HOSPADM

## 2021-06-09 RX ORDER — ASPIRIN 81 MG/1
81 TABLET ORAL ONCE
Status: DISCONTINUED | OUTPATIENT
Start: 2021-06-09 | End: 2021-06-14 | Stop reason: HOSPADM

## 2021-06-09 RX ORDER — CARVEDILOL 25 MG/1
25 TABLET ORAL 2 TIMES DAILY WITH MEALS
Status: DISCONTINUED | OUTPATIENT
Start: 2021-06-09 | End: 2021-06-14 | Stop reason: HOSPADM

## 2021-06-09 RX ORDER — ONDANSETRON 2 MG/ML
4 INJECTION INTRAMUSCULAR; INTRAVENOUS EVERY 6 HOURS PRN
Status: DISCONTINUED | OUTPATIENT
Start: 2021-06-09 | End: 2021-06-14 | Stop reason: HOSPADM

## 2021-06-09 RX ORDER — CIPROFLOXACIN 2 MG/ML
400 INJECTION, SOLUTION INTRAVENOUS EVERY 12 HOURS
Status: DISCONTINUED | OUTPATIENT
Start: 2021-06-09 | End: 2021-06-14 | Stop reason: HOSPADM

## 2021-06-09 RX ORDER — ACETAMINOPHEN 325 MG/1
650 TABLET ORAL EVERY 6 HOURS PRN
Status: DISCONTINUED | OUTPATIENT
Start: 2021-06-09 | End: 2021-06-14 | Stop reason: HOSPADM

## 2021-06-09 RX ORDER — SPIRONOLACTONE 25 MG/1
25 TABLET ORAL DAILY
Status: DISCONTINUED | OUTPATIENT
Start: 2021-06-09 | End: 2021-06-14 | Stop reason: HOSPADM

## 2021-06-09 RX ORDER — AMIODARONE HYDROCHLORIDE 200 MG/1
200 TABLET ORAL DAILY
Status: DISCONTINUED | OUTPATIENT
Start: 2021-06-09 | End: 2021-06-14 | Stop reason: HOSPADM

## 2021-06-09 RX ORDER — SODIUM CHLORIDE 9 MG/ML
INJECTION, SOLUTION INTRAVENOUS CONTINUOUS
Status: DISCONTINUED | OUTPATIENT
Start: 2021-06-09 | End: 2021-06-14

## 2021-06-09 RX ORDER — SODIUM CHLORIDE 9 MG/ML
25 INJECTION, SOLUTION INTRAVENOUS PRN
Status: DISCONTINUED | OUTPATIENT
Start: 2021-06-09 | End: 2021-06-14 | Stop reason: HOSPADM

## 2021-06-09 RX ORDER — FUROSEMIDE 40 MG/1
40 TABLET ORAL ONCE
Status: COMPLETED | OUTPATIENT
Start: 2021-06-09 | End: 2021-06-09

## 2021-06-09 RX ORDER — ONDANSETRON 4 MG/1
4 TABLET, ORALLY DISINTEGRATING ORAL EVERY 8 HOURS PRN
Status: DISCONTINUED | OUTPATIENT
Start: 2021-06-09 | End: 2021-06-14 | Stop reason: HOSPADM

## 2021-06-09 RX ORDER — SODIUM CHLORIDE 0.9 % (FLUSH) 0.9 %
5-40 SYRINGE (ML) INJECTION PRN
Status: DISCONTINUED | OUTPATIENT
Start: 2021-06-09 | End: 2021-06-14 | Stop reason: HOSPADM

## 2021-06-09 RX ORDER — ATORVASTATIN CALCIUM 80 MG/1
80 TABLET, FILM COATED ORAL NIGHTLY
Status: DISCONTINUED | OUTPATIENT
Start: 2021-06-09 | End: 2021-06-14 | Stop reason: HOSPADM

## 2021-06-09 RX ORDER — PANTOPRAZOLE SODIUM 40 MG/1
40 TABLET, DELAYED RELEASE ORAL
Status: DISCONTINUED | OUTPATIENT
Start: 2021-06-10 | End: 2021-06-14 | Stop reason: HOSPADM

## 2021-06-09 RX ADMIN — AMIODARONE HYDROCHLORIDE 200 MG: 200 TABLET ORAL at 20:45

## 2021-06-09 RX ADMIN — SPIRONOLACTONE 25 MG: 25 TABLET ORAL at 20:48

## 2021-06-09 RX ADMIN — ATORVASTATIN CALCIUM 80 MG: 80 TABLET, FILM COATED ORAL at 20:45

## 2021-06-09 RX ADMIN — SODIUM CHLORIDE: 9 INJECTION, SOLUTION INTRAVENOUS at 23:56

## 2021-06-09 RX ADMIN — CIPROFLOXACIN 400 MG: 2 INJECTION, SOLUTION INTRAVENOUS at 21:23

## 2021-06-09 RX ADMIN — FUROSEMIDE 40 MG: 40 TABLET ORAL at 20:45

## 2021-06-09 RX ADMIN — ENOXAPARIN SODIUM 40 MG: 40 INJECTION SUBCUTANEOUS at 20:44

## 2021-06-09 RX ADMIN — CARVEDILOL 25 MG: 25 TABLET, FILM COATED ORAL at 20:44

## 2021-06-09 RX ADMIN — METRONIDAZOLE 500 MG: 500 INJECTION, SOLUTION INTRAVENOUS at 22:06

## 2021-06-09 SDOH — ECONOMIC STABILITY: FOOD INSECURITY: WITHIN THE PAST 12 MONTHS, THE FOOD YOU BOUGHT JUST DIDN'T LAST AND YOU DIDN'T HAVE MONEY TO GET MORE.: NEVER TRUE

## 2021-06-09 SDOH — ECONOMIC STABILITY: FOOD INSECURITY: WITHIN THE PAST 12 MONTHS, YOU WORRIED THAT YOUR FOOD WOULD RUN OUT BEFORE YOU GOT MONEY TO BUY MORE.: NEVER TRUE

## 2021-06-09 ASSESSMENT — PAIN DESCRIPTION - PAIN TYPE: TYPE: ACUTE PAIN

## 2021-06-09 ASSESSMENT — ENCOUNTER SYMPTOMS
SHORTNESS OF BREATH: 1
ABDOMINAL PAIN: 1
VOMITING: 1
SORE THROAT: 0
COUGH: 0
CHEST TIGHTNESS: 0
DIARRHEA: 0
NAUSEA: 1
WHEEZING: 1
CONSTIPATION: 1

## 2021-06-09 ASSESSMENT — PAIN DESCRIPTION - ONSET: ONSET: ON-GOING

## 2021-06-09 ASSESSMENT — PAIN SCALES - GENERAL: PAINLEVEL_OUTOF10: 9

## 2021-06-09 ASSESSMENT — PAIN DESCRIPTION - PROGRESSION: CLINICAL_PROGRESSION: GRADUALLY WORSENING

## 2021-06-09 ASSESSMENT — PAIN DESCRIPTION - LOCATION: LOCATION: ABDOMEN

## 2021-06-09 ASSESSMENT — PAIN DESCRIPTION - FREQUENCY: FREQUENCY: CONTINUOUS

## 2021-06-09 ASSESSMENT — PAIN DESCRIPTION - ORIENTATION: ORIENTATION: RIGHT;LOWER

## 2021-06-09 ASSESSMENT — SOCIAL DETERMINANTS OF HEALTH (SDOH): HOW HARD IS IT FOR YOU TO PAY FOR THE VERY BASICS LIKE FOOD, HOUSING, MEDICAL CARE, AND HEATING?: NOT HARD AT ALL

## 2021-06-09 ASSESSMENT — PAIN DESCRIPTION - DESCRIPTORS: DESCRIPTORS: SHARP

## 2021-06-09 ASSESSMENT — PAIN - FUNCTIONAL ASSESSMENT: PAIN_FUNCTIONAL_ASSESSMENT: ACTIVITIES ARE NOT PREVENTED

## 2021-06-09 NOTE — PROGRESS NOTES
Subjective:      Patient ID: Naomi Calix is a 68 y.o. male. Chief Complaint   Patient presents with    Follow-up     ER, stomach pain        HPI  See in er for abdominal pain and was sent home on antibiotics for possible diverticular infection   Still has abdominal pain and is on both sides and showing mid abdominal areas  Has no more nausea or vomiting  Did not have bm since yesterday am and appetite is poor  Has had fever on 100 last nights and this am   He feels cold and not able to breath well due to this but denies any chest pain cough wheezing . Review of Systems   Constitutional: Positive for appetite change, chills and fever. Negative for activity change and unexpected weight change. HENT: Negative for sore throat. Respiratory: Positive for shortness of breath and wheezing. Negative for cough and chest tightness. Cardiovascular: Negative for chest pain, palpitations and leg swelling. Gastrointestinal: Positive for abdominal pain, constipation, nausea and vomiting. Negative for diarrhea. Genitourinary: Negative. Neurological: Negative for dizziness, light-headedness and headaches. Current Outpatient Medications   Medication Sig Dispense Refill    metroNIDAZOLE (FLAGYL) 500 MG tablet Take 1 tablet by mouth 3 times daily for 10 days 30 tablet 0    ciprofloxacin (CIPRO) 500 MG tablet Take 1 tablet by mouth 2 times daily for 10 days 20 tablet 0    traMADol (ULTRAM) 50 MG tablet Take 1 tablet by mouth every 6 hours as needed for Pain for up to 3 days. Intended supply: 3 days.  Take lowest dose possible to manage pain 12 tablet 0    ondansetron (ZOFRAN ODT) 4 MG disintegrating tablet Take 1 tablet by mouth 4 times daily as needed for Nausea or Vomiting 20 tablet 0    spironolactone (ALDACTONE) 25 MG tablet TAKE 1 TABLET EVERY DAY 90 tablet 1    carvedilol (COREG) 25 MG tablet TAKE 1 TABLET TWICE DAILY WITH MEALS 180 tablet 1    amiodarone (CORDARONE) 200 MG tablet TAKE 1 TABLET EVERY DAY 90 tablet 1    atorvastatin (LIPITOR) 80 MG tablet TAKE 1 TABLET EVERY DAY 90 tablet 1    aspirin EC 81 MG EC tablet Take 1 tablet by mouth daily. 30 tablet 3    lansoprazole (PREVACID) 30 MG capsule Take 30 mg by mouth daily. No current facility-administered medications for this visit. Recent Labs     06/08/21  1137   WBC 12.8*   HGB 14.8   HCT 43.9   MCV 93.1           Lab Results   Component Value Date     06/08/2021    K 4.5 06/08/2021     06/08/2021    CO2 25 06/08/2021    BUN 30 06/08/2021    CREATININE 1.2 06/08/2021    GLUCOSE 186 06/08/2021    CALCIUM 8.8 06/08/2021        Lab Results   Component Value Date    CHOL 130 08/20/2020    CHOL 141 05/02/2019    CHOL 145 12/08/2017     Lab Results   Component Value Date    TRIG 182 (H) 08/20/2020    TRIG 164 (H) 05/02/2019    TRIG 150 12/08/2017     Lab Results   Component Value Date    HDL 33 (L) 08/20/2020    HDL 35 (L) 05/02/2019    HDL 43 12/08/2017     Lab Results   Component Value Date    LDLCALC 61 08/20/2020    LDLCALC 73 05/02/2019    LDLCALC 72 12/08/2017     Lab Results   Component Value Date    LABVLDL 36 08/20/2020    LABVLDL 33 05/02/2019    LABVLDL 30 12/08/2017     No results found for: CHOLHDLRATIO     Objective:   Physical Exam  Vitals and nursing note reviewed. Constitutional:       General: He is not in acute distress. HENT:      Nose: Congestion present. Mouth/Throat:      Mouth: Mucous membranes are moist.      Pharynx: Oropharynx is clear. Eyes:      General: No scleral icterus. Conjunctiva/sclera: Conjunctivae normal.      Pupils: Pupils are equal, round, and reactive to light. Neck:      Thyroid: No thyromegaly. Vascular: No carotid bruit or JVD. Cardiovascular:      Rate and Rhythm: Normal rate and regular rhythm. Pulses: Normal pulses. Heart sounds: Normal heart sounds. No gallop. Pulmonary:      Effort: No respiratory distress.       Breath sounds: Wheezing (on expiration) and rales (at bases right more than left) present. No rhonchi. Abdominal:      General: There is distension. Palpations: Abdomen is soft. There is no hepatomegaly, splenomegaly or mass. Tenderness: There is abdominal tenderness (right mid abdomen and not on left side). There is no right CVA tenderness, left CVA tenderness or guarding. Hernia: No hernia is present. Musculoskeletal:      Right lower leg: No edema. Left lower leg: No edema. Lymphadenopathy:      Cervical: No cervical adenopathy. Neurological:      Mental Status: He is alert and oriented to person, place, and time.          Assessment:      Cause of abdominal p[ain is not cl;ear-    Ct abdomen is normal yesterday and labs are unremarkable  Plan:      Discussed with him and porobably need to be admitted in hospital         Bronwyn Tee MD

## 2021-06-09 NOTE — PROGRESS NOTES
Pt departed floor for x ray via wheelchair.  Electronically signed by Sherryle Hill, RN on 6/9/2021 at 6:24 PM

## 2021-06-09 NOTE — PROGRESS NOTES
Pt arrived as a direct admit from the hospital. Pt is alert and oriented x 4. VS and HT assessment complete. Will cont to monitor and reassess.  Electronically signed by Isa Jiang RN on 6/9/2021 at 6:04 PM

## 2021-06-09 NOTE — PROGRESS NOTES
Pt is back in room, resting comfortably in bed after x ray. Patient A&O. No complaints at this time. Call light within reach, able to make needs knows, fall precautions in place. Will continue to monitor.  Electronically signed by Karine Basilio RN on 6/9/2021 at 6:59 PM

## 2021-06-09 NOTE — PROGRESS NOTES
4 Eyes Skin Assessment     NAME:  Singh Montesinos  YOB: 1940  MEDICAL RECORD NUMBER:  [id number]    The patient is being assess for  Admission    I agree that 2 RN's have performed a thorough Head to Toe Skin Assessment on the patient. ALL assessment sites listed below have been assessed. Areas assessed by both nurses:    Head, Face, Ears, Shoulders, Back, Chest, Arms, Elbows, Hands and Legs. Feet and Heels        Does the Patient have a Wound?  No noted wound(s)       Hansel Prevention initiated:  No   Wound Care Orders initiated:  NA    Pressure Injury (Stage 3,4, Unstageable, DTI, NWPT, and Complex wounds) if present place consult order under [de-identified] NA    New and Established Ostomies if present place consult order under : NA      Nurse 1 eSignature: Electronically signed by Vilma George RN on 6/9/21 at 6:05 PM EDT    **SHARE this note so that the co-signing nurse is able to place an eSignature**    Nurse 2 eSignature: Electronically signed by Francisca Bermudez RN on 6/9/21 at 7:15 PM EDT

## 2021-06-09 NOTE — PLAN OF CARE
Problem: Pain:  Goal: Pain level will decrease  Description: Pain level will decrease  Outcome: Ongoing  Note: Pt assessed for pain. Pt in pain and assessed with 0-10 pain rating scale. Pt given prescribed analgesic for pain. (See eMar) Pt satisfied with pain relief thus far. Will reassess and continue to monitor. Problem: Pain:  Goal: Control of acute pain  Description: Control of acute pain  Outcome: Ongoing  Note: Pt assessed for pain. Pt in pain and assessed with 0-10 pain rating scale. Pt given prescribed analgesic for pain. (See eMar) Pt satisfied with pain relief thus far. Will reassess and continue to monitor. Problem: Pain:  Goal: Control of chronic pain  Description: Control of chronic pain  Outcome: Ongoing  Note: Pt assessed for pain. Pt in pain and assessed with 0-10 pain rating scale. Pt given prescribed analgesic for pain. (See eMar) Pt satisfied with pain relief thus far. Will reassess and continue to monitor.

## 2021-06-10 ENCOUNTER — ANESTHESIA EVENT (OUTPATIENT)
Dept: OPERATING ROOM | Age: 81
DRG: 418 | End: 2021-06-10
Payer: MEDICARE

## 2021-06-10 ENCOUNTER — TELEPHONE (OUTPATIENT)
Dept: INTERNAL MEDICINE CLINIC | Age: 81
End: 2021-06-10

## 2021-06-10 ENCOUNTER — APPOINTMENT (OUTPATIENT)
Dept: CT IMAGING | Age: 81
DRG: 418 | End: 2021-06-10
Attending: INTERNAL MEDICINE
Payer: MEDICARE

## 2021-06-10 LAB
ANION GAP SERPL CALCULATED.3IONS-SCNC: 11 MMOL/L (ref 3–16)
BANDED NEUTROPHILS RELATIVE PERCENT: 2 % (ref 0–7)
BASOPHILS ABSOLUTE: 0 K/UL (ref 0–0.2)
BASOPHILS RELATIVE PERCENT: 0 %
BUN BLDV-MCNC: 18 MG/DL (ref 7–20)
CALCIUM SERPL-MCNC: 8.1 MG/DL (ref 8.3–10.6)
CHLORIDE BLD-SCNC: 99 MMOL/L (ref 99–110)
CO2: 23 MMOL/L (ref 21–32)
CREAT SERPL-MCNC: 1.4 MG/DL (ref 0.8–1.3)
EKG ATRIAL RATE: 75 BPM
EKG DIAGNOSIS: NORMAL
EKG P AXIS: 58 DEGREES
EKG P-R INTERVAL: 204 MS
EKG Q-T INTERVAL: 428 MS
EKG QRS DURATION: 90 MS
EKG QTC CALCULATION (BAZETT): 477 MS
EKG R AXIS: -12 DEGREES
EKG T AXIS: 25 DEGREES
EKG VENTRICULAR RATE: 75 BPM
EOSINOPHILS ABSOLUTE: 0 K/UL (ref 0–0.6)
EOSINOPHILS RELATIVE PERCENT: 0 %
GFR AFRICAN AMERICAN: 59
GFR NON-AFRICAN AMERICAN: 49
GLUCOSE BLD-MCNC: 161 MG/DL (ref 70–99)
HCT VFR BLD CALC: 37.9 % (ref 40.5–52.5)
HEMOGLOBIN: 13.1 G/DL (ref 13.5–17.5)
LYMPHOCYTES ABSOLUTE: 0.5 K/UL (ref 1–5.1)
LYMPHOCYTES RELATIVE PERCENT: 3 %
MAGNESIUM: 1.7 MG/DL (ref 1.8–2.4)
MCH RBC QN AUTO: 31.5 PG (ref 26–34)
MCHC RBC AUTO-ENTMCNC: 34.5 G/DL (ref 31–36)
MCV RBC AUTO: 91.4 FL (ref 80–100)
MONOCYTES ABSOLUTE: 1.1 K/UL (ref 0–1.3)
MONOCYTES RELATIVE PERCENT: 6 %
NEUTROPHILS ABSOLUTE: 16 K/UL (ref 1.7–7.7)
NEUTROPHILS RELATIVE PERCENT: 89 %
PDW BLD-RTO: 13.4 % (ref 12.4–15.4)
PLATELET # BLD: 90 K/UL (ref 135–450)
PMV BLD AUTO: 9.6 FL (ref 5–10.5)
POTASSIUM REFLEX MAGNESIUM: 3.5 MMOL/L (ref 3.5–5.1)
RBC # BLD: 4.15 M/UL (ref 4.2–5.9)
SLIDE REVIEW: ABNORMAL
SODIUM BLD-SCNC: 133 MMOL/L (ref 136–145)
WBC # BLD: 17.6 K/UL (ref 4–11)

## 2021-06-10 PROCEDURE — 6370000000 HC RX 637 (ALT 250 FOR IP): Performed by: INTERNAL MEDICINE

## 2021-06-10 PROCEDURE — 2580000003 HC RX 258: Performed by: INTERNAL MEDICINE

## 2021-06-10 PROCEDURE — 6360000002 HC RX W HCPCS: Performed by: INTERNAL MEDICINE

## 2021-06-10 PROCEDURE — 93010 ELECTROCARDIOGRAM REPORT: CPT | Performed by: INTERNAL MEDICINE

## 2021-06-10 PROCEDURE — 94760 N-INVAS EAR/PLS OXIMETRY 1: CPT

## 2021-06-10 PROCEDURE — 85025 COMPLETE CBC W/AUTO DIFF WBC: CPT

## 2021-06-10 PROCEDURE — 74177 CT ABD & PELVIS W/CONTRAST: CPT

## 2021-06-10 PROCEDURE — 83735 ASSAY OF MAGNESIUM: CPT

## 2021-06-10 PROCEDURE — 80048 BASIC METABOLIC PNL TOTAL CA: CPT

## 2021-06-10 PROCEDURE — APPSS180 APP SPLIT SHARED TIME > 60 MINUTES: Performed by: NURSE PRACTITIONER

## 2021-06-10 PROCEDURE — 36415 COLL VENOUS BLD VENIPUNCTURE: CPT

## 2021-06-10 PROCEDURE — 99222 1ST HOSP IP/OBS MODERATE 55: CPT | Performed by: SURGERY

## 2021-06-10 PROCEDURE — 2500000003 HC RX 250 WO HCPCS: Performed by: INTERNAL MEDICINE

## 2021-06-10 PROCEDURE — 1200000000 HC SEMI PRIVATE

## 2021-06-10 PROCEDURE — 99223 1ST HOSP IP/OBS HIGH 75: CPT | Performed by: INTERNAL MEDICINE

## 2021-06-10 PROCEDURE — 6360000004 HC RX CONTRAST MEDICATION

## 2021-06-10 RX ORDER — FUROSEMIDE 20 MG/1
10 TABLET ORAL DAILY
Status: ON HOLD | COMMUNITY
End: 2022-09-19 | Stop reason: HOSPADM

## 2021-06-10 RX ORDER — MAGNESIUM SULFATE 1 G/100ML
1000 INJECTION INTRAVENOUS ONCE
Status: COMPLETED | OUTPATIENT
Start: 2021-06-10 | End: 2021-06-10

## 2021-06-10 RX ORDER — OMEPRAZOLE 20 MG/1
20 CAPSULE, DELAYED RELEASE ORAL DAILY
COMMUNITY

## 2021-06-10 RX ORDER — MAGNESIUM SULFATE HEPTAHYDRATE 500 MG/ML
1000 INJECTION, SOLUTION INTRAMUSCULAR; INTRAVENOUS ONCE
Status: DISCONTINUED | OUTPATIENT
Start: 2021-06-10 | End: 2021-06-10

## 2021-06-10 RX ADMIN — CIPROFLOXACIN 400 MG: 2 INJECTION, SOLUTION INTRAVENOUS at 19:04

## 2021-06-10 RX ADMIN — AMIODARONE HYDROCHLORIDE 200 MG: 200 TABLET ORAL at 08:20

## 2021-06-10 RX ADMIN — METRONIDAZOLE 500 MG: 500 INJECTION, SOLUTION INTRAVENOUS at 17:55

## 2021-06-10 RX ADMIN — ATORVASTATIN CALCIUM 80 MG: 80 TABLET, FILM COATED ORAL at 20:29

## 2021-06-10 RX ADMIN — IOHEXOL 50 ML: 240 INJECTION, SOLUTION INTRATHECAL; INTRAVASCULAR; INTRAVENOUS; ORAL at 09:14

## 2021-06-10 RX ADMIN — ENOXAPARIN SODIUM 40 MG: 40 INJECTION SUBCUTANEOUS at 08:20

## 2021-06-10 RX ADMIN — CIPROFLOXACIN 400 MG: 2 INJECTION, SOLUTION INTRAVENOUS at 06:02

## 2021-06-10 RX ADMIN — CARVEDILOL 25 MG: 25 TABLET, FILM COATED ORAL at 08:20

## 2021-06-10 RX ADMIN — PANTOPRAZOLE SODIUM 40 MG: 40 TABLET, DELAYED RELEASE ORAL at 06:02

## 2021-06-10 RX ADMIN — METRONIDAZOLE 500 MG: 500 INJECTION, SOLUTION INTRAVENOUS at 02:18

## 2021-06-10 RX ADMIN — METRONIDAZOLE 500 MG: 500 INJECTION, SOLUTION INTRAVENOUS at 10:34

## 2021-06-10 RX ADMIN — CARVEDILOL 25 MG: 25 TABLET, FILM COATED ORAL at 17:55

## 2021-06-10 RX ADMIN — SPIRONOLACTONE 25 MG: 25 TABLET ORAL at 08:20

## 2021-06-10 RX ADMIN — SODIUM CHLORIDE: 9 INJECTION, SOLUTION INTRAVENOUS at 17:55

## 2021-06-10 RX ADMIN — MAGNESIUM SULFATE HEPTAHYDRATE 1000 MG: 1 INJECTION, SOLUTION INTRAVENOUS at 11:45

## 2021-06-10 ASSESSMENT — PAIN - FUNCTIONAL ASSESSMENT: PAIN_FUNCTIONAL_ASSESSMENT: PREVENTS OR INTERFERES SOME ACTIVE ACTIVITIES AND ADLS

## 2021-06-10 ASSESSMENT — PAIN DESCRIPTION - ONSET: ONSET: ON-GOING

## 2021-06-10 ASSESSMENT — PAIN DESCRIPTION - LOCATION: LOCATION: ABDOMEN

## 2021-06-10 ASSESSMENT — PAIN DESCRIPTION - ORIENTATION: ORIENTATION: MID;LOWER;RIGHT

## 2021-06-10 ASSESSMENT — PAIN SCALES - GENERAL: PAINLEVEL_OUTOF10: 3

## 2021-06-10 ASSESSMENT — PAIN DESCRIPTION - PROGRESSION: CLINICAL_PROGRESSION: GRADUALLY WORSENING

## 2021-06-10 ASSESSMENT — PAIN DESCRIPTION - PAIN TYPE: TYPE: ACUTE PAIN

## 2021-06-10 ASSESSMENT — PAIN DESCRIPTION - FREQUENCY: FREQUENCY: CONTINUOUS

## 2021-06-10 ASSESSMENT — PAIN DESCRIPTION - DESCRIPTORS: DESCRIPTORS: CRAMPING;DISCOMFORT

## 2021-06-10 NOTE — H&P
[address] [place] [address]                              HISTORY AND PHYSICAL    PATIENT NAME: Humberto Delcid                   :        1940  MED REC NO:   [id number]                          ROOM:       3101  ACCOUNT NO:   [de-identified]                           ADMIT DATE: 2021  PROVIDER:     Lisa Sharma MD    HISTORY OF PRESENT ILLNESS:  This patient is a 79-year-old male who was  brought to the hospital with onset of abdominal pain for the past 3 to 4  days. It is generalized initially on both sides. It is associated with  some nausea and vomiting and he may have slight diarrhea on Tuesday and  went three times. He did not eat anything unusual.  He was not on any  antibiotics recently. He was seen in the emergency room and his white  cell count was 12.5. His CT scan of the abdomen and pelvis were normal.  He was offered admission but he wanted to go home because his pain was  better with medications. He was sent home on Cipro and Flagyl. Apparently, two hours after getting morphine sulfate in the emergency  room, his family noted to have quivering of his lower lip and without  any other symptoms. Apparently today, it is much, much less but I was  not able to observe any quivering today. Since he went home, he had  fever of 100 a couple of times the night before and yesterday. He  continued to have abdominal pain. He had very poor appetite. He says  it is on both sides but he is showing right midabdominal area as the  area of pain and it is constant. It is not increased with cough and  deep breathing. He has no urinary symptoms. He did not have any bowel  movements since the day before.   He has no headaches, dizziness,  lightheadedness, but when he came to the office yesterday for  evaluation, he was feeling very cold and chilly and he was having  difficulty breathing even with sitting and he had some expiratory  wheezes. When seen in the offices, he was having some crackles at the  right base and a few at the left base. His O2 sats were normal, his  pulse and blood pressure were normal, temperature was normal.  But  because of the continued abdominal pain, it was felt he needed to be  admitted to the hospital for diagnosis and treatment of his condition  and he was admitted yesterday from the office. He had no abdominal  surgeries in the past I could think of. He had colonoscopies before,  even in 2020, without significant abnormalities. He has no cough, no  sputum. No chest pains. He has no CNS symptoms. REVIEW OF SYSTEMS:  Otherwise negative. PAST MEDICAL HISTORY:  Includes he has diverticulosis, hypertension,  chronic diastolic congestive heart failure, one episode of atrial  fibrillation without any recurrences. He had sleep apnea, obesity, and  hyperlipidemia. PAST SURGICAL HISTORY:  As mentioned, other than colonoscopy, he did not  have any surgeries. FAMILY HISTORY:  Positive for prostate cancer in his father. His sister  has diabetes. SOCIAL HISTORY:  He was a former smoker. He quit five years back. He  drinks alcohol maybe two standard drinks in a week. He does not use any  drugs or abuse any drugs. HOME MEDICATIONS:  Include Lasix, omeprazole, metronidazole, Cipro,  tramadol, spironolactone, carvedilol, amiodarone, Lipitor, aspirin, and  Prevacid. PHYSICAL EXAMINATION:  GENERAL:  He is alert, well oriented. VITAL SIGNS:  At least in the office yesterday, he was having some  respiratory difficulty, he was afebrile in the office. But in the  hospital, his temperature is 100, his pulse rate is 70 to 80, his blood  pressure is normal, O2 sats are 92% to 96%, he had 89% and 90% to some  extent on room air. HEENT:  His earlobes are normal.  Pupils are equal and reactive to light  and accommodation. Extraocular movements are full. Conjunctivae are  pink.   Nose and throat are clear. Oral cavity is normal.  Buccal mucosa  is normal.  Tongue is moist.  NECK:  There is no JVP, no thyromegaly, no carotid bruit, no  lymphadenopathy. HEART:  Regular rhythm without any gallop or murmur. Heart sounds are  normal.  PMI is in the fifth intercostal space. LUNGS:  At least yesterday, he had some crackles in the bases, right  more than left, but today, he has no crackles. He has no wheezing. Breath sounds are equal on both sides. He has no rubs. He is not using  intercostal muscles to breathe. He is lying comfortably flat on the  bed. ABDOMEN:  Soft but distended. Bowel sounds are present but hypoactive. He still is tender in the right mid quadrant to the lower quadrant  without any rebound or rigidity. No masses are felt. Liver and spleen  are not felt. There is no evidence of ascites. Bowel sounds are, as  mentioned, heard but not very active. EXTREMITIES:  There is no pedal edema. Pulses are very well felt. NEUROLOGIC:  I did not find any tremors or a quivering of his lips. He  is alert, oriented. Cranials are normal.  There are no focal deficits  of his extremities. LABORATORY DATA:  Revealed yesterday that his white cell count was  20,000. His CRP is 263. ProBNP is 1399. His sed rate is 99. His BUN  and creatinine are 22 and 1.3, sodium is 129, and today it is 133 after  starting him on normal saline IV. Magnesium is 1.7. Today, his white  cell count is 17,000 with left shift, his platelets are 443,235 and  90,000, subsequently. His EKG was unremarkable and in sinus rhythm. His chest x-ray is clear. He had a CT scan of the abdomen which was ordered. IMPRESSION:  This patient presented with fever and right-sided abdominal  pain, obviously suggesting an infectious etiology, cause of which is not  clear.   It is possible he could have a right-sided diverticulitis or  appendectomy, possible cholecystitis but his liver enzymes are not elevated. His lipase was normal.  He has no signs of acute abdomen or  obstruction at this point. He is still passing gas. He did not have  any urinary tract infection and I do not believe he has any lung  infection. This in a patient with history of hypertension, obesity,  paroxysmal supraventricular tachycardia, hyperlipidemia, chronic  diastolic CHF which is well compensated now, and also history of atrial  fibrillation. PLAN OF TREATMENT:  He will be admitted to the hospital.  He will be  started on IV fluids and IV antibiotics with Cipro and Flagyl. Monitor  his vitals and respiratory status. A surgical consult was requested  with Dr. Manny Floyd and a CT scan of the abdomen and pelvis was ordered. Diagnoses and treatment plans were discussed with the patient and his  wife.         Mina Hahn MD    D: 06/10/2021 10:04:56       T: 06/10/2021 14:04:56     [email or web address]  Job#: [id number]     Doc#: [id number]    CC:

## 2021-06-10 NOTE — PROGRESS NOTES
Patient in bed, awake. Checked on patient Q2H for bathroom needs, comfort measures and fall interventions. All precautions and interventions in place. Educated patient to continue using call light when needing assistance. Patient verbalizes understanding. Call light/telephone in reach. Will continue to monitor per facility protocol.  Electronically signed by Graciela Pratt RN on 6/10/2021 at 5:25 PM

## 2021-06-10 NOTE — PROGRESS NOTES
Admitting  note dictated-[id number]  1-right sided abdominal pain ,fever -suggesting an infectious etiology-possibilities are rigth sided diverticulitis ,appendicitic,cholecystitis and has no signs of uti or obstruction   2-HTN  3- OBESITY  4-PSVT  5-Hyperlipidemia  6-chronic diastolic chf and well compensated   7- H/O atrial fibrillation x 1 episode    He is feeling better than yesterday and still has abdominal pain and is on IV fluids and antibiotics  surgical consult pending  Ct scan today  Dx and rx plans discussed with patient. his wife and daughter

## 2021-06-10 NOTE — CONSULTS
tablet Take 10 mg by mouth daily   Yes Historical Provider, MD   omeprazole (PRILOSEC) 20 MG delayed release capsule Take 20 mg by mouth daily   Yes Historical Provider, MD   metroNIDAZOLE (FLAGYL) 500 MG tablet Take 1 tablet by mouth 3 times daily for 10 days 6/8/21 6/18/21 Yes Juda Hammans, MD   ciprofloxacin (CIPRO) 500 MG tablet Take 1 tablet by mouth 2 times daily for 10 days 6/8/21 6/18/21 Yes Juda Hammans, MD   traMADol (ULTRAM) 50 MG tablet Take 1 tablet by mouth every 6 hours as needed for Pain for up to 3 days. Intended supply: 3 days. Take lowest dose possible to manage pain 6/8/21 6/11/21 Yes Juda Hammans, MD   spironolactone (ALDACTONE) 25 MG tablet TAKE 1 TABLET EVERY DAY 3/31/21  Yes Sonal Estrella MD   carvedilol (COREG) 25 MG tablet TAKE 1 TABLET TWICE DAILY WITH MEALS 3/31/21  Yes Sonal Estrella MD   amiodarone (CORDARONE) 200 MG tablet TAKE 1 TABLET EVERY DAY 3/31/21  Yes Sonal Estrella MD   atorvastatin (LIPITOR) 80 MG tablet TAKE 1 TABLET EVERY DAY 3/31/21  Yes Sonal Estrella MD   aspirin EC 81 MG EC tablet Take 1 tablet by mouth daily. 2/22/13  Yes Sonal Estrella MD   ondansetron (ZOFRAN ODT) 4 MG disintegrating tablet Take 1 tablet by mouth 4 times daily as needed for Nausea or Vomiting 6/8/21   Juda Hammans, MD    Scheduled Meds:   ciprofloxacin  400 mg Intravenous Q12H    metroNIDAZOLE  500 mg Intravenous Q8H    spironolactone  25 mg Oral Daily    carvedilol  25 mg Oral BID WC    amiodarone  200 mg Oral Daily    atorvastatin  80 mg Oral Nightly    aspirin  81 mg Oral Once    pantoprazole  40 mg Oral QAM AC    sodium chloride flush  5-40 mL Intravenous 2 times per day    enoxaparin  40 mg Subcutaneous Daily     Continuous Infusions:   sodium chloride      sodium chloride 75 mL/hr at 06/09/21 1336     PRN Meds:. iopamidol, traMADol, sodium chloride flush, sodium chloride, ondansetron **OR** ondansetron, polyethylene glycol, acetaminophen **OR** acetaminophen    Allergies  has No Known Allergies. Family History  Reviewed, non contribtory  family history includes Cancer in his father; Diabetes in his sister. Social History  Reviewed, non contributory   reports that he quit smoking about 30 years ago. He has a 1.25 pack-year smoking history. He has never used smokeless tobacco. He reports current alcohol use of about 2.0 standard drinks of alcohol per week. He reports that he does not use drugs. Review of Systems:  12 point review of systems was reviewed and negative except for that listed in HPI    OBJECTIVE:   VITALS:  height is 5' 6\" (1.676 m) and weight is 232 lb 2.3 oz (105.3 kg). His oral temperature is 97.8 °F (36.6 °C). His blood pressure is 122/66 and his pulse is 81. His respiration is 14 and oxygen saturation is 90%. CONSTITUTIONAL: Alert and oriented times 3, no acute distress and cooperative to examination with proper mood and affect. SKIN: Skin color, texture, turgor normal. No rashes or lesions. LYMPH: no cervical nodes, no inguinal nodes  HEENT: Head is normocephalic, atraumatic. EOMI, PERRLA. NECK: Supple, symmetrical, trachea midline, no adenopathy, thyroid symmetric, not enlarged and no tenderness, skin normal.  CHEST/LUNGS: chest symmetric with normal A/P diameter, normal respiratory rate and rhythm, lungs clear to auscultation without wheezes, rales or rhonchi. No accessory muscle use. CARDIOVASCULAR: Heart sounds are normal.  Regular rate and rhythm without murmur, gallop or rub. ABDOMEN: Soft, obese, generalized tenderness with focal tenderness right upper quadrant without peritonitis. RECTAL: deferred, not clinically indicated  NEUROLOGIC: There are no focalizing motor or sensory deficits. CN II-XII are grossly intact. EXTREMITIES: no cyanosis, no clubbing and no edema.     LABS:     Recent Labs     06/08/21  1137 06/09/21  1826 06/09/21  2231 06/10/21  0454   WBC 12.8* 20.2*  --  17.6*   HGB 14.8 13.5  -- 13.1*   HCT 43.9 38.7*  --  37.9*    101*  --  90*   * 129*  --  133*   K 4.5 3.9  --  3.5    96*  --  99   CO2 25 22  --  23   BUN 30* 22*  --  18   CREATININE 1.2 1.3  --  1.4*   MG  --   --   --  1.70*   CALCIUM 8.8 8.0*  --  8.1*   AST 12* 13*  --   --    ALT 12 10  --   --    BILITOT 0.5 1.4*  --   --    NITRU Negative  --  Negative  --    COLORU YELLOW  --  YELLOW  --      Recent Labs     06/09/21  1826   ALKPHOS 44   ALT 10   AST 13*   BILITOT 1.4*   LABALBU 3.8   LIPASE 17.0         RADIOLOGY:   I have personally reviewed the following films:  CT ABDOMEN PELVIS W IV CONTRAST Additional Contrast? Radiologist Recommendation   Final Result   1. Acute cholecystitis. 2. Diverticulosis. XR CHEST (2 VW)   Final Result   Clear lungs. No acute abnormality. No significant change from the prior   study. Thank you for the interesting evaluation. Further recommendations to follow. EDUCATION  Patient educated about the following as pertinent to medical/surgical problems: Disease Process, Medications, Smoking Cessation, Oxygenation, Incentive Spirometry and Deep Breath and Cough, Diabetes, Hyperlipidemia, Smoking Cessation, Nutrition, Exercise and Hypertension    Electronically signed by ARACELY Ariza CNP on 6/10/2021 at 1:20 PM    The note may have been completed using Dragon voice recognition transcription. Every effort was made to ensure accuracy; however, inadvertent transcription errors may be present despite my best efforts to edit errors.     Attending    As per note above by Mario Oquendo  Patient was personally seen and examined by me today  Chart, labs and imaging reviewed    A/P  Acute cholecystitis   Clear evolution on CT scans x 2 done this week   Feels OK overall   On antibiotics   Offered cholecystectomy and they want to consider    Tentatively planned for Friday the 11th    Electronically signed by Maurisio Marti MD on 6/10/2021 at 2:28 PM

## 2021-06-10 NOTE — PLAN OF CARE
Problem: Pain:  Goal: Pain level will decrease  Description: Pain level will decrease  6/10/2021 1129 by Jihan Iraheta RN  Outcome: Ongoing  Note: Pain /discomfort being managed with PRN analgesics per MD orders. Patient able to express presence and absence of pain and rate pain appropriately using numerical scale. Problem: Pain:  Goal: Control of acute pain  Description: Control of acute pain  6/10/2021 1129 by Jihan Iraheta RN  Outcome: Ongoing  Note: Patient educated on acute pain. Taught patient to use call light to ask for pain medication. PRN pain medication given for acute pain. Will continue to monitor pain per unit protocol. Problem: Pain:  Goal: Control of chronic pain  Description: Control of chronic pain  6/10/2021 1129 by Jihan Iraheta RN  Outcome: Ongoing  Note: Patient educated on Chronic pain. Taught patient to use call light to ask for pain medication. PRN pain medication given for pain. Will continue to monitor pain per unit protocol. Problem: Falls - Risk of:  Goal: Will remain free from falls  Description: Will remain free from falls  Outcome: Ongoing  Note: Fall risk assessment completed . Fall precautions in place, chair alarm on, side rails 2/4 up, call light in reach, educated pt on calling for assistance when needed, room clear of clutter. Pt verbalized understanding. Problem: Falls - Risk of:  Goal: Absence of physical injury  Description: Absence of physical injury  Outcome: Ongoing  Note: Pt is free of injury. No injury noted. Fall precautions in place. Call light within reach. Will monitor.

## 2021-06-10 NOTE — PLAN OF CARE
Problem: Pain:  Goal: Pain level will decrease  Description: Pain level will decrease  6/9/2021 2306 by Francisca Bermudez RN  Outcome: Ongoing  6/9/2021 1834 by Vilma George RN  Outcome: Ongoing  Note: Pt assessed for pain. Pt in pain and assessed with 0-10 pain rating scale. Pt given prescribed analgesic for pain. (See eMar) Pt satisfied with pain relief thus far. Will reassess and continue to monitor. Goal: Control of acute pain  Description: Control of acute pain  6/9/2021 2306 by Francisca Bermudez RN  Outcome: Ongoing  6/9/2021 1834 by Vilma George RN  Outcome: Ongoing  Note: Pt assessed for pain. Pt in pain and assessed with 0-10 pain rating scale. Pt given prescribed analgesic for pain. (See eMar) Pt satisfied with pain relief thus far. Will reassess and continue to monitor. Goal: Control of chronic pain  Description: Control of chronic pain  6/9/2021 2306 by Francisca Bermudez RN  Outcome: Ongoing  6/9/2021 1834 by Vilma George RN  Outcome: Ongoing  Note: Pt assessed for pain. Pt in pain and assessed with 0-10 pain rating scale. Pt given prescribed analgesic for pain. (See eMar) Pt satisfied with pain relief thus far. Will reassess and continue to monitor.

## 2021-06-10 NOTE — PROGRESS NOTES
Patient alert and oriented times four. Pt assessed for pain. Pt denies any pain at this time. Will continue to monitor pt and assess for pain throughout rest of shift. Patient tolerated oral medications well. This RN attempted iv access no success. Ashley Flick RN to attempt next. Will continue to monitor and assess. Fall risk assessment completed. Fall precautions in place. Call light within reach. Pt educated on calling for assistance before getting up. Walkway free of clutter. Will continue to monitor.    Electronically signed by Avelino Oppenheim, RN on 6/9/2021 at 9:03 PM

## 2021-06-10 NOTE — TELEPHONE ENCOUNTER
----- Message from [facility] ALEX VILLARREAL sent at 6/9/2021  5:48 PM EDT -----  Subject: Message to Provider    QUESTIONS  Information for Provider? He is currently in [place] [address] on [place]. She just wanted to let you know so he can get his orders released. ---------------------------------------------------------------------------  --------------  Joaquim Fent INFO  What is the best way for the office to contact you? OK to leave message on   voicemail  Preferred Call Back Phone Number? [phone]  ---------------------------------------------------------------------------  --------------  SCRIPT ANSWERS  Relationship to Patient? Third Party  Representative Name?  Lakeshia Jurado

## 2021-06-11 ENCOUNTER — APPOINTMENT (OUTPATIENT)
Dept: GENERAL RADIOLOGY | Age: 81
DRG: 418 | End: 2021-06-11
Attending: INTERNAL MEDICINE
Payer: MEDICARE

## 2021-06-11 ENCOUNTER — ANESTHESIA (OUTPATIENT)
Dept: OPERATING ROOM | Age: 81
DRG: 418 | End: 2021-06-11
Payer: MEDICARE

## 2021-06-11 VITALS
OXYGEN SATURATION: 98 % | RESPIRATION RATE: 26 BRPM | TEMPERATURE: 98.4 F | DIASTOLIC BLOOD PRESSURE: 58 MMHG | SYSTOLIC BLOOD PRESSURE: 116 MMHG

## 2021-06-11 PROBLEM — D69.1 THROMBOCYTE DISORDER (HCC): Status: ACTIVE | Noted: 2021-06-11

## 2021-06-11 PROBLEM — D69.6 THROMBOCYTOPENIA (HCC): Status: ACTIVE | Noted: 2021-06-11

## 2021-06-11 LAB
ALBUMIN SERPL-MCNC: 2.9 G/DL (ref 3.4–5)
ALP BLD-CCNC: 47 U/L (ref 40–129)
ALT SERPL-CCNC: 9 U/L (ref 10–40)
ANION GAP SERPL CALCULATED.3IONS-SCNC: 12 MMOL/L (ref 3–16)
AST SERPL-CCNC: 11 U/L (ref 15–37)
BASOPHILS ABSOLUTE: 0 K/UL (ref 0–0.2)
BASOPHILS RELATIVE PERCENT: 0.1 %
BILIRUB SERPL-MCNC: 0.7 MG/DL (ref 0–1)
BILIRUBIN DIRECT: <0.2 MG/DL (ref 0–0.3)
BILIRUBIN, INDIRECT: ABNORMAL MG/DL (ref 0–1)
BUN BLDV-MCNC: 14 MG/DL (ref 7–20)
CALCIUM SERPL-MCNC: 7.7 MG/DL (ref 8.3–10.6)
CHLORIDE BLD-SCNC: 100 MMOL/L (ref 99–110)
CO2: 23 MMOL/L (ref 21–32)
CREAT SERPL-MCNC: 1.3 MG/DL (ref 0.8–1.3)
EOSINOPHILS ABSOLUTE: 0 K/UL (ref 0–0.6)
EOSINOPHILS RELATIVE PERCENT: 0.1 %
GFR AFRICAN AMERICAN: >60
GFR NON-AFRICAN AMERICAN: 53
GLUCOSE BLD-MCNC: 157 MG/DL (ref 70–99)
HCT VFR BLD CALC: 35.9 % (ref 40.5–52.5)
HEMOGLOBIN: 12.3 G/DL (ref 13.5–17.5)
HEPARIN INDUCED PLATELET ANTIBODY: NEGATIVE
LYMPHOCYTES ABSOLUTE: 0.6 K/UL (ref 1–5.1)
LYMPHOCYTES RELATIVE PERCENT: 4.6 %
MAGNESIUM: 1.9 MG/DL (ref 1.8–2.4)
MCH RBC QN AUTO: 31.4 PG (ref 26–34)
MCHC RBC AUTO-ENTMCNC: 34.3 G/DL (ref 31–36)
MCV RBC AUTO: 91.5 FL (ref 80–100)
MONOCYTES ABSOLUTE: 1.3 K/UL (ref 0–1.3)
MONOCYTES RELATIVE PERCENT: 9.2 %
NEUTROPHILS ABSOLUTE: 11.8 K/UL (ref 1.7–7.7)
NEUTROPHILS RELATIVE PERCENT: 86 %
PDW BLD-RTO: 13.2 % (ref 12.4–15.4)
PLATELET # BLD: 70 K/UL (ref 135–450)
PMV BLD AUTO: 9.7 FL (ref 5–10.5)
POTASSIUM SERPL-SCNC: 3.5 MMOL/L (ref 3.5–5.1)
RBC # BLD: 3.92 M/UL (ref 4.2–5.9)
SARS-COV-2, NAAT: NOT DETECTED
SODIUM BLD-SCNC: 135 MMOL/L (ref 136–145)
TOTAL PROTEIN: 5.8 G/DL (ref 6.4–8.2)
WBC # BLD: 13.7 K/UL (ref 4–11)

## 2021-06-11 PROCEDURE — 85025 COMPLETE CBC W/AUTO DIFF WBC: CPT

## 2021-06-11 PROCEDURE — 2580000003 HC RX 258: Performed by: ANESTHESIOLOGY

## 2021-06-11 PROCEDURE — 6360000002 HC RX W HCPCS: Performed by: INTERNAL MEDICINE

## 2021-06-11 PROCEDURE — 80076 HEPATIC FUNCTION PANEL: CPT

## 2021-06-11 PROCEDURE — 2500000003 HC RX 250 WO HCPCS

## 2021-06-11 PROCEDURE — 6360000004 HC RX CONTRAST MEDICATION: Performed by: SURGERY

## 2021-06-11 PROCEDURE — 2580000003 HC RX 258: Performed by: SURGERY

## 2021-06-11 PROCEDURE — 1200000000 HC SEMI PRIVATE

## 2021-06-11 PROCEDURE — 3600000014 HC SURGERY LEVEL 4 ADDTL 15MIN: Performed by: SURGERY

## 2021-06-11 PROCEDURE — 2709999900 HC NON-CHARGEABLE SUPPLY: Performed by: SURGERY

## 2021-06-11 PROCEDURE — 2500000003 HC RX 250 WO HCPCS: Performed by: INTERNAL MEDICINE

## 2021-06-11 PROCEDURE — 83735 ASSAY OF MAGNESIUM: CPT

## 2021-06-11 PROCEDURE — 0FT44ZZ RESECTION OF GALLBLADDER, PERCUTANEOUS ENDOSCOPIC APPROACH: ICD-10-PCS | Performed by: SURGERY

## 2021-06-11 PROCEDURE — 2500000003 HC RX 250 WO HCPCS: Performed by: SURGERY

## 2021-06-11 PROCEDURE — 86022 PLATELET ANTIBODIES: CPT

## 2021-06-11 PROCEDURE — 80048 BASIC METABOLIC PNL TOTAL CA: CPT

## 2021-06-11 PROCEDURE — 3600000004 HC SURGERY LEVEL 4 BASE: Performed by: SURGERY

## 2021-06-11 PROCEDURE — 7100000000 HC PACU RECOVERY - FIRST 15 MIN: Performed by: SURGERY

## 2021-06-11 PROCEDURE — 47563 LAPARO CHOLECYSTECTOMY/GRAPH: CPT | Performed by: SURGERY

## 2021-06-11 PROCEDURE — 3700000000 HC ANESTHESIA ATTENDED CARE: Performed by: SURGERY

## 2021-06-11 PROCEDURE — 99233 SBSQ HOSP IP/OBS HIGH 50: CPT | Performed by: INTERNAL MEDICINE

## 2021-06-11 PROCEDURE — 88304 TISSUE EXAM BY PATHOLOGIST: CPT

## 2021-06-11 PROCEDURE — 36415 COLL VENOUS BLD VENIPUNCTURE: CPT

## 2021-06-11 PROCEDURE — 2720000010 HC SURG SUPPLY STERILE: Performed by: SURGERY

## 2021-06-11 PROCEDURE — 87635 SARS-COV-2 COVID-19 AMP PRB: CPT

## 2021-06-11 PROCEDURE — BF121ZZ FLUOROSCOPY OF GALLBLADDER USING LOW OSMOLAR CONTRAST: ICD-10-PCS | Performed by: SURGERY

## 2021-06-11 PROCEDURE — 74300 X-RAY BILE DUCTS/PANCREAS: CPT

## 2021-06-11 PROCEDURE — 6360000002 HC RX W HCPCS

## 2021-06-11 PROCEDURE — 6370000000 HC RX 637 (ALT 250 FOR IP): Performed by: INTERNAL MEDICINE

## 2021-06-11 PROCEDURE — 3700000001 HC ADD 15 MINUTES (ANESTHESIA): Performed by: SURGERY

## 2021-06-11 PROCEDURE — 2580000003 HC RX 258: Performed by: INTERNAL MEDICINE

## 2021-06-11 PROCEDURE — 7100000001 HC PACU RECOVERY - ADDTL 15 MIN: Performed by: SURGERY

## 2021-06-11 RX ORDER — ONDANSETRON 2 MG/ML
4 INJECTION INTRAMUSCULAR; INTRAVENOUS
Status: DISCONTINUED | OUTPATIENT
Start: 2021-06-11 | End: 2021-06-11 | Stop reason: HOSPADM

## 2021-06-11 RX ORDER — SUCCINYLCHOLINE/SOD CL,ISO/PF 200MG/10ML
SYRINGE (ML) INTRAVENOUS PRN
Status: DISCONTINUED | OUTPATIENT
Start: 2021-06-11 | End: 2021-06-11 | Stop reason: SDUPTHER

## 2021-06-11 RX ORDER — OXYCODONE HYDROCHLORIDE AND ACETAMINOPHEN 5; 325 MG/1; MG/1
2 TABLET ORAL PRN
Status: DISCONTINUED | OUTPATIENT
Start: 2021-06-11 | End: 2021-06-11 | Stop reason: HOSPADM

## 2021-06-11 RX ORDER — DEXAMETHASONE SODIUM PHOSPHATE 4 MG/ML
INJECTION, SOLUTION INTRA-ARTICULAR; INTRALESIONAL; INTRAMUSCULAR; INTRAVENOUS; SOFT TISSUE PRN
Status: DISCONTINUED | OUTPATIENT
Start: 2021-06-11 | End: 2021-06-11 | Stop reason: SDUPTHER

## 2021-06-11 RX ORDER — FENTANYL CITRATE 50 UG/ML
25 INJECTION, SOLUTION INTRAMUSCULAR; INTRAVENOUS EVERY 5 MIN PRN
Status: DISCONTINUED | OUTPATIENT
Start: 2021-06-11 | End: 2021-06-11 | Stop reason: HOSPADM

## 2021-06-11 RX ORDER — SODIUM CHLORIDE 0.9 % (FLUSH) 0.9 %
10 SYRINGE (ML) INJECTION EVERY 12 HOURS SCHEDULED
Status: DISCONTINUED | OUTPATIENT
Start: 2021-06-11 | End: 2021-06-11 | Stop reason: HOSPADM

## 2021-06-11 RX ORDER — BUPIVACAINE HYDROCHLORIDE 5 MG/ML
INJECTION, SOLUTION EPIDURAL; INTRACAUDAL
Status: COMPLETED | OUTPATIENT
Start: 2021-06-11 | End: 2021-06-11

## 2021-06-11 RX ORDER — OXYCODONE HYDROCHLORIDE AND ACETAMINOPHEN 5; 325 MG/1; MG/1
1 TABLET ORAL PRN
Status: DISCONTINUED | OUTPATIENT
Start: 2021-06-11 | End: 2021-06-11 | Stop reason: HOSPADM

## 2021-06-11 RX ORDER — MORPHINE SULFATE 2 MG/ML
2 INJECTION, SOLUTION INTRAMUSCULAR; INTRAVENOUS
Status: DISCONTINUED | OUTPATIENT
Start: 2021-06-11 | End: 2021-06-14 | Stop reason: HOSPADM

## 2021-06-11 RX ORDER — SODIUM CHLORIDE 9 MG/ML
INJECTION, SOLUTION INTRAVENOUS CONTINUOUS
Status: DISCONTINUED | OUTPATIENT
Start: 2021-06-11 | End: 2021-06-12

## 2021-06-11 RX ORDER — SODIUM CHLORIDE 0.9 % (FLUSH) 0.9 %
10 SYRINGE (ML) INJECTION PRN
Status: DISCONTINUED | OUTPATIENT
Start: 2021-06-11 | End: 2021-06-11 | Stop reason: HOSPADM

## 2021-06-11 RX ORDER — FENTANYL CITRATE 50 UG/ML
INJECTION, SOLUTION INTRAMUSCULAR; INTRAVENOUS PRN
Status: DISCONTINUED | OUTPATIENT
Start: 2021-06-11 | End: 2021-06-11 | Stop reason: SDUPTHER

## 2021-06-11 RX ORDER — MAGNESIUM HYDROXIDE 1200 MG/15ML
LIQUID ORAL CONTINUOUS PRN
Status: COMPLETED | OUTPATIENT
Start: 2021-06-11 | End: 2021-06-11

## 2021-06-11 RX ORDER — ONDANSETRON 2 MG/ML
INJECTION INTRAMUSCULAR; INTRAVENOUS PRN
Status: DISCONTINUED | OUTPATIENT
Start: 2021-06-11 | End: 2021-06-11 | Stop reason: SDUPTHER

## 2021-06-11 RX ORDER — PROPOFOL 10 MG/ML
INJECTION, EMULSION INTRAVENOUS PRN
Status: DISCONTINUED | OUTPATIENT
Start: 2021-06-11 | End: 2021-06-11 | Stop reason: SDUPTHER

## 2021-06-11 RX ORDER — MORPHINE SULFATE 4 MG/ML
4 INJECTION, SOLUTION INTRAMUSCULAR; INTRAVENOUS
Status: DISCONTINUED | OUTPATIENT
Start: 2021-06-11 | End: 2021-06-14 | Stop reason: HOSPADM

## 2021-06-11 RX ORDER — SODIUM CHLORIDE 9 MG/ML
25 INJECTION, SOLUTION INTRAVENOUS PRN
Status: DISCONTINUED | OUTPATIENT
Start: 2021-06-11 | End: 2021-06-11 | Stop reason: HOSPADM

## 2021-06-11 RX ORDER — LIDOCAINE HYDROCHLORIDE 20 MG/ML
INJECTION, SOLUTION EPIDURAL; INFILTRATION; INTRACAUDAL; PERINEURAL PRN
Status: DISCONTINUED | OUTPATIENT
Start: 2021-06-11 | End: 2021-06-11 | Stop reason: SDUPTHER

## 2021-06-11 RX ORDER — ROCURONIUM BROMIDE 10 MG/ML
INJECTION, SOLUTION INTRAVENOUS PRN
Status: DISCONTINUED | OUTPATIENT
Start: 2021-06-11 | End: 2021-06-11 | Stop reason: SDUPTHER

## 2021-06-11 RX ADMIN — SPIRONOLACTONE 25 MG: 25 TABLET ORAL at 07:33

## 2021-06-11 RX ADMIN — SUGAMMADEX 200 MG: 100 INJECTION, SOLUTION INTRAVENOUS at 12:51

## 2021-06-11 RX ADMIN — LIDOCAINE HYDROCHLORIDE 100 MG: 20 INJECTION, SOLUTION EPIDURAL; INFILTRATION; INTRACAUDAL; PERINEURAL at 11:49

## 2021-06-11 RX ADMIN — DEXAMETHASONE SODIUM PHOSPHATE 8 MG: 4 INJECTION, SOLUTION INTRAMUSCULAR; INTRAVENOUS at 11:55

## 2021-06-11 RX ADMIN — ONDANSETRON 4 MG: 2 INJECTION INTRAMUSCULAR; INTRAVENOUS at 12:42

## 2021-06-11 RX ADMIN — ROCURONIUM BROMIDE 10 MG: 10 INJECTION INTRAVENOUS at 12:37

## 2021-06-11 RX ADMIN — ROCURONIUM BROMIDE 30 MG: 10 INJECTION INTRAVENOUS at 11:55

## 2021-06-11 RX ADMIN — METRONIDAZOLE 500 MG: 500 INJECTION, SOLUTION INTRAVENOUS at 17:13

## 2021-06-11 RX ADMIN — Medication 140 MG: at 11:49

## 2021-06-11 RX ADMIN — AMIODARONE HYDROCHLORIDE 200 MG: 200 TABLET ORAL at 07:33

## 2021-06-11 RX ADMIN — CIPROFLOXACIN 400 MG: 2 INJECTION, SOLUTION INTRAVENOUS at 18:19

## 2021-06-11 RX ADMIN — FENTANYL CITRATE 25 MCG: 50 INJECTION INTRAMUSCULAR; INTRAVENOUS at 12:44

## 2021-06-11 RX ADMIN — SODIUM CHLORIDE: 9 INJECTION, SOLUTION INTRAVENOUS at 12:00

## 2021-06-11 RX ADMIN — METRONIDAZOLE 500 MG: 500 INJECTION, SOLUTION INTRAVENOUS at 02:58

## 2021-06-11 RX ADMIN — Medication 10 ML: at 20:30

## 2021-06-11 RX ADMIN — CIPROFLOXACIN 400 MG: 2 INJECTION, SOLUTION INTRAVENOUS at 05:49

## 2021-06-11 RX ADMIN — FENTANYL CITRATE 25 MCG: 50 INJECTION INTRAMUSCULAR; INTRAVENOUS at 12:54

## 2021-06-11 RX ADMIN — CARVEDILOL 25 MG: 25 TABLET, FILM COATED ORAL at 07:33

## 2021-06-11 RX ADMIN — SODIUM CHLORIDE: 9 INJECTION, SOLUTION INTRAVENOUS at 11:35

## 2021-06-11 RX ADMIN — METRONIDAZOLE 500 MG: 500 INJECTION, SOLUTION INTRAVENOUS at 11:49

## 2021-06-11 RX ADMIN — PROPOFOL 100 MG: 10 INJECTION, EMULSION INTRAVENOUS at 11:49

## 2021-06-11 RX ADMIN — TRAMADOL HYDROCHLORIDE 50 MG: 50 TABLET, FILM COATED ORAL at 14:50

## 2021-06-11 RX ADMIN — METRONIDAZOLE 500 MG: 500 INJECTION, SOLUTION INTRAVENOUS at 10:54

## 2021-06-11 RX ADMIN — FENTANYL CITRATE 50 MCG: 50 INJECTION INTRAMUSCULAR; INTRAVENOUS at 11:49

## 2021-06-11 RX ADMIN — ATORVASTATIN CALCIUM 80 MG: 80 TABLET, FILM COATED ORAL at 20:26

## 2021-06-11 ASSESSMENT — PAIN DESCRIPTION - PAIN TYPE
TYPE: SURGICAL PAIN

## 2021-06-11 ASSESSMENT — PAIN DESCRIPTION - ONSET
ONSET: ON-GOING
ONSET: PROGRESSIVE
ONSET: PROGRESSIVE
ONSET: ON-GOING

## 2021-06-11 ASSESSMENT — PULMONARY FUNCTION TESTS
PIF_VALUE: 0
PIF_VALUE: 20
PIF_VALUE: 21
PIF_VALUE: 23
PIF_VALUE: 20
PIF_VALUE: 20
PIF_VALUE: 30
PIF_VALUE: 26
PIF_VALUE: 27
PIF_VALUE: 28
PIF_VALUE: 0
PIF_VALUE: 28
PIF_VALUE: 27
PIF_VALUE: 19
PIF_VALUE: 17
PIF_VALUE: 26
PIF_VALUE: 24
PIF_VALUE: 17
PIF_VALUE: 17
PIF_VALUE: 3
PIF_VALUE: 26
PIF_VALUE: 15
PIF_VALUE: 26
PIF_VALUE: 21
PIF_VALUE: 20
PIF_VALUE: 28
PIF_VALUE: 6
PIF_VALUE: 27
PIF_VALUE: 1
PIF_VALUE: 20
PIF_VALUE: 28
PIF_VALUE: 1
PIF_VALUE: 17
PIF_VALUE: 26
PIF_VALUE: 21
PIF_VALUE: 21
PIF_VALUE: 7
PIF_VALUE: 21
PIF_VALUE: 26
PIF_VALUE: 19
PIF_VALUE: 20
PIF_VALUE: 26
PIF_VALUE: 30
PIF_VALUE: 0
PIF_VALUE: 27
PIF_VALUE: 28
PIF_VALUE: 26
PIF_VALUE: 19
PIF_VALUE: 26
PIF_VALUE: 1
PIF_VALUE: 13
PIF_VALUE: 21
PIF_VALUE: 19
PIF_VALUE: 27
PIF_VALUE: 25
PIF_VALUE: 20
PIF_VALUE: 20
PIF_VALUE: 28
PIF_VALUE: 29
PIF_VALUE: 25
PIF_VALUE: 25
PIF_VALUE: 7
PIF_VALUE: 20
PIF_VALUE: 29
PIF_VALUE: 27
PIF_VALUE: 26
PIF_VALUE: 20
PIF_VALUE: 21
PIF_VALUE: 5
PIF_VALUE: 0
PIF_VALUE: 17
PIF_VALUE: 5
PIF_VALUE: 29
PIF_VALUE: 28
PIF_VALUE: 28
PIF_VALUE: 21
PIF_VALUE: 16
PIF_VALUE: 28
PIF_VALUE: 28
PIF_VALUE: 17
PIF_VALUE: 17
PIF_VALUE: 27
PIF_VALUE: 26
PIF_VALUE: 17
PIF_VALUE: 6
PIF_VALUE: 21

## 2021-06-11 ASSESSMENT — PAIN DESCRIPTION - PROGRESSION
CLINICAL_PROGRESSION: NOT CHANGED

## 2021-06-11 ASSESSMENT — PAIN SCALES - GENERAL
PAINLEVEL_OUTOF10: 7
PAINLEVEL_OUTOF10: 0
PAINLEVEL_OUTOF10: 5
PAINLEVEL_OUTOF10: 3

## 2021-06-11 ASSESSMENT — PAIN - FUNCTIONAL ASSESSMENT
PAIN_FUNCTIONAL_ASSESSMENT: 0-10
PAIN_FUNCTIONAL_ASSESSMENT: PREVENTS OR INTERFERES WITH ALL ACTIVE AND SOME PASSIVE ACTIVITIES
PAIN_FUNCTIONAL_ASSESSMENT: PREVENTS OR INTERFERES SOME ACTIVE ACTIVITIES AND ADLS
PAIN_FUNCTIONAL_ASSESSMENT: ACTIVITIES ARE NOT PREVENTED
PAIN_FUNCTIONAL_ASSESSMENT: PREVENTS OR INTERFERES SOME ACTIVE ACTIVITIES AND ADLS

## 2021-06-11 ASSESSMENT — PAIN DESCRIPTION - DESCRIPTORS
DESCRIPTORS: DISCOMFORT

## 2021-06-11 ASSESSMENT — ENCOUNTER SYMPTOMS
TROUBLE SWALLOWING: 0
COUGH: 0
CHEST TIGHTNESS: 0
SHORTNESS OF BREATH: 0
NAUSEA: 0
VOMITING: 0
SORE THROAT: 0
WHEEZING: 0
SHORTNESS OF BREATH: 0
ABDOMINAL PAIN: 1

## 2021-06-11 ASSESSMENT — PAIN DESCRIPTION - LOCATION
LOCATION: ABDOMEN

## 2021-06-11 ASSESSMENT — PAIN DESCRIPTION - FREQUENCY
FREQUENCY: CONTINUOUS
FREQUENCY: INTERMITTENT

## 2021-06-11 ASSESSMENT — PAIN DESCRIPTION - ORIENTATION
ORIENTATION: RIGHT;LEFT
ORIENTATION: RIGHT;LEFT;MID
ORIENTATION: RIGHT;LEFT

## 2021-06-11 ASSESSMENT — LIFESTYLE VARIABLES: SMOKING_STATUS: 0

## 2021-06-11 NOTE — H&P
Preoperative History and Physical Update    H&P from 6/10/2021 was reviewed    Thrombocytopenia noted. Count of 70 is safe for surgical intervention  monitor    PE  Alert and oriented  Tender RUQ    A/P  Acute cholecystitis  For Laparoscopic Cholecystectomy with Cholangiogram    The risks, benefits and alternatives to the planned procedure were discussed. Patient expressed an understanding and is willing to proceed.     Electronically signed by Alfreda Rivera MD on 6/11/2021 at 11:28 AM

## 2021-06-11 NOTE — CONSULTS
[address] [place] [address]                                  CONSULTATION    PATIENT NAME: Rosio Fields                   :        1940  MED REC NO:   [id number]                          ROOM:       3101  ACCOUNT NO:   [de-identified]                           ADMIT DATE: 2021  PROVIDER:     Brent Matias MD    HEMATOLOGY CONSULTATION    CONSULT DATE:  2021    REASON FOR CONSULTATION:  Thrombocytopenia. CONSULTING PROVIDER:  Sunny Brandt MD    HISTORY OF PRESENT ILLNESS:  The patient is a 72-year-old gentleman who  presented to the hospital with a three to four-day history of  progressive abdominal pain as well as some nausea and vomiting and  slight diarrhea. His white count in the emergency room was 12.5. He  had a CT of the abdomen and pelvis done yesterday that showed signs of  acute cholecystitis. He underwent a laparoscopic cholecystectomy today. His platelets did drop today down to 70. Yesterday, they were 90 and  the day before that, they were 101. His platelet count is typically  normal.  He was on Lovenox. His HIT screen is negative. He is now  postop from his laparoscopic cholecystectomy. He is having some  abdominal pain. He denies any shortness of breath or chest pain or  bleeding. PAST MEDICAL HISTORY:  1. Diverticulosis. 2.  Hypertension. 3.  Diastolic congestive heart failure. 4.  Paroxysmal atrial fibrillation. 5.  Sleep apnea. 6.  Obesity. 7.  Hyperlipidemia. PAST SURGICAL HISTORY:  None. ALLERGIES:  He has no known drug allergies. MEDICINES:  Amiodarone 200 mg p.o. daily, aspirin 81 mg daily, Lipitor  80 mg p.o. daily, Coreg 25 mg p.o. b.i.d., Cipro 400 mg IV q.12 hours,  Flagyl 500 mg IV q.8 hours, Protonix 40 mg p.o. daily, Aldactone 25 mg  p.o. daily. SOCIAL HISTORY:  He is . He does not smoke and drinks  occasionally. He is retired. infection as well as medication (i.e. Flagyl). I will  transfuse to keep the platelets greater than 50. I would also transfuse  if he has any bleeding overnight. I will check his B12 and folate  deficiency and thyroid disease. I will check an SPEP to rule out  myeloma. He should follow up with me in one to two weeks after  discharge to document stability in his counts. I doubt that he has ITP  or DIC. I will screen for DIC. He does not have hepatosplenomegaly on  examination. Thank you for the consultation. We will follow closely.         Yara Ibrahim MD    D: 06/11/2021 15:46:25       T: 06/11/2021 16:53:12     [email or web address]  Job#: [id number]     Doc#: [id number]    CC:  Gabino Bosworth, MD Leota Samples, MD

## 2021-06-11 NOTE — PROGRESS NOTES
Pt resting in bed. A&Ox4. No complaints overnight. IVF running at 75 ml/hr. Consent signed and in chart for surgery later today. Rapid covid done. Pt NPO since midnight. Fall precautions in place, call light and bedside table within reach.

## 2021-06-11 NOTE — PROGRESS NOTES
Pt back from surgery at this time. Alert and oriented but slightly drowsy. Asking for some water. Diet order released. Dietary getting order. Complaining of slight pain in the abdomen but states \"it feels like it did before\". Aware that he is able to have pain medication, pt will call out If pain medication is needed. Wife is at bedside.    Electronically signed by Susan Portillo RN on 6/11/2021 at 2:00 PM

## 2021-06-11 NOTE — ANESTHESIA PRE PROCEDURE
Department of Anesthesiology  Preprocedure Note       Name:  Edgar Marti   Age:  68 y.o.  :  1940                                          MRN:  [id number]         Date:  2021      Surgeon: Baron Mcclure):  Elana Leyden, MD    Procedure: Procedure(s):  LAPAROSCOPIC CHOLECYSTECTOMY WITH CHOLANGIOGRAM, POSSIBLE OPEN    Medications prior to admission:   Prior to Admission medications    Medication Sig Start Date End Date Taking? Authorizing Provider   furosemide (LASIX) 20 MG tablet Take 10 mg by mouth daily   Yes Historical Provider, MD   omeprazole (PRILOSEC) 20 MG delayed release capsule Take 20 mg by mouth daily   Yes Historical Provider, MD   metroNIDAZOLE (FLAGYL) 500 MG tablet Take 1 tablet by mouth 3 times daily for 10 days 21 Yes Jeremiah Meadows MD   ciprofloxacin (CIPRO) 500 MG tablet Take 1 tablet by mouth 2 times daily for 10 days 21 Yes Jeremiah Meadows MD   traMADol (ULTRAM) 50 MG tablet Take 1 tablet by mouth every 6 hours as needed for Pain for up to 3 days. Intended supply: 3 days. Take lowest dose possible to manage pain 21 Yes Jeremiah Meadows MD   spironolactone (ALDACTONE) 25 MG tablet TAKE 1 TABLET EVERY DAY 3/31/21  Yes Thelma Virk MD   carvedilol (COREG) 25 MG tablet TAKE 1 TABLET TWICE DAILY WITH MEALS 3/31/21  Yes Thelma Virk MD   amiodarone (CORDARONE) 200 MG tablet TAKE 1 TABLET EVERY DAY 3/31/21  Yes Thelma Virk MD   atorvastatin (LIPITOR) 80 MG tablet TAKE 1 TABLET EVERY DAY 3/31/21  Yes Thelma Virk MD   aspirin EC 81 MG EC tablet Take 1 tablet by mouth daily.  13  Yes Thelma Virk MD   ondansetron (ZOFRAN ODT) 4 MG disintegrating tablet Take 1 tablet by mouth 4 times daily as needed for Nausea or Vomiting 21   Jeremiah Meadows MD       Current medications:    Current Facility-Administered Medications   Medication Dose Route Frequency Provider Last Rate Last Admin    iopamidol (ISOVUE-370) 76 % injection 75 mL  75 mL Intravenous ONCE PRN Christine Hilario MD        ciprofloxacin (CIPRO) IVPB 400 mg  400 mg Intravenous Q12H Christine Hilario MD   Stopped at 06/11/21 0722    metronidazole (FLAGYL) 500 mg in NaCl 100 mL IVPB premix  500 mg Intravenous Q8H Christine Hilario MD   Stopped at 06/11/21 0358    traMADol (ULTRAM) tablet 50 mg  50 mg Oral Q6H PRN Christine Hilario MD        spironolactone (ALDACTONE) tablet 25 mg  25 mg Oral Daily Christine Hilario MD   25 mg at 06/11/21 0733    carvedilol (COREG) tablet 25 mg  25 mg Oral BID WC Christine Hilario MD   25 mg at 06/11/21 6628    amiodarone (CORDARONE) tablet 200 mg  200 mg Oral Daily Christine Hilario MD   200 mg at 06/11/21 0733    atorvastatin (LIPITOR) tablet 80 mg  80 mg Oral Nightly Christine Hilario MD   80 mg at 06/10/21 2029    aspirin EC tablet 81 mg  81 mg Oral Once Christine Hilario MD        pantoprazole (PROTONIX) tablet 40 mg  40 mg Oral QAM AC Christine Hilario MD   40 mg at 06/10/21 0602    sodium chloride flush 0.9 % injection 5-40 mL  5-40 mL Intravenous 2 times per day Christine Hilario MD        sodium chloride flush 0.9 % injection 5-40 mL  5-40 mL Intravenous PRN Christine Hilario MD        0.9 % sodium chloride infusion  25 mL Intravenous PRN Christine Hilario MD        ondansetron (ZOFRAN-ODT) disintegrating tablet 4 mg  4 mg Oral Q8H PRN Christine Hilario MD        Or    [facility]ron [facility] injection 4 mg  4 mg Intravenous Q6H PRN Christine Hilario MD        polyethylene glycol [facility]) packet 17 g  17 g Oral Daily PRN Christine Hilario MD        acetaminophen (TYLENOL) tablet 650 mg  650 mg Oral Q6H PRN Christine Hilario MD        Or    acetaminophen (TYLENOL) suppository 650 mg  650 mg Rectal Q6H PRN Christine Hilario MD        0.9 % sodium chloride infusion   Intravenous Continuous Christine Hilario MD 75 mL/hr at 06/10/21 1755 New Bag at 06/10/21 1755       Allergies:  No Known 10/1/1990     Years since quittin.7    Smokeless tobacco: Never Used    Tobacco comment: quit 25 years ago   Substance Use Topics    Alcohol use: Yes     Alcohol/week: 2.0 standard drinks     Types: 1 Glasses of wine, 1 Cans of beer per week                                Counseling given: Not Answered  Comment: quit 25 years ago      Vital Signs (Current):   Vitals:    06/10/21 1755 06/10/21 2116 21 0452 21 0724   BP: (!) 114/50 (!) 114/54  119/61   Pulse: 76 65  69   Resp:  15  17   Temp:  98.6 °F (37 °C)  98.6 °F (37 °C)   TempSrc:  Oral  Oral   SpO2:  91%  90%   Weight:   233 lb 4 oz (105.8 kg)    Height:                                                  BP Readings from Last 3 Encounters:   21 119/61   21 (!) 109/50   21 (!) 168/59       NPO Status: Time of last liquid consumption:                         Time of last solid consumption:                         Date of last liquid consumption: 06/10/21                        Date of last solid food consumption: 06/10/21    BMI:   Wt Readings from Last 3 Encounters:   21 233 lb 4 oz (105.8 kg)   21 234 lb 3.2 oz (106.2 kg)   21 224 lb 3.3 oz (101.7 kg)     Body mass index is 37.65 kg/m².     CBC:   Lab Results   Component Value Date    WBC 13.7 2021    RBC 3.92 2021    HGB 12.3 2021    HCT 35.9 2021    MCV 91.5 2021    RDW 13.2 2021    PLT 70 2021       CMP:   Lab Results   Component Value Date     2021    K 3.5 2021    K 3.5 06/10/2021     2021    CO2 23 2021    BUN 14 2021    CREATININE 1.3 2021    GFRAA >60 2021    GFRAA >60 2013    AGRATIO 1.4 2021    LABGLOM 53 2021    GLUCOSE 157 2021    PROT 5.8 2021    PROT 7.1 2011    CALCIUM 7.7 2021    BILITOT 0.7 2021    ALKPHOS 47 2021    AST 11 2021    ALT 9 2021       POC Tests: No results for Prophylactic antiemetics administered. Anesthetic plan and risks discussed with patient, spouse and child/children. Plan discussed with CRNA. This pre-anesthesia assessment may be used as a history and physical.    DOS STAFF ADDENDUM:    Pt seen and examined, chart reviewed (including anesthesia, drug and allergy history). No interval changes to history and physical examination. Anesthetic plan, risks, benefits, alternatives, and personnel involved discussed with patient. Patient verbalized an understanding and agrees to proceed.       Nayan Jenkins MD  June 11, 2021  10:51 AM      Nayan Jenkins MD   6/11/2021

## 2021-06-11 NOTE — PROGRESS NOTES
Pt arrived to PACU from OR. Pt sleeping on 2l/nc with oral airway in place. Abd soft and round. Dressings x 4 C/D/I.

## 2021-06-11 NOTE — PROGRESS NOTES
Received call from Dr. Sunny Brandt. MD is concerned about platelet dropping to 70. He is wanting Hem/onc to be consulted, and for Dr. Jose Reynolds to be made aware of pt platelet levels. Dr. Chase Ornelas also said that HIT screening order was placed. Per lab, it was drawn and won't be read until 1100. This RN spoke to Cj in Pre-Op about this information. Marcy Ng called out to Dr. Jose Reynolds making him aware of information. This RN received call back from Marcy Ng saying that Dr. Jose Reynolds is okay to proceed with the surgery and that patient will be taken down soon (15-20 minutes). Will continue to monitor and reassess.   Electronically signed by Bekah Frye RN on 6/11/2021 at 10:02 AM

## 2021-06-11 NOTE — PLAN OF CARE
Problem: Pain:  Goal: Pain level will decrease  Description: Pain level will decrease  6/11/2021 0722 by Elza Almodovar RN  Outcome: Ongoing  Note: Assessing and monitoring pt's pain. PRN pain medication being given if ordered. Educating pt on nonpharmacologic interventions for pain control. Will continue to monitor and reassess. Problem: Pain:  Goal: Control of acute pain  Description: Control of acute pain  6/11/2021 0722 by Elza Almodovar RN  Outcome: Ongoing  Note: Assessing and monitoring pt's pain. PRN pain medication being given if ordered. Educating pt on nonpharmacologic interventions for pain control. Will continue to monitor and reassess. Problem: Pain:  Goal: Control of chronic pain  Description: Control of chronic pain  6/11/2021 0722 by Elza Almodovar RN  Outcome: Ongoing  Note: Assessing and monitoring pt's pain. PRN pain medication being given if ordered. Educating pt on nonpharmacologic interventions for pain control. Will continue to monitor and reassess. Problem: Falls - Risk of:  Goal: Will remain free from falls  Description: Will remain free from falls  6/11/2021 0620 by Elza Almodovar RN  Outcome: Ongoing  Note: Fall risk assessment completed. Fall precautions in place. Bed in lowest position, wheels locked, bed/chair exit alarm in place, call light within reach, and non skid footwear on. Walkway free of clutter. Pt alert and oriented and able to make needs known. Pt educated to use call light when needing to get up, and pt utilizes call light to make needs known. Will continue to monitor.         Problem: Falls - Risk of:  Goal: Absence of physical injury  Description: Absence of physical injury  6/11/2021 5683 by Elza Almodovar RN  Outcome: Ongoing  Note: Pt absent from physical injury on this shift      Problem: OXYGENATION/RESPIRATORY FUNCTION  Goal: Patient will maintain patent airway  6/11/2021 0722 by Elza Almodovar RN  Outcome: Ongoing  Note: Patient will maintain patent airway on this shift      Problem: OXYGENATION/RESPIRATORY FUNCTION  Goal: Patient will achieve/maintain normal respiratory rate/effort  Description: Respiratory rate and effort will be within normal limits for the patient  6/11/2021 0073 by Brodie Marley RN  Outcome: Ongoing  Note: Patient will achieve and maintain normal respiratory rate/effort      Problem: HEMODYNAMIC STATUS  Goal: Patient has stable vital signs and fluid balance  6/11/2021 0722 by Brodie Marley RN  Outcome: Ongoing  Note: Pt will have stable vital signs and fluid balance      Problem: FLUID AND ELECTROLYTE IMBALANCE  Goal: Fluid and electrolyte balance are achieved/maintained  6/11/2021 0722 by Brodie Marley RN  Outcome: Ongoing  Note: Fluid and electrolyte balance are achieved/maintained      Problem: ACTIVITY INTOLERANCE/IMPAIRED MOBILITY  Goal: Mobility/activity is maintained at optimum level for patient  6/11/2021 3732 by Brodie Marley RN  Outcome: Ongoing  Note: Mobility will be maintained optimum level for pt

## 2021-06-11 NOTE — CARE COORDINATION
INITIAL CASE MANAGEMENT ASSESSMENT    Reviewed chart, met with patient to assess possible discharge needs. Explained Case Management role/services. The sw student talked to the patient's spouse and son on behalf of the patient at bedside,     Living Situation: the patient's spouse states that the patient lives a house with spouse, he has no pets and he uses two steps before entering his home. ADLs: The patient is independent. DME: The patient has no durable medical equipment at home. PT/OT Recs: N/A      Active Services: The patient has no active services at this time. Transportation: The patient's son and spouse states that he is an active  and the patient's wife will  at discharge. Medications: The patient's spouse states that the patient gets his medications from [facility] (only if its a drier need) and he also uses [place] mail delivery. He has no issues with getting medications     PCP: Aj Jin [phone](OQPPM number) [phone](UVG number). The patient last saw his PCP on May 9th 2021. HD/PD: N/A    PLAN/COMMENTS: The patient has no psychosocial needs at this time. SW provided contact information for patient or family to call with any questions. SW will follow and assist as needed.     Yared Gee (MSW intern)  [facility] [place]  Phone [phone]  Fax [phone]  Electronically signed by Tabby Cheng on 6/11/2021 at 2:57 PM

## 2021-06-11 NOTE — CONSULTS
Hematology Consult    See dictation    A/P:  1. Thrombocytopenia. This is likely due to increased consumption due to his gallbladder infection as well as medication related (i.e. Flagyl). His HIT screen was negative. I will check for B12 and folate deficiency and thyroid disease. I will check an SPEP to rule out myeloma. I would transfuse to keep platelets greater than 50. If they fall below 50 tomorrow or if he starts having bleeding overnight, I would transfuse platelets. He should follow-up with me 1 to 2 weeks after discharge to document resolution. Thank you for the consultation. I will follow closely.     Jessica Patel MD

## 2021-06-11 NOTE — PROGRESS NOTES
Physician Progress Note      PATIENT:               Soy Beck  CSN #:                  [id number]  :                       1940  ADMIT DATE:       2021 5:43 PM  DISCH DATE:  Sharon Metcalf  PROVIDER #:        Jon Sanchez MD          QUERY TEXT:    Dear Dr. Mohamud Lindsey,  [address] admitted with R sided Abdominal pain. Pt noted to have Leukocytosis,   Neutrophilia, Glucose>140 in a non diabetic, low plt, elevated creat . If   possible, please document in the progress notes and discharge summary if you   are evaluating and /or treating any of the following: The medical record reflects the following:  Risk Factors: Hx Diverticulosis, seen recently in ED and treated for possible   diverticular infection  Clinical Indicators:  Direct admit after seen in office by PCP for abd   pain, WBC=20.2, Neutrophils=17.6, Yaj=613,Glucose=141, Temp=100, 6/10 PCP   notes \"Admit for right sided abdominal pain ,fever -suggesting an infectious   etiology,Creat=1.4, GFR=49, Glucose=161, WBC=17.6, Neutrophils=16, Plt=90,  Treatment: IVF, abx, Cipro,CT scan, Surg consult  Thank you,  Renee Pate RN, CDS  [email or web address]  Options provided:  -- Sepsis, present on admission  -- Sepsis, present on admission, now resolved  -- Sepsis was ruled out  -- Other - I will add my own diagnosis  -- Disagree - Not applicable / Not valid  -- Disagree - Clinically unable to determine / Unknown  -- Refer to Clinical Documentation Reviewer    PROVIDER RESPONSE TEXT:    After further study, sepsis was ruled out for this patient. Query created by:  [address] on 6/10/2021 11:46 AM      Electronically signed by:  Jno Sanchez MD 2021 9:34 AM

## 2021-06-11 NOTE — PROGRESS NOTES
Patient is alert & oriented x4, SBA, 2/4 bed rails up, bed in lowest position, fall precautions in place, call light within reach. Pt remains NPO for surgery today. Morning assessment complete. Morning medications given. Pt wishing to rest at this time. Will cont to monitor and reassess.   Electronically signed by Sanchez Parham RN on 6/11/2021 at 7:41 AM

## 2021-06-11 NOTE — BRIEF OP NOTE
Brief Postoperative Note      Patient: Tien Gale  YOB: 1940  MRN: [id number]    Date of Procedure: 6/11/2021    Pre-Op Diagnosis: acute CHOLECYSTITIS    Post-Op Diagnosis: Same       Procedure(s):  LAPAROSCOPIC CHOLECYSTECTOMY WITH CHOLANGIOGRAM,    Surgeon(s):  Uche Kidd MD    Assistant:  Surgical Assistant: Angela Bateman    Anesthesia: General    Estimated Blood Loss (mL): less than 50     Complications: None    Specimens:   ID Type Source Tests Collected by Time Destination   A : gallbladder and contents Tissue Gallbladder SURGICAL PATHOLOGY Uche Kidd MD 6/11/2021 1217        Implants:  * No implants in log *      Drains: * No LDAs found *    Findings: significant inflammation of the gallbladder, normal cholangiogram    Electronically signed by Divina Johnson MD on 6/11/2021 at 12:56 PM

## 2021-06-11 NOTE — PLAN OF CARE
Problem: Pain:  Goal: Pain level will decrease  Description: Pain level will decrease  6/10/2021 2222 by Farhad Jane RN  Outcome: Ongoing  Note: Pt assessed for pain. Pt denies any pain at this time. Will continue to monitor pt and assess for pain throughout rest of shift. 6/10/2021 1129 by Radu Kimble RN  Outcome: Ongoing  Note: Pain /discomfort being managed with PRN analgesics per MD orders. Patient able to express presence and absence of pain and rate pain appropriately using numerical scale. Goal: Control of acute pain  Description: Control of acute pain  6/10/2021 2222 by Farhad Jane RN  Outcome: Ongoing  Note: Pt assessed for pain. Pt denies any pain at this time. Will continue to monitor pt and assess for pain throughout rest of shift. 6/10/2021 1129 by Radu Kimble RN  Outcome: Ongoing  Note: Patient educated on acute pain. Taught patient to use call light to ask for pain medication. PRN pain medication given for acute pain. Will continue to monitor pain per unit protocol. Goal: Control of chronic pain  Description: Control of chronic pain  6/10/2021 2222 by Farhad Jane RN  Outcome: Ongoing  Note: Pt assessed for pain. Pt denies any pain at this time. Will continue to monitor pt and assess for pain throughout rest of shift. 6/10/2021 1129 by Radu Kimble RN  Outcome: Ongoing  Note: Patient educated on Chronic pain. Taught patient to use call light to ask for pain medication. PRN pain medication given for pain. Will continue to monitor pain per unit protocol. Problem: Falls - Risk of:  Goal: Will remain free from falls  Description: Will remain free from falls  6/10/2021 2222 by Farhad Jane RN  Outcome: Ongoing  Note: Fall risk assessment completed. Fall precautions in place. Call light within reach. Pt educated on calling for assistance before getting up. Walkway free of clutter. Will continue to monitor.     6/10/2021 1129 by Radu Kimble RN  Outcome: Ongoing  Note: Fall risk assessment completed . Fall precautions in place, chair alarm on, side rails 2/4 up, call light in reach, educated pt on calling for assistance when needed, room clear of clutter. Pt verbalized understanding. Goal: Absence of physical injury  Description: Absence of physical injury  6/10/2021 2222 by Fortino Reeves RN  Outcome: Ongoing  Note: Pt is free of injury. No injury noted. Fall precautions in place. Call light within reach. Will monitor. 6/10/2021 1129 by Toña Mo RN  Outcome: Ongoing  Note: Pt is free of injury. No injury noted. Fall precautions in place. Call light within reach. Will monitor.        Problem: OXYGENATION/RESPIRATORY FUNCTION  Goal: Patient will maintain patent airway  Outcome: Ongoing  Goal: Patient will achieve/maintain normal respiratory rate/effort  Description: Respiratory rate and effort will be within normal limits for the patient  Outcome: Ongoing     Problem: HEMODYNAMIC STATUS  Goal: Patient has stable vital signs and fluid balance  Outcome: Ongoing     Problem: FLUID AND ELECTROLYTE IMBALANCE  Goal: Fluid and electrolyte balance are achieved/maintained  Outcome: Ongoing     Problem: ACTIVITY INTOLERANCE/IMPAIRED MOBILITY  Goal: Mobility/activity is maintained at optimum level for patient  Outcome: Ongoing

## 2021-06-12 LAB
ANION GAP SERPL CALCULATED.3IONS-SCNC: 14 MMOL/L (ref 3–16)
BASOPHILS ABSOLUTE: 0 K/UL (ref 0–0.2)
BASOPHILS RELATIVE PERCENT: 0.1 %
BUN BLDV-MCNC: 23 MG/DL (ref 7–20)
CALCIUM SERPL-MCNC: 7.9 MG/DL (ref 8.3–10.6)
CHLORIDE BLD-SCNC: 100 MMOL/L (ref 99–110)
CO2: 20 MMOL/L (ref 21–32)
CREAT SERPL-MCNC: 1.3 MG/DL (ref 0.8–1.3)
EOSINOPHILS ABSOLUTE: 0 K/UL (ref 0–0.6)
EOSINOPHILS RELATIVE PERCENT: 0 %
FIBRINOGEN: 731 MG/DL (ref 200–397)
FOLATE: 11.47 NG/ML (ref 4.78–24.2)
GFR AFRICAN AMERICAN: >60
GFR NON-AFRICAN AMERICAN: 53
GLUCOSE BLD-MCNC: 342 MG/DL (ref 70–99)
HAPTOGLOBIN: 297 MG/DL (ref 30–200)
HCT VFR BLD CALC: 36.2 % (ref 40.5–52.5)
HEMOGLOBIN: 12.3 G/DL (ref 13.5–17.5)
LYMPHOCYTES ABSOLUTE: 0.3 K/UL (ref 1–5.1)
LYMPHOCYTES RELATIVE PERCENT: 2.4 %
MAGNESIUM: 2.2 MG/DL (ref 1.8–2.4)
MCH RBC QN AUTO: 31.6 PG (ref 26–34)
MCHC RBC AUTO-ENTMCNC: 34.1 G/DL (ref 31–36)
MCV RBC AUTO: 92.6 FL (ref 80–100)
MONOCYTES ABSOLUTE: 0.8 K/UL (ref 0–1.3)
MONOCYTES RELATIVE PERCENT: 6.9 %
NEUTROPHILS ABSOLUTE: 9.9 K/UL (ref 1.7–7.7)
NEUTROPHILS RELATIVE PERCENT: 90.6 %
PDW BLD-RTO: 13.6 % (ref 12.4–15.4)
PLATELET # BLD: 81 K/UL (ref 135–450)
PMV BLD AUTO: 10 FL (ref 5–10.5)
POTASSIUM SERPL-SCNC: 4.2 MMOL/L (ref 3.5–5.1)
RBC # BLD: 3.9 M/UL (ref 4.2–5.9)
SODIUM BLD-SCNC: 134 MMOL/L (ref 136–145)
TSH REFLEX: 1.09 UIU/ML (ref 0.27–4.2)
VITAMIN B-12: 923 PG/ML (ref 211–911)
WBC # BLD: 10.9 K/UL (ref 4–11)

## 2021-06-12 PROCEDURE — 6370000000 HC RX 637 (ALT 250 FOR IP): Performed by: INTERNAL MEDICINE

## 2021-06-12 PROCEDURE — 99024 POSTOP FOLLOW-UP VISIT: CPT | Performed by: SURGERY

## 2021-06-12 PROCEDURE — 2580000003 HC RX 258: Performed by: INTERNAL MEDICINE

## 2021-06-12 PROCEDURE — APPSS15 APP SPLIT SHARED TIME 0-15 MINUTES: Performed by: NURSE PRACTITIONER

## 2021-06-12 PROCEDURE — 84443 ASSAY THYROID STIM HORMONE: CPT

## 2021-06-12 PROCEDURE — 2500000003 HC RX 250 WO HCPCS: Performed by: INTERNAL MEDICINE

## 2021-06-12 PROCEDURE — 94760 N-INVAS EAR/PLS OXIMETRY 1: CPT

## 2021-06-12 PROCEDURE — 6360000002 HC RX W HCPCS: Performed by: INTERNAL MEDICINE

## 2021-06-12 PROCEDURE — 36415 COLL VENOUS BLD VENIPUNCTURE: CPT

## 2021-06-12 PROCEDURE — 82607 VITAMIN B-12: CPT

## 2021-06-12 PROCEDURE — 84165 PROTEIN E-PHORESIS SERUM: CPT

## 2021-06-12 PROCEDURE — 83735 ASSAY OF MAGNESIUM: CPT

## 2021-06-12 PROCEDURE — APPNB15 APP NON BILLABLE TIME 0-15 MINS: Performed by: NURSE PRACTITIONER

## 2021-06-12 PROCEDURE — 99233 SBSQ HOSP IP/OBS HIGH 50: CPT | Performed by: INTERNAL MEDICINE

## 2021-06-12 PROCEDURE — 2700000000 HC OXYGEN THERAPY PER DAY

## 2021-06-12 PROCEDURE — 80048 BASIC METABOLIC PNL TOTAL CA: CPT

## 2021-06-12 PROCEDURE — 83010 ASSAY OF HAPTOGLOBIN QUANT: CPT

## 2021-06-12 PROCEDURE — 84155 ASSAY OF PROTEIN SERUM: CPT

## 2021-06-12 PROCEDURE — 85025 COMPLETE CBC W/AUTO DIFF WBC: CPT

## 2021-06-12 PROCEDURE — 1200000000 HC SEMI PRIVATE

## 2021-06-12 PROCEDURE — 85384 FIBRINOGEN ACTIVITY: CPT

## 2021-06-12 PROCEDURE — 82746 ASSAY OF FOLIC ACID SERUM: CPT

## 2021-06-12 RX ORDER — POLYETHYLENE GLYCOL 3350 17 G/17G
17 POWDER, FOR SOLUTION ORAL DAILY
Status: DISCONTINUED | OUTPATIENT
Start: 2021-06-13 | End: 2021-06-14 | Stop reason: HOSPADM

## 2021-06-12 RX ORDER — POLYETHYLENE GLYCOL 3350 17 G/17G
17 POWDER, FOR SOLUTION ORAL EVERY 6 HOURS
Status: COMPLETED | OUTPATIENT
Start: 2021-06-12 | End: 2021-06-12

## 2021-06-12 RX ADMIN — PANTOPRAZOLE SODIUM 40 MG: 40 TABLET, DELAYED RELEASE ORAL at 06:50

## 2021-06-12 RX ADMIN — AMIODARONE HYDROCHLORIDE 200 MG: 200 TABLET ORAL at 08:17

## 2021-06-12 RX ADMIN — CARVEDILOL 25 MG: 25 TABLET, FILM COATED ORAL at 08:17

## 2021-06-12 RX ADMIN — ATORVASTATIN CALCIUM 80 MG: 80 TABLET, FILM COATED ORAL at 21:12

## 2021-06-12 RX ADMIN — METRONIDAZOLE 500 MG: 500 INJECTION, SOLUTION INTRAVENOUS at 11:02

## 2021-06-12 RX ADMIN — POLYETHYLENE GLYCOL 3350 17 G: 17 POWDER, FOR SOLUTION ORAL at 21:12

## 2021-06-12 RX ADMIN — CIPROFLOXACIN 400 MG: 2 INJECTION, SOLUTION INTRAVENOUS at 06:50

## 2021-06-12 RX ADMIN — CARVEDILOL 25 MG: 25 TABLET, FILM COATED ORAL at 17:07

## 2021-06-12 RX ADMIN — TRAMADOL HYDROCHLORIDE 50 MG: 50 TABLET, FILM COATED ORAL at 01:49

## 2021-06-12 RX ADMIN — POLYETHYLENE GLYCOL 3350 17 G: 17 POWDER, FOR SOLUTION ORAL at 15:10

## 2021-06-12 RX ADMIN — CIPROFLOXACIN 400 MG: 2 INJECTION, SOLUTION INTRAVENOUS at 17:59

## 2021-06-12 RX ADMIN — METRONIDAZOLE 500 MG: 500 INJECTION, SOLUTION INTRAVENOUS at 18:13

## 2021-06-12 RX ADMIN — TRAMADOL HYDROCHLORIDE 50 MG: 50 TABLET, FILM COATED ORAL at 21:17

## 2021-06-12 RX ADMIN — Medication 10 ML: at 08:17

## 2021-06-12 RX ADMIN — METRONIDAZOLE 500 MG: 500 INJECTION, SOLUTION INTRAVENOUS at 01:59

## 2021-06-12 RX ADMIN — SPIRONOLACTONE 25 MG: 25 TABLET ORAL at 08:14

## 2021-06-12 ASSESSMENT — PAIN DESCRIPTION - PROGRESSION: CLINICAL_PROGRESSION: NOT CHANGED

## 2021-06-12 ASSESSMENT — PAIN SCALES - GENERAL
PAINLEVEL_OUTOF10: 0
PAINLEVEL_OUTOF10: 5
PAINLEVEL_OUTOF10: 6
PAINLEVEL_OUTOF10: 3

## 2021-06-12 ASSESSMENT — PAIN DESCRIPTION - PAIN TYPE: TYPE: ACUTE PAIN;SURGICAL PAIN

## 2021-06-12 ASSESSMENT — PAIN DESCRIPTION - DESCRIPTORS: DESCRIPTORS: CONSTANT

## 2021-06-12 ASSESSMENT — PAIN - FUNCTIONAL ASSESSMENT: PAIN_FUNCTIONAL_ASSESSMENT: PREVENTS OR INTERFERES SOME ACTIVE ACTIVITIES AND ADLS

## 2021-06-12 ASSESSMENT — PAIN DESCRIPTION - ONSET: ONSET: ON-GOING

## 2021-06-12 ASSESSMENT — PAIN DESCRIPTION - LOCATION: LOCATION: ABDOMEN

## 2021-06-12 ASSESSMENT — PAIN DESCRIPTION - FREQUENCY: FREQUENCY: INTERMITTENT

## 2021-06-12 NOTE — PROGRESS NOTES
[facility]  Oncology Hematology Care  Progress Note      SUBJECTIVE:   Pt sp cholecystectomy   His plts are a little better   He denies bleeding   B12 folate wnl   NO sign of hemolysis   ROS: No fever chills sweats, no nausea, vomiting, diarrhea  shortness of breath   OBJECTIVE      Medications    Current Facility-Administered Medications: morphine (PF) injection 2 mg, 2 mg, Intravenous, Q2H PRN **OR** morphine (PF) injection 4 mg, 4 mg, Intravenous, Q2H PRN  iopamidol (ISOVUE-370) 76 % injection 75 mL, 75 mL, Intravenous, ONCE PRN  ciprofloxacin (CIPRO) IVPB 400 mg, 400 mg, Intravenous, Q12H  metronidazole (FLAGYL) 500 mg in NaCl 100 mL IVPB premix, 500 mg, Intravenous, Q8H  traMADol (ULTRAM) tablet 50 mg, 50 mg, Oral, Q6H PRN  spironolactone (ALDACTONE) tablet 25 mg, 25 mg, Oral, Daily  carvedilol (COREG) tablet 25 mg, 25 mg, Oral, BID WC  amiodarone (CORDARONE) tablet 200 mg, 200 mg, Oral, Daily  atorvastatin (LIPITOR) tablet 80 mg, 80 mg, Oral, Nightly  aspirin EC tablet 81 mg, 81 mg, Oral, Once  pantoprazole (PROTONIX) tablet 40 mg, 40 mg, Oral, QAM AC  sodium chloride flush 0.9 % injection 5-40 mL, 5-40 mL, Intravenous, 2 times per day  sodium chloride flush 0.9 % injection 5-40 mL, 5-40 mL, Intravenous, PRN  0.9 % sodium chloride infusion, 25 mL, Intravenous, PRN  ondansetron (ZOFRAN-ODT) disintegrating tablet 4 mg, 4 mg, Oral, Q8H PRN **OR** ondansetron (ZOFRAN) injection 4 mg, 4 mg, Intravenous, Q6H PRN  polyethylene glycol (GLYCOLAX) packet 17 g, 17 g, Oral, Daily PRN  acetaminophen (TYLENOL) tablet 650 mg, 650 mg, Oral, Q6H PRN **OR** acetaminophen (TYLENOL) suppository 650 mg, 650 mg, Rectal, Q6H PRN  0.9 % sodium chloride infusion, , Intravenous, Continuous  Physical    VITALS:  /72   Pulse 76   Temp 97.6 °F (36.4 °C) (Oral)   Resp 16   Ht 5' 6\" (1.676 m)   Wt 233 lb 11 oz (106 kg)   SpO2 91%   BMI 37.72 kg/m²   TEMPERATURE:  Current - Temp: 97.6 °F (36.4 °C);  Max - Temp  Av.4 °F (36.9 °C)  Min: 82.8 °F (28.2 °C)  Max: 100 °F (37.8 °C)  PULSE OXIMETRY RANGE: SpO2  Av %  Min: 90 %  Max: 99 %  24HR INTAKE/OUTPUT:      Intake/Output Summary (Last 24 hours) at 2021 1105  Last data filed at 2021 0858  Gross per 24 hour   Intake 2500 ml   Output 50 ml   Net 2450 ml       CONSTITUTIONAL:  awake, alert, cooperative, no apparent distress, HEENT oral pharynx , no scleral icterus  HEMATOLOGIC/LYMPHATICS:  no cervical lymphadenopathy, no supraclavicular lymphadenopathy,y  LUNGS:  No increased work of breathing, good air exchange, clear to auscultation bilaterally, no crackles or wheezing  CARDIOVASCULAR:  , regular rate and rhythm, normal S1 and S2, no S3 or S4, and no murmur noted  ABDOMEN:  Appropriate  tender   MUSCULOSKELETAL:  There is no redness, warmth, or swelling of the joints. EXTREMETIES: No clubbing cynosis or edema  NEUROLOGIC:  Awake, alert, oriented to name, place and time. Cranial nerves II-XII are grossly intact.    SKIN:  no bruising or bleeding      Data      Recent Labs     06/10/21  0454 21  0423 21  0514   WBC 17.6* 13.7* 10.9   HGB 13.1* 12.3* 12.3*   HCT 37.9* 35.9* 36.2*   PLT 90* 70* 81*   MCV 91.4 91.5 92.6        Recent Labs     06/10/21  0454 21  0423 21  0514   * 135* 134*   K 3.5 3.5 4.2   CL 99 100 100   CO2 23 23 20*   BUN 18 14 23*   CREATININE 1.4* 1.3 1.3     Recent Labs     21  1826 21  0423   AST 13* 11*   ALT 10 9*   BILIDIR  --  <0.2   BILITOT 1.4* 0.7   ALKPHOS 44 47       Magnesium:    Lab Results   Component Value Date    MG 2.20 2021    MG 1.90 2021    MG 1.70 06/10/2021       Imaging XR CHEST (2 VW)    Result Date: 2021  EXAMINATION: TWO XRAY VIEWS OF THE CHEST 2021 6:50 pm COMPARISON: 09/15/2016 HISTORY: ORDERING SYSTEM PROVIDED HISTORY: dyspne ar/o chf TECHNOLOGIST PROVIDED HISTORY: Reason for exam:->dyspne ar/o chf Reason for Exam: dyspne ar/o chf Acuity: Acute Type of Exam: Initial FINDINGS: Heart size is normal.  The lungs appear clear. No adenopathy or pleural effusion. No pneumothorax. Flattening of the diaphragm noted on the lateral view. Descending aorta is mildly tortuous. Clear lungs. No acute abnormality. No significant change from the prior study. CT ABDOMEN PELVIS W IV CONTRAST Additional Contrast? Radiologist Recommendation    Result Date: 6/10/2021  EXAMINATION: CT OF THE ABDOMEN AND PELVIS WITH CONTRAST 6/10/2021 11:02 am TECHNIQUE: CT of the abdomen and pelvis was performed with the administration of intravenous contrast. Multiplanar reformatted images are provided for review. Dose modulation, iterative reconstruction, and/or weight based adjustment of the mA/kV was utilized to reduce the radiation dose to as low as reasonably achievable. COMPARISON: 06/08/2021 HISTORY: ORDERING SYSTEM PROVIDED HISTORY: rigth mid abdominal pain fever leukocytosis- TECHNOLOGIST PROVIDED HISTORY: Reason for exam:->rigth mid abdominal pain fever leukocytosis- Additional Contrast?->Radiologist Recommendation Reason for Exam: rigth mid abdominal pain fever leukocytosis- Acuity: Acute Type of Exam: Initial FINDINGS: Lower Chest: There is been mild interval increase in right basilar segmental atelectasis. The left lung base is clear. There is no pleural effusion. Organs: There has been interval development of circumferential wall thickening of the gallbladder with mild surrounding inflammatory change. The liver, pancreas, spleen, kidneys and adrenals are stable in appearance. GI/Bowel: There is no bowel dilatation, wall thickening or obstruction. Diverticular changes are scattered throughout the left hemicolon. The appendix is normal. Pelvis: The bladder and prostate are unremarkable. Peritoneum/Retroperitoneum: There is no free air. A small amount of pericholecystic fluid is noted. There is no adenopathy. Bones/Soft Tissues: There is no acute fracture or aggressive osseous lesion. 1. Acute cholecystitis. 2. Diverticulosis. CT ABDOMEN PELVIS W IV CONTRAST Additional Contrast? None    Result Date: 6/8/2021  EXAMINATION: CT OF THE ABDOMEN AND PELVIS WITH CONTRAST 6/8/2021 12:58 pm TECHNIQUE: CT of the abdomen and pelvis was performed with the administration of intravenous contrast. Multiplanar reformatted images are provided for review. Dose modulation, iterative reconstruction, and/or weight based adjustment of the mA/kV was utilized to reduce the radiation dose to as low as reasonably achievable. COMPARISON: None. HISTORY: ORDERING SYSTEM PROVIDED HISTORY: abd pain TECHNOLOGIST PROVIDED HISTORY: Additional Contrast?->None Reason for exam:->abd pain Decision Support Exception - unselect if not a suspected or confirmed emergency medical condition->Emergency Medical Condition (MA) Reason for Exam: abd pain, vomiting FINDINGS: Lower Chest:The lung bases are clear Organs: The liver, spleen, adrenal glands, kidneys, and pancreas demonstrate no acute abnormality. GI/Bowel: The visualized portions of the bowel are unremarkable. Peritoneum/Retroperitoneum: Unremarkable Pelvis: Unremarkable Bones: No suspicious osseous lesions are identified     No acute intra-abdominal abnormality     FL CHOLANGIOGRAM OR    Result Date: 6/11/2021  EXAMINATION: SPOT FLUOROSCOPIC IMAGES 6/11/2021 12:34 pm TECHNIQUE: Fluoroscopy was provided by the radiology department for procedure. Radiologist was not present during examination. FLUOROSCOPY DOSE AND TYPE OR TIME AND EXPOSURES: 17.5 seconds COMPARISON: None HISTORY: Intraprocedural imaging. FINDINGS: 1 spot images of the abdomen were obtained. There is drainage of contrast into the duodenum. There are no filling defects to suggest retained calculi. The biliary tree is normal in caliber. Intraprocedural fluoroscopic spot images as above. See separate procedure report for more information.        Problem List  Patient Active Problem List   Diagnosis    Encounter for long-term (current) use of other medications    Osteoarthritis of shoulder    SVT (supraventricular tachycardia),paroxysmal.    Atrial fibrillation ([facility] [address]    Long term current use of anticoagulant therapy    Hyperglycemia    Sleep apnea-on CPAP.    HTN (hypertension)    Hyperlipidemia    Lumbar spine strain    Bunion of great toe of right foot    Chronic diastolic CHF (congestive heart failure) (HCC)    Quadriceps tendinitis-right    Closed nondisplaced fracture of left pubis with routine healing    Hordeolum externum of right upper eyelid    Seasonal allergic rhinitis    Allergic contact dermatitis due to plants, except food    Vestibular dizziness    Plantar fasciitis, right    Morbid obesity with BMI of 40.0-44.9, adult (HCC)    Generalized abdominal pain    Fever    Shortness of breath    Continuous severe abdominal pain    Acute cholecystitis    Thrombocytopenia (HCC)       ASSESSMENT AND PLAN    Thrombocytopenia  dic /ttp/hit unlikely   Likely related to infection/drugs used to treat infection  He is improving   HE should have a repeat count in about 2-3 weeks but no other intervention needed  MOniter   No need for transfusion   B12 folate wnl   If he bleeds keep plts above 50 but dont anticipate this     Anuja Guevara MD, MD

## 2021-06-12 NOTE — PROGRESS NOTES
[address] [place] [address]  --   --        EDUCATION  Patient educated about Disease Process, Medications, Smoking Cessation, Oxygenation, Incentive Spirometry and Deep Breath and Cough, Diabetes, Hyperlipidemia, Smoking Cessation, Nutrition, Exercise and Hypertension    Electronically signed by ARACELY Ware - CNP on 6/12/2021 at 10:09 AM      [facility]   [phone] Office  [phone] Cell       Surgery Staff  I have examined this patient and read and agree with the note by ARACELY Rodrigues from today.   Stable postop  Platelets slowly improving  Continue abx  PT/OT  Wean O2    Electronically signed by Chaitanya Trivedi MD on 6/12/2021 at 10:35 AM

## 2021-06-12 NOTE — PROGRESS NOTES
simón--slow post-op--will sstart diet--get pt to see him--keep until tomorrow   Active Problems:    SVT (supraventricular tachycardia),paroxysmal.    Atrial fibrillation (HCC)--stable     HTN (hypertension)--Continue current therapy      Hyperlipidemia    Chronic diastolic CHF (congestive heart failure) (HCC)--none on exam     Fever    Continuous severe abdominal pain    Thrombocytopenia (HCC)--improving--now 81K  Resolved Problems:    * No resolved hospital problems.  *  Freddie activity--start PT--follow platelets --dw amaris at bedside   Please note that over 35 minutes was spent in evaluating the patient, review of records and results, discussion with staff/family, etc.    Liat Bales MD

## 2021-06-12 NOTE — PROGRESS NOTES
Patient is A&O X4 and resting in bed this morning. Denies pain. Assessment completed and medication administered. See MAR. Patient tolerating PO intake. Following 2000 ml fluid restriction. Surgical sites have old drainage but dry and intact. Denies nausea or vomiting. IV in left FA infusing. Patient denies any additional requests at this time. Fall precautions in place, call light within reach, and bedside table nearby. Will continue to monitor and round on patient q 2 hours.      Electronically signed by Brooklyn Fields RN on 6/12/2021 at 10:00 AM

## 2021-06-12 NOTE — PROGRESS NOTES
Patient sitting in chair and is visiting with family this evening. Antibiotics infusing. See MAR. Patient denies pain at this time. Tolerated ambulating with x1 with a walker. VSS. Shift uneventful. Fall precautions in place, call light within reach, and bedside table nearby. Will continue to monitor.      Electronically signed by Gagandeep Florez RN on 6/12/2021 at 6:46 PM

## 2021-06-12 NOTE — OP NOTE
[address] [place]                                 OPERATIVE REPORT    PATIENT NAME: Beba Clemons                   :        1940  MED REC NO:   [id number]                          ROOM:       [id number]  ACCOUNT NO:   [de-identified]                           ADMIT DATE: 2021  PROVIDER:     Philip Paulino MD      DATE OF PROCEDURE:  2021    PREOPERATIVE DIAGNOSIS:  Acute cholecystitis. POSTOPERATIVE DIAGNOSIS:  Acute cholecystitis. PROCEDURE PERFORMED:  Laparoscopic cholecystectomy with cholangiogram.    SURGEON:  Philip Paulino MD    SPECIMEN:  Gallbladder. ESTIMATED BLOOD LOSS:  Less than 50 mL. COMPLICATIONS:  None. DISPOSITION:  To recovery in stable condition. INDICATION:  The patient is a 77-year-old male who has been experiencing  abdominal pain for approximately the last week. He underwent a CT scan  initially which was negative but his pain persisted and a follow-up CT  scan done yesterday now demonstrates evidence of acute cholecystitis. The risks, benefits, and alternatives of cholecystectomy were reviewed  and he agreed to proceed. The patient was noted to have a drop in his  platelets to 86,811, however, this was felt to be a reaction to his  infection and we did not feel this was prohibitive from a surgical  standpoint. It was explained that he may be at slight elevated risk of  bleeding and would require platelet transfusion if this required. He  was still willing to proceed. PROCEDURE:  The patient was brought to the operating room, placed  supine, general anesthesia and intubation were performed and the abdomen  prepped and draped in a sterile fashion. A supraumbilical incision was  made and a trocar placed with the Naomi technique. Insufflation was  established and three additional trocars placed across the right upper  quadrant.   There was a dense inflammatory into an Endopouch and extracted from the umbilical trocar  site. The liver bed was checked for bleeding. Irrigation was done and  hemostasis achieved with a combination of electrocautery and then  Surgicel. Once hemostasis was confirmed, the trocars were withdrawn. The umbilicus was closed with an 0 Vicryl in the fascia and the skin  incisions closed with 4-0 Vicryls. Dressings were applied and the  patient transferred to recovery in good condition.         Chris Canales MD    D: 06/11/2021 16:17:35       T: 06/11/2021 16:28:36     [email or web address]  Job#: [id number]     Doc#: [id number]    CC:

## 2021-06-12 NOTE — PLAN OF CARE
Problem: Pain:  Goal: Pain level will decrease  Description: Pain level will decrease  6/12/2021 5842 by Hilary Rosario RN  Outcome: Ongoing  Note: Pt assessed for pain. Pt denies any pain at this time. Will continue to monitor pt and assess for pain throughout rest of shift. 6/11/2021 2314 by Cole Betancourt RN  Outcome: Ongoing  Goal: Control of acute pain  Description: Control of acute pain  6/12/2021 0712 by Hilary Rosario RN  Outcome: Ongoing  Note: Pt assessed for pain. Pt denies any pain at this time. Will continue to monitor pt and assess for pain throughout rest of shift. 6/11/2021 2314 by Cole Betancourt RN  Outcome: Ongoing  Goal: Control of chronic pain  Description: Control of chronic pain  6/12/2021 0712 by Hilary Rosario RN  Outcome: Ongoing  Note: Pt assessed for pain. Pt denies any pain at this time. Will continue to monitor pt and assess for pain throughout rest of shift. 6/11/2021 2314 by Cole Betancourt RN  Outcome: Ongoing     Problem: Falls - Risk of:  Goal: Will remain free from falls  Description: Will remain free from falls  6/12/2021 0712 by Hilary Rosario RN  Outcome: Ongoing  Note: Patient will remain free from falls this shift. Will continue to monitor. 6/11/2021 2314 by Cole Betancourt RN  Outcome: Ongoing  Goal: Absence of physical injury  Description: Absence of physical injury  6/12/2021 0712 by Hilary Rosario RN  Outcome: Ongoing  Note: Patient free from physical injury. Will continue to monitor. 6/11/2021 2314 by Cole Betancourt RN  Outcome: Ongoing     Problem: HEMODYNAMIC STATUS  Goal: Patient has stable vital signs and fluid balance  6/12/2021 0712 by Hilary Rosario RN  Outcome: Ongoing  Note: Patients vital signs and fluid balance stable. Will continue to monitor.    6/11/2021 2314 by Cole Betancourt RN  Outcome: Ongoing     Problem: Skin Integrity:  Goal: Will show no infection signs and symptoms  Description: Will show no infection signs and symptoms  6/12/2021 0712 by Vicky Palacios RN  Outcome: Ongoing  Note: Patient showing no infection signs and symptoms. Will continue to monitor. 6/11/2021 2314 by Pedro Rodríguez RN  Outcome: Ongoing  Goal: Absence of new skin breakdown  Description: Absence of new skin breakdown  6/12/2021 0712 by Vicky Palacios RN  Outcome: Ongoing  Note: Patient free from new skin breakdown. Will continue to monitor. 6/11/2021 2314 by Pedro Rodríguez RN  Outcome: Ongoing     Problem: Skin Integrity:  Goal: Absence of new skin breakdown  Description: Absence of new skin breakdown  6/12/2021 0712 by Vicky Palacios RN  Outcome: Ongoing  Note: Patient free from new skin breakdown. Will continue to monitor.    6/11/2021 2314 by Pedro Rodríguez RN  Outcome: Ongoing

## 2021-06-13 ENCOUNTER — APPOINTMENT (OUTPATIENT)
Dept: GENERAL RADIOLOGY | Age: 81
DRG: 418 | End: 2021-06-13
Attending: INTERNAL MEDICINE
Payer: MEDICARE

## 2021-06-13 LAB
ANION GAP SERPL CALCULATED.3IONS-SCNC: 11 MMOL/L (ref 3–16)
BASOPHILS ABSOLUTE: 0 K/UL (ref 0–0.2)
BASOPHILS RELATIVE PERCENT: 0.1 %
BLOOD CULTURE, ROUTINE: NORMAL
BUN BLDV-MCNC: 27 MG/DL (ref 7–20)
CALCIUM SERPL-MCNC: 8 MG/DL (ref 8.3–10.6)
CHLORIDE BLD-SCNC: 102 MMOL/L (ref 99–110)
CO2: 22 MMOL/L (ref 21–32)
CREAT SERPL-MCNC: 1.1 MG/DL (ref 0.8–1.3)
CULTURE, BLOOD 2: NORMAL
EOSINOPHILS ABSOLUTE: 0 K/UL (ref 0–0.6)
EOSINOPHILS RELATIVE PERCENT: 0 %
ESTIMATED AVERAGE GLUCOSE: 148.5 MG/DL
GFR AFRICAN AMERICAN: >60
GFR NON-AFRICAN AMERICAN: >60
GLUCOSE BLD-MCNC: 211 MG/DL (ref 70–99)
GLUCOSE BLD-MCNC: 278 MG/DL (ref 70–99)
GLUCOSE BLD-MCNC: 286 MG/DL (ref 70–99)
GLUCOSE BLD-MCNC: 293 MG/DL (ref 70–99)
HBA1C MFR BLD: 6.8 %
HCT VFR BLD CALC: 37.3 % (ref 40.5–52.5)
HEMOGLOBIN: 12.8 G/DL (ref 13.5–17.5)
LYMPHOCYTES ABSOLUTE: 0.5 K/UL (ref 1–5.1)
LYMPHOCYTES RELATIVE PERCENT: 3.5 %
MAGNESIUM: 2.4 MG/DL (ref 1.8–2.4)
MCH RBC QN AUTO: 31.5 PG (ref 26–34)
MCHC RBC AUTO-ENTMCNC: 34.2 G/DL (ref 31–36)
MCV RBC AUTO: 91.8 FL (ref 80–100)
MONOCYTES ABSOLUTE: 1.4 K/UL (ref 0–1.3)
MONOCYTES RELATIVE PERCENT: 9.7 %
NEUTROPHILS ABSOLUTE: 12.3 K/UL (ref 1.7–7.7)
NEUTROPHILS RELATIVE PERCENT: 86.7 %
PDW BLD-RTO: 14.3 % (ref 12.4–15.4)
PERFORMED ON: ABNORMAL
PLATELET # BLD: 108 K/UL (ref 135–450)
PMV BLD AUTO: 9.7 FL (ref 5–10.5)
POTASSIUM SERPL-SCNC: 4.4 MMOL/L (ref 3.5–5.1)
RBC # BLD: 4.06 M/UL (ref 4.2–5.9)
SODIUM BLD-SCNC: 135 MMOL/L (ref 136–145)
WBC # BLD: 14.2 K/UL (ref 4–11)

## 2021-06-13 PROCEDURE — 83036 HEMOGLOBIN GLYCOSYLATED A1C: CPT

## 2021-06-13 PROCEDURE — 6370000000 HC RX 637 (ALT 250 FOR IP): Performed by: INTERNAL MEDICINE

## 2021-06-13 PROCEDURE — 97530 THERAPEUTIC ACTIVITIES: CPT

## 2021-06-13 PROCEDURE — 97161 PT EVAL LOW COMPLEX 20 MIN: CPT

## 2021-06-13 PROCEDURE — 2580000003 HC RX 258: Performed by: INTERNAL MEDICINE

## 2021-06-13 PROCEDURE — 83735 ASSAY OF MAGNESIUM: CPT

## 2021-06-13 PROCEDURE — 97116 GAIT TRAINING THERAPY: CPT

## 2021-06-13 PROCEDURE — 99024 POSTOP FOLLOW-UP VISIT: CPT | Performed by: SURGERY

## 2021-06-13 PROCEDURE — 80048 BASIC METABOLIC PNL TOTAL CA: CPT

## 2021-06-13 PROCEDURE — 85025 COMPLETE CBC W/AUTO DIFF WBC: CPT

## 2021-06-13 PROCEDURE — 99233 SBSQ HOSP IP/OBS HIGH 50: CPT | Performed by: INTERNAL MEDICINE

## 2021-06-13 PROCEDURE — 6370000000 HC RX 637 (ALT 250 FOR IP): Performed by: SURGERY

## 2021-06-13 PROCEDURE — 71045 X-RAY EXAM CHEST 1 VIEW: CPT

## 2021-06-13 PROCEDURE — 2500000003 HC RX 250 WO HCPCS: Performed by: INTERNAL MEDICINE

## 2021-06-13 PROCEDURE — 6360000002 HC RX W HCPCS: Performed by: INTERNAL MEDICINE

## 2021-06-13 PROCEDURE — 1200000000 HC SEMI PRIVATE

## 2021-06-13 PROCEDURE — 36415 COLL VENOUS BLD VENIPUNCTURE: CPT

## 2021-06-13 RX ORDER — INSULIN GLARGINE 100 [IU]/ML
10 INJECTION, SOLUTION SUBCUTANEOUS NIGHTLY
Status: DISCONTINUED | OUTPATIENT
Start: 2021-06-13 | End: 2021-06-14 | Stop reason: HOSPADM

## 2021-06-13 RX ORDER — OXYCODONE HYDROCHLORIDE 5 MG/1
5 TABLET ORAL EVERY 6 HOURS PRN
Qty: 20 TABLET | Refills: 0 | Status: SHIPPED | OUTPATIENT
Start: 2021-06-13 | End: 2021-06-18

## 2021-06-13 RX ORDER — DEXTROSE MONOHYDRATE 50 MG/ML
100 INJECTION, SOLUTION INTRAVENOUS PRN
Status: DISCONTINUED | OUTPATIENT
Start: 2021-06-13 | End: 2021-06-14 | Stop reason: HOSPADM

## 2021-06-13 RX ORDER — DEXTROSE MONOHYDRATE 25 G/50ML
12.5 INJECTION, SOLUTION INTRAVENOUS PRN
Status: DISCONTINUED | OUTPATIENT
Start: 2021-06-13 | End: 2021-06-14 | Stop reason: HOSPADM

## 2021-06-13 RX ORDER — BISACODYL 10 MG
10 SUPPOSITORY, RECTAL RECTAL ONCE
Status: COMPLETED | OUTPATIENT
Start: 2021-06-13 | End: 2021-06-13

## 2021-06-13 RX ORDER — INSULIN GLARGINE 100 [IU]/ML
5 INJECTION, SOLUTION SUBCUTANEOUS ONCE
Status: COMPLETED | OUTPATIENT
Start: 2021-06-13 | End: 2021-06-13

## 2021-06-13 RX ORDER — NICOTINE POLACRILEX 4 MG
15 LOZENGE BUCCAL PRN
Status: DISCONTINUED | OUTPATIENT
Start: 2021-06-13 | End: 2021-06-14 | Stop reason: HOSPADM

## 2021-06-13 RX ADMIN — INSULIN LISPRO 9 UNITS: 100 INJECTION, SOLUTION INTRAVENOUS; SUBCUTANEOUS at 11:53

## 2021-06-13 RX ADMIN — SODIUM CHLORIDE: 9 INJECTION, SOLUTION INTRAVENOUS at 00:38

## 2021-06-13 RX ADMIN — POLYETHYLENE GLYCOL 3350 17 G: 17 POWDER, FOR SOLUTION ORAL at 18:55

## 2021-06-13 RX ADMIN — INSULIN LISPRO 9 UNITS: 100 INJECTION, SOLUTION INTRAVENOUS; SUBCUTANEOUS at 17:56

## 2021-06-13 RX ADMIN — BISACODYL 10 MG: 10 SUPPOSITORY RECTAL at 11:53

## 2021-06-13 RX ADMIN — METRONIDAZOLE 500 MG: 500 INJECTION, SOLUTION INTRAVENOUS at 17:56

## 2021-06-13 RX ADMIN — TRAMADOL HYDROCHLORIDE 50 MG: 50 TABLET, FILM COATED ORAL at 08:28

## 2021-06-13 RX ADMIN — ATORVASTATIN CALCIUM 80 MG: 80 TABLET, FILM COATED ORAL at 21:00

## 2021-06-13 RX ADMIN — CARVEDILOL 25 MG: 25 TABLET, FILM COATED ORAL at 17:58

## 2021-06-13 RX ADMIN — AMIODARONE HYDROCHLORIDE 200 MG: 200 TABLET ORAL at 08:24

## 2021-06-13 RX ADMIN — METRONIDAZOLE 500 MG: 500 INJECTION, SOLUTION INTRAVENOUS at 11:53

## 2021-06-13 RX ADMIN — Medication 10 ML: at 21:00

## 2021-06-13 RX ADMIN — POLYETHYLENE GLYCOL 3350 17 G: 17 POWDER, FOR SOLUTION ORAL at 08:23

## 2021-06-13 RX ADMIN — TRAMADOL HYDROCHLORIDE 50 MG: 50 TABLET, FILM COATED ORAL at 21:03

## 2021-06-13 RX ADMIN — INSULIN GLARGINE 10 UNITS: 100 INJECTION, SOLUTION SUBCUTANEOUS at 21:09

## 2021-06-13 RX ADMIN — INSULIN LISPRO 3 UNITS: 100 INJECTION, SOLUTION INTRAVENOUS; SUBCUTANEOUS at 21:09

## 2021-06-13 RX ADMIN — PANTOPRAZOLE SODIUM 40 MG: 40 TABLET, DELAYED RELEASE ORAL at 06:26

## 2021-06-13 RX ADMIN — SPIRONOLACTONE 25 MG: 25 TABLET ORAL at 08:24

## 2021-06-13 RX ADMIN — INSULIN GLARGINE 5 UNITS: 100 INJECTION, SOLUTION SUBCUTANEOUS at 11:53

## 2021-06-13 RX ADMIN — METRONIDAZOLE 500 MG: 500 INJECTION, SOLUTION INTRAVENOUS at 02:23

## 2021-06-13 RX ADMIN — CIPROFLOXACIN 400 MG: 2 INJECTION, SOLUTION INTRAVENOUS at 17:56

## 2021-06-13 RX ADMIN — CIPROFLOXACIN 400 MG: 2 INJECTION, SOLUTION INTRAVENOUS at 06:26

## 2021-06-13 ASSESSMENT — PAIN DESCRIPTION - LOCATION
LOCATION: ABDOMEN

## 2021-06-13 ASSESSMENT — PAIN DESCRIPTION - ONSET
ONSET: ON-GOING

## 2021-06-13 ASSESSMENT — PAIN DESCRIPTION - PROGRESSION
CLINICAL_PROGRESSION: GRADUALLY WORSENING
CLINICAL_PROGRESSION: GRADUALLY WORSENING
CLINICAL_PROGRESSION: GRADUALLY IMPROVING

## 2021-06-13 ASSESSMENT — PAIN - FUNCTIONAL ASSESSMENT
PAIN_FUNCTIONAL_ASSESSMENT: PREVENTS OR INTERFERES SOME ACTIVE ACTIVITIES AND ADLS

## 2021-06-13 ASSESSMENT — PAIN DESCRIPTION - PAIN TYPE
TYPE: ACUTE PAIN;SURGICAL PAIN

## 2021-06-13 ASSESSMENT — PAIN DESCRIPTION - FREQUENCY
FREQUENCY: CONTINUOUS

## 2021-06-13 ASSESSMENT — PAIN DESCRIPTION - ORIENTATION
ORIENTATION: RIGHT;LEFT;MID
ORIENTATION: RIGHT;LEFT;MID

## 2021-06-13 ASSESSMENT — PAIN DESCRIPTION - DESCRIPTORS
DESCRIPTORS: CONSTANT;DISCOMFORT

## 2021-06-13 ASSESSMENT — PAIN SCALES - GENERAL
PAINLEVEL_OUTOF10: 4
PAINLEVEL_OUTOF10: 0
PAINLEVEL_OUTOF10: 6
PAINLEVEL_OUTOF10: 3

## 2021-06-13 NOTE — PROGRESS NOTES
[facility] [phone]                                     Daily Progress Note                                                         Pt Name: Emile Márquez  Medical Record Number: [id number]  Date of Birth 1940   Today's Date: 6/13/2021  No chief complaint on file. ASSESSMENT/PLAN  Severe acute cholecystitis  6/11/2021 laparoscopic cholecystectomy with cholangiogram     Platelets improving conservatively  Anxious to go home. Trial suppository. OK to D/C this afternoon if he has a BM. Rx sent to pharmacy. D/W Dr. Jeremy Price has improved from yesterday. Pain is controlled. He has no nausea and no vomiting. He has passed flatus and has not had a bowel movement. He is tolerating regular PO     OBJECTIVE  VITALS:  height is 5' 6\" (1.676 m) and weight is 236 lb 5.3 oz (107.2 kg). His oral temperature is 97.8 °F (36.6 °C). His blood pressure is 129/58 (abnormal) and his pulse is 56. His respiration is 17 and oxygen saturation is 92%. INTAKE/OUTPUT:      Intake/Output Summary (Last 24 hours) at 6/13/2021 1003  Last data filed at 6/13/2021 6788  Gross per 24 hour   Intake 1607 ml   Output --   Net 1607 ml     GENERAL: alert, cooperative, no distress  LUNGS: clear to ausculation, without wheezes, rales or rhonci  HEART: normal rate and regular rhythm  ABDOMEN: Soft, appropriately tender incisions c/d/I,  distended. EXTREMITY: no cyanosis, clubbing or edema    I/O last 3 completed shifts: In: 8661 [P.O.:420;  I.V.:787; IV Piggyback:400]  Out: -       LABS  Recent Labs     06/11/21  0423 06/13/21  0426 06/13/21  0427   WBC 13.7*  --  14.2*   HGB 12.3*  --  12.8*   HCT 35.9*  --  37.3*   PLT 70*  --  108*   * 135*  --    K 3.5 4.4  --     102  --    CO2 23 22  --    BUN 14 27*  --    CREATININE 1.3 1.1  --    MG 1.90 2.40  --    CALCIUM 7.7* 8.0*  --    AST 11*  --   --    ALT 9*  --   -- BILITOT 0.7  --   --    BILIDIR <0.2  --   --        Electronically signed by Marylen Ditch, MD on 6/13/2021 at 10:04 AM

## 2021-06-13 NOTE — PROGRESS NOTES
Pt ambulated around the nursing unit 2 times with SBA and walker. Tolerated well. O2 sat 92% on RA after ambulating. Denies further needs. Will continue to monitor.

## 2021-06-13 NOTE — PROGRESS NOTES
Return call received from Dr. Eliud Campos regarding patient d/c. MD states that she would like to monitor patient for one more day to address hyperglycemia and spike in WBC. This RN to update patient w/ this information.

## 2021-06-13 NOTE — PROGRESS NOTES
Physical Therapy    Facility/Department: [facility] ORTHOPEDICS  Initial Assessment/Discharge Summary    NAME: Allison Monzon  : 1940  MRN: [id number]    Date of Service: 2021    Discharge Recommendations:  Home with assist PRN   PT Equipment Recommendations  Equipment Needed: No  Other: Pt has a Rolling Walker. Assessment   Assessment: 69 y/o male admit 2021 with Acute Cholecystitis. 2021 S/P Lap Cholecystectomy. PMH as noted including CHF, A-Fib, Diverticulosis. PTA pt living with wife in home with 2 steps to enter and 1st floor bed/bath. Pt independent daily care and functional mobility (without assist device, has walker available if needed). Pt reports adequate assist/support upon d/c. No further PT recommended at this time. Prognosis: Good  Decision Making: Low Complexity  History: 69 y/o male admit 2021 with Acute Cholecystitis. 2021 S/P Lap Cholecystectomy. PMH as noted including CHF, A-Fib, Diverticulosis. Exam: See above. Clinical Presentation: See above. Patient Education: Role of PT, POC, Need to call for assist.  Barriers to Learning: None. REQUIRES PT FOLLOW UP: No  Activity Tolerance  Activity Tolerance: Patient Tolerated treatment well       Patient Diagnosis(es): There were no encounter diagnoses. has a past medical history of Atrial fibrillation ([address] CHF (congestive heart failure) ([address] Diverticulosis, Hyperlipidemia, Hypertension, Obesity, and Unspecified sleep apnea. has a past surgical history that includes Colonoscopy (3/2007.;;); Colonoscopy (); Colonoscopy (2017); Colonoscopy (N/A, 2020); and Cholecystectomy, laparoscopic (N/A, 2021).     Restrictions  Restrictions/Precautions  Restrictions/Precautions: Up as Tolerated  Vision/Hearing  Vision: Within Functional Limits  Hearing: Within functional limits     Subjective  General  Chart Reviewed: Yes  Patient assessed for rehabilitation services?: Yes  Additional

## 2021-06-13 NOTE — PLAN OF CARE
Problem: Pain:  Goal: Pain level will decrease  Description: Pain level will decrease  6/13/2021 0745 by Tiffanie Turk RN  Outcome: Ongoing  Note: Pain managed with pharmacologic and non-pharmacologic interventions during this shift. Will continue to monitor and assess for needs and change in patient condition. 6/13/2021 0328 by Selam Kimball RN  Outcome: Ongoing  Goal: Control of acute pain  Description: Control of acute pain  6/13/2021 0745 by Tiffanie Turk RN  Outcome: Ongoing  Note: Pain managed with pharmacologic and non-pharmacologic interventions during this shift. Will continue to monitor and assess for needs and change in patient condition. 6/13/2021 0328 by Selam Kimball RN  Outcome: Ongoing  Goal: Control of chronic pain  Description: Control of chronic pain  6/13/2021 0745 by Tiffanie Turk RN  Outcome: Ongoing  Note: Pain managed with pharmacologic and non-pharmacologic interventions during this shift. Will continue to monitor and assess for needs and change in patient condition. 6/13/2021 0328 by Selam Kimball RN  Outcome: Ongoing     Problem: Falls - Risk of:  Goal: Will remain free from falls  Description: Will remain free from falls  6/13/2021 0745 by Tiffanie Turk RN  Outcome: Ongoing  Note: Patient remains free from falls during this shift. Fall precautions remain in place. Patient educated on the need to implement call light use prior to ambulation. Will continue to monitor and assess. 6/13/2021 0328 by Selam Kimball RN  Outcome: Ongoing  Goal: Absence of physical injury  Description: Absence of physical injury  6/13/2021 0745 by Tiffanie Turk RN  Outcome: Ongoing  Note: Patient remains free from physical harm during this shift. Will continue to monitor and assess patient.      6/13/2021 0328 by Selam Kimball RN  Outcome: Ongoing     Problem: OXYGENATION/RESPIRATORY FUNCTION  Goal: Patient will maintain patent airway  6/13/2021 0745 by Lamont Litten, RN  Outcome: Ongoing  Note: Patient airway remains patent this shift, will continue to monitor resp function   6/13/2021 0328 by Brennon Almeida RN  Outcome: Ongoing  Goal: Patient will achieve/maintain normal respiratory rate/effort  Description: Respiratory rate and effort will be within normal limits for the patient  6/13/2021 0745 by Lamont Litten, RN  Outcome: Ongoing  6/13/2021 0328 by Brennon Almeida RN  Outcome: Ongoing     Problem: HEMODYNAMIC STATUS  Goal: Patient has stable vital signs and fluid balance  6/13/2021 0745 by Lamont Litten, RN  Outcome: Ongoing  6/13/2021 0328 by Brennon Almeida RN  Outcome: Ongoing     Problem: FLUID AND ELECTROLYTE IMBALANCE  Goal: Fluid and electrolyte balance are achieved/maintained  6/13/2021 0745 by Lamont Litten, RN  Outcome: Ongoing  6/13/2021 0328 by Brennon Almeida RN  Outcome: Ongoing     Problem: ACTIVITY INTOLERANCE/IMPAIRED MOBILITY  Goal: Mobility/activity is maintained at optimum level for patient  6/13/2021 0745 by Lamont Litten, RN  Outcome: Ongoing  Note: Patient up walking w/ PT this morning, patient tolerated well. Will continue to promote mobility throughout shift   6/13/2021 0328 by Brennon Almeida RN  Outcome: Ongoing     Problem: Skin Integrity:  Goal: Will show no infection signs and symptoms  Description: Will show no infection signs and symptoms  6/13/2021 0745 by Lamont Litten, RN  Outcome: Ongoing  Note: Patient remains free from new signs and symptoms of infection during this shift. Infection prevention measures are in place. Will continue to monitor for alterations in patient condition throughout the shift. 6/13/2021 0328 by Brennon Almeida RN  Outcome: Ongoing  Goal: Absence of new skin breakdown  Description: Absence of new skin breakdown  6/13/2021 0745 by Lamont Litten, RN  Outcome: Ongoing  Note: Patient skin condition and mucus membrane integrity remain unchanged during this shift. Skin breakdown prevention interventions are in place. Will continue to monitor and assess.      6/13/2021 0328 by Dianna Wolf RN  Outcome: Ongoing

## 2021-06-13 NOTE — PROGRESS NOTES
Patient w/ BM this afternoon after suppository. Patient states that he is ready for discharge. This RN attempted to place call to Dr. Kin Redd regarding patient request ([phone]), line busy. Page placed to office ([phone]), but no answer was obtained by on call service. This RN to attempt to call MD again shortly.

## 2021-06-13 NOTE — PLAN OF CARE
Problem: Pain:  Goal: Pain level will decrease  Description: Pain level will decrease  Outcome: Ongoing  Goal: Control of acute pain  Description: Control of acute pain  Outcome: Ongoing  Goal: Control of chronic pain  Description: Control of chronic pain  Outcome: Ongoing   Pain/discomfort being managed with PRN analgesics per MD orders. Pt able to express presence and absence of pain and rate pain appropriately using numerical scale. Problem: Falls - Risk of:  Goal: Will remain free from falls  Description: Will remain free from falls  Outcome: Ongoing  Goal: Absence of physical injury  Description: Absence of physical injury  Outcome: Ongoing   Fall risk assessment completed every shift. All precautions in place. Pt has call light within reach at all times. Room clear of clutter. Pt aware to call for assistance when getting up. Problem: OXYGENATION/RESPIRATORY FUNCTION  Goal: Patient will maintain patent airway  Outcome: Ongoing  Goal: Patient will achieve/maintain normal respiratory rate/effort  Description: Respiratory rate and effort will be within normal limits for the patient  Outcome: Ongoing     Problem: FLUID AND ELECTROLYTE IMBALANCE  Goal: Fluid and electrolyte balance are achieved/maintained  Outcome: Ongoing     Problem: HEMODYNAMIC STATUS  Goal: Patient has stable vital signs and fluid balance  Outcome: Ongoing   VS remain stable. Will monitor. Problem: ACTIVITY INTOLERANCE/IMPAIRED MOBILITY  Goal: Mobility/activity is maintained at optimum level for patient  Outcome: Ongoing     Problem: Skin Integrity:  Goal: Will show no infection signs and symptoms  Description: Will show no infection signs and symptoms  Outcome: Ongoing  Goal: Absence of new skin breakdown  Description: Absence of new skin breakdown  Outcome: Ongoing   Skin assessment completed every shift. Pt encouraged to turn/rotate every 2 hours. Assistance provided if pt unable to do so themselves.

## 2021-06-13 NOTE — PROGRESS NOTES
Pt AAO x4. Sitting up in the chair. States abd pain tolerable at this time. Assessment completed and charted. Pt is passing flatus. No c/o nausea or vomiting. Abd rounded, slightly distended, with hypoactive BS. Lap surgical incisions x4 to abd.  3 with old serosanguinous drainage noted. Pt denies any needs at this time. Call light in reach. Will monitor.

## 2021-06-13 NOTE — PROGRESS NOTES
[facility] Progress Note  6/13/2021 10:22 AM  Subjective:   Admit Date: 6/9/2021      Chief Complaint: I feel ok but my belly is sore --    Interval History: s/p lap simón yest--inflamed GB   He is slowly getting up--No Bm yet--he is focused on his bowels--   Abd sl distended   Getting angry that he is still here, \"I can do this at home\"  Discussed with Dr Crys Keene who would like for him to have a bm prior to dc  Diet: ADULT DIET; Regular; Low Sodium (2 gm); 2000 ml  Medications:   Scheduled Meds:   bisacodyl  10 mg Rectal Once    polyethylene glycol  17 g Oral Daily    ciprofloxacin  400 mg Intravenous Q12H    metroNIDAZOLE  500 mg Intravenous Q8H    spironolactone  25 mg Oral Daily    carvedilol  25 mg Oral BID WC    amiodarone  200 mg Oral Daily    atorvastatin  80 mg Oral Nightly    aspirin  81 mg Oral Once    pantoprazole  40 mg Oral QAM AC    sodium chloride flush  5-40 mL Intravenous 2 times per day     Continuous Infusions:   sodium chloride      sodium chloride Stopped (06/13/21 0907)       Review of Systems -   General ROS: afebrile  Respiratory ROS: positive for - shortness of breath  Cardiovascular ROS: no chest pain  Musculoskeletal ROS:positive for - :joint pain  Neurological ROS: no TIA or stroke symptoms    Objective:   Vitals: BP (!) 129/58   Pulse 56   Temp 97.8 °F (36.6 °C) (Oral)   Resp 17   Ht 5' 6\" (1.676 m)   Wt 236 lb 5.3 oz (107.2 kg)   SpO2 92%   BMI 38.15 kg/m²   General appearance: alert and cooperative with exam, obese,kind but upset, morbidly obese  HEENT: Head: Normocephalic, no lesions, without obvious abnormality.   Neck: no adenopathy, no carotid bruit, no JVD, supple, symmetrical, trachea midline and thyroid not enlarged, symmetric, no tenderness/mass/nodules  Lungs: diminished breath sounds bibasilar  Heart: regular rate and rhythm, S1, S2 normal, no murmur, click, rub or gallop  Abdomen: incisions are dry--abd is distended--decr BS   Extremities: 1+ edema Neurologic: Mental status: Alert, oriented, thought content appropriate    No results displayed because visit has over 200 results. Assessment & Plan:   Principal Problem:    Acute cholecystitis--s/p lap simón--slow post-op--white count up more today-will start diet-hopefully will have bm and go home later today  Will need to discuss with surgery since wbc up higher   Active Problems:  Hyperglycemia, no prior diagnosis of diabetes but his last two sugars have been quite high  Plan : start lantus and humalog    SVT (supraventricular tachycardia),paroxysmal.    Atrial fibrillation (HCC)--stable     HTN (hypertension)--Continue current therapy      Hyperlipidemia    Chronic diastolic CHF (congestive heart failure) (HCC)--none on exam     Fever    Continuous severe abdominal pain    Thrombocytopenia (HCC)--improving--now over 100k  Resolved Problems:    * No resolved hospital problems.  *  Freddie activity--start PT--follow platelets       Steve Red MD

## 2021-06-14 VITALS
RESPIRATION RATE: 20 BRPM | BODY MASS INDEX: 37.59 KG/M2 | DIASTOLIC BLOOD PRESSURE: 79 MMHG | WEIGHT: 233.91 LBS | HEIGHT: 66 IN | TEMPERATURE: 98 F | HEART RATE: 64 BPM | OXYGEN SATURATION: 91 % | SYSTOLIC BLOOD PRESSURE: 148 MMHG

## 2021-06-14 PROBLEM — E11.69 DIABETES MELLITUS TYPE 2 IN OBESE (HCC): Status: ACTIVE | Noted: 2021-06-14

## 2021-06-14 PROBLEM — E66.9 DIABETES MELLITUS TYPE 2 IN OBESE (HCC): Status: ACTIVE | Noted: 2021-06-14

## 2021-06-14 LAB
A/G RATIO: 1.1 (ref 1.1–2.2)
ALBUMIN SERPL-MCNC: 2.1 G/DL (ref 3.1–4.9)
ALBUMIN SERPL-MCNC: 3.2 G/DL (ref 3.4–5)
ALP BLD-CCNC: 93 U/L (ref 40–129)
ALPHA-1-GLOBULIN: 0.5 G/DL (ref 0.2–0.4)
ALPHA-2-GLOBULIN: 1.1 G/DL (ref 0.4–1.1)
ALT SERPL-CCNC: 45 U/L (ref 10–40)
ANION GAP SERPL CALCULATED.3IONS-SCNC: 11 MMOL/L (ref 3–16)
AST SERPL-CCNC: 31 U/L (ref 15–37)
BETA GLOBULIN: 1 G/DL (ref 0.9–1.6)
BILIRUB SERPL-MCNC: 0.6 MG/DL (ref 0–1)
BUN BLDV-MCNC: 22 MG/DL (ref 7–20)
CALCIUM SERPL-MCNC: 8.2 MG/DL (ref 8.3–10.6)
CHLORIDE BLD-SCNC: 101 MMOL/L (ref 99–110)
CO2: 24 MMOL/L (ref 21–32)
CREAT SERPL-MCNC: 1.1 MG/DL (ref 0.8–1.3)
GAMMA GLOBULIN: 0.7 G/DL (ref 0.6–1.8)
GFR AFRICAN AMERICAN: >60
GFR NON-AFRICAN AMERICAN: >60
GLOBULIN: 3 G/DL
GLUCOSE BLD-MCNC: 139 MG/DL (ref 70–99)
GLUCOSE BLD-MCNC: 156 MG/DL (ref 70–99)
GLUCOSE BLD-MCNC: 177 MG/DL (ref 70–99)
HCT VFR BLD CALC: 42 % (ref 40.5–52.5)
HEMOGLOBIN: 14.1 G/DL (ref 13.5–17.5)
MCH RBC QN AUTO: 31.2 PG (ref 26–34)
MCHC RBC AUTO-ENTMCNC: 33.6 G/DL (ref 31–36)
MCV RBC AUTO: 93 FL (ref 80–100)
PDW BLD-RTO: 14.1 % (ref 12.4–15.4)
PERFORMED ON: ABNORMAL
PERFORMED ON: ABNORMAL
PLATELET # BLD: 151 K/UL (ref 135–450)
PMV BLD AUTO: 9.2 FL (ref 5–10.5)
POTASSIUM SERPL-SCNC: 4.3 MMOL/L (ref 3.5–5.1)
RBC # BLD: 4.51 M/UL (ref 4.2–5.9)
SODIUM BLD-SCNC: 136 MMOL/L (ref 136–145)
SPE/IFE INTERPRETATION: NORMAL
TOTAL PROTEIN: 5.4 G/DL (ref 6.4–8.2)
TOTAL PROTEIN: 6.2 G/DL (ref 6.4–8.2)
WBC # BLD: 12.1 K/UL (ref 4–11)

## 2021-06-14 PROCEDURE — 85027 COMPLETE CBC AUTOMATED: CPT

## 2021-06-14 PROCEDURE — 99024 POSTOP FOLLOW-UP VISIT: CPT | Performed by: PHYSICIAN ASSISTANT

## 2021-06-14 PROCEDURE — APPNB30 APP NON BILLABLE TIME 0-30 MINS: Performed by: PHYSICIAN ASSISTANT

## 2021-06-14 PROCEDURE — APPSS15 APP SPLIT SHARED TIME 0-15 MINUTES: Performed by: PHYSICIAN ASSISTANT

## 2021-06-14 PROCEDURE — 6360000002 HC RX W HCPCS: Performed by: INTERNAL MEDICINE

## 2021-06-14 PROCEDURE — 80053 COMPREHEN METABOLIC PANEL: CPT

## 2021-06-14 PROCEDURE — 2500000003 HC RX 250 WO HCPCS: Performed by: INTERNAL MEDICINE

## 2021-06-14 PROCEDURE — 36415 COLL VENOUS BLD VENIPUNCTURE: CPT

## 2021-06-14 PROCEDURE — 6370000000 HC RX 637 (ALT 250 FOR IP): Performed by: INTERNAL MEDICINE

## 2021-06-14 PROCEDURE — 6360000002 HC RX W HCPCS: Performed by: SURGERY

## 2021-06-14 PROCEDURE — 99239 HOSP IP/OBS DSCHRG MGMT >30: CPT | Performed by: INTERNAL MEDICINE

## 2021-06-14 RX ORDER — POLYETHYLENE GLYCOL 3350 17 G/17G
17 POWDER, FOR SOLUTION ORAL DAILY
Qty: 527 G | Refills: 1 | COMMUNITY
Start: 2021-06-14 | End: 2021-07-14

## 2021-06-14 RX ORDER — METRONIDAZOLE 500 MG/1
500 TABLET ORAL 3 TIMES DAILY
Qty: 1 TABLET | Refills: 0 | Status: SHIPPED
Start: 2021-06-14 | End: 2021-06-17

## 2021-06-14 RX ORDER — CIPROFLOXACIN 500 MG/1
500 TABLET, FILM COATED ORAL 2 TIMES DAILY
Qty: 1 TABLET | Refills: 0 | Status: SHIPPED
Start: 2021-06-14 | End: 2021-06-17

## 2021-06-14 RX ADMIN — AMIODARONE HYDROCHLORIDE 200 MG: 200 TABLET ORAL at 09:42

## 2021-06-14 RX ADMIN — METRONIDAZOLE 500 MG: 500 INJECTION, SOLUTION INTRAVENOUS at 02:23

## 2021-06-14 RX ADMIN — SPIRONOLACTONE 25 MG: 25 TABLET ORAL at 09:42

## 2021-06-14 RX ADMIN — PANTOPRAZOLE SODIUM 40 MG: 40 TABLET, DELAYED RELEASE ORAL at 07:24

## 2021-06-14 RX ADMIN — MORPHINE SULFATE 2 MG: 2 INJECTION, SOLUTION INTRAMUSCULAR; INTRAVENOUS at 02:23

## 2021-06-14 RX ADMIN — CIPROFLOXACIN 400 MG: 2 INJECTION, SOLUTION INTRAVENOUS at 07:25

## 2021-06-14 RX ADMIN — TRAMADOL HYDROCHLORIDE 50 MG: 50 TABLET, FILM COATED ORAL at 07:24

## 2021-06-14 RX ADMIN — CARVEDILOL 25 MG: 25 TABLET, FILM COATED ORAL at 09:42

## 2021-06-14 RX ADMIN — METRONIDAZOLE 500 MG: 500 INJECTION, SOLUTION INTRAVENOUS at 09:43

## 2021-06-14 RX ADMIN — INSULIN LISPRO 3 UNITS: 100 INJECTION, SOLUTION INTRAVENOUS; SUBCUTANEOUS at 11:50

## 2021-06-14 ASSESSMENT — ENCOUNTER SYMPTOMS
TROUBLE SWALLOWING: 0
COUGH: 0
CHEST TIGHTNESS: 0
SHORTNESS OF BREATH: 0
NAUSEA: 0
DIARRHEA: 0
ABDOMINAL PAIN: 1
CONSTIPATION: 0
WHEEZING: 0

## 2021-06-14 ASSESSMENT — PAIN DESCRIPTION - DESCRIPTORS
DESCRIPTORS: CRAMPING

## 2021-06-14 ASSESSMENT — PAIN DESCRIPTION - FREQUENCY
FREQUENCY: CONTINUOUS
FREQUENCY: INTERMITTENT
FREQUENCY: INTERMITTENT

## 2021-06-14 ASSESSMENT — PAIN DESCRIPTION - PAIN TYPE
TYPE: ACUTE PAIN;SURGICAL PAIN

## 2021-06-14 ASSESSMENT — PAIN - FUNCTIONAL ASSESSMENT
PAIN_FUNCTIONAL_ASSESSMENT: PREVENTS OR INTERFERES SOME ACTIVE ACTIVITIES AND ADLS

## 2021-06-14 ASSESSMENT — PAIN DESCRIPTION - LOCATION
LOCATION: ABDOMEN

## 2021-06-14 ASSESSMENT — PAIN DESCRIPTION - ONSET
ONSET: ON-GOING

## 2021-06-14 ASSESSMENT — PAIN DESCRIPTION - PROGRESSION
CLINICAL_PROGRESSION: GRADUALLY WORSENING
CLINICAL_PROGRESSION: NOT CHANGED
CLINICAL_PROGRESSION: GRADUALLY WORSENING

## 2021-06-14 ASSESSMENT — PAIN SCALES - GENERAL
PAINLEVEL_OUTOF10: 6
PAINLEVEL_OUTOF10: 3
PAINLEVEL_OUTOF10: 0
PAINLEVEL_OUTOF10: 4

## 2021-06-14 NOTE — CARE COORDINATION
Spoke with patient and spouse and confirmed dc plan to return home; denied needs at this time. Provided patient with IMM letter.     Electronically signed by SAURAV Cuenca, JADA, Case Management on 6/14/2021 at 11:33 AM  [facility] [phone]

## 2021-06-14 NOTE — DISCHARGE SUMMARY
[address] [facility]                               DISCHARGE SUMMARY    PATIENT NAME: Igor Lao                   :        1940  MED REC NO:   [id number]                          ROOM:       3101  ACCOUNT NO:   [de-identified]                           ADMIT DATE: 2021  PROVIDER:     Zeus Lei MD                  DISCHARGE DATE:        DISCHARGE DATE:  2021    FINAL DIAGNOSES:  1. Acute cholecystitis status post cholecystectomy. 2.  Thrombocytopenia secondary to infection. 3.  Diabetes mellitus in a patient with prior hyperglycemia, currently  elevated blood sugars due to infection. 4.  History of atrial fibrillation and SVT single episodes,  uncontrolled. 5.  Hypertension. 6.  Hyperlipidemia. 7.  Chronic diastolic heart failure, well compensated. 8.  History of sleep apnea. HISTORY OF PRESENT ILLNESS:  This patient with the above medical  problems was admitted with abdominal pain and it is on the right side of  the abdomen. He was seen in the emergency room and workup did not  reveal any significant abnormalities but he was sent home on Cipro and  Flagyl. But the pain got worse the next day and there was significant  tenderness in the right  midabdomen. Bowel sounds were present but  abdomen was slightly distended. He had crackles at lung bases, more on  the right side. He developed fever of 100. Because of this, he was  admitted to the hospital for further evaluation. He was started on IV  Cipro and Flagyl. His white cell count went up to 20,000. Sodium was  129 to 133, 134, it remained stable thereafter. His BUN 22, creatinine  1.3 and it remained stable throughout the hospital, and at the time of  discharge, it is 22 and 1.1. His CRP was elevated to 264, sed rate was  elevated up to 40.   A repeat CAT scan was showing the patient to have  findings suggestive of acute

## 2021-06-14 NOTE — PLAN OF CARE
Problem: Pain:  Goal: Pain level will decrease  Description: Pain level will decrease  Outcome: Ongoing  Goal: Control of acute pain  Description: Control of acute pain  Outcome: Ongoing  Goal: Control of chronic pain  Description: Control of chronic pain  Outcome: Ongoing   Pain/discomfort being managed with PRN analgesics per MD orders. Pt able to express presence and absence of pain and rate pain appropriately using numerical scale. Problem: Falls - Risk of:  Goal: Will remain free from falls  Description: Will remain free from falls  Outcome: Ongoing  Goal: Absence of physical injury  Description: Absence of physical injury  Outcome: Ongoing   Fall risk assessment completed every shift. All precautions in place. Pt has call light within reach at all times. Room clear of clutter. Pt aware to call for assistance when getting up.      Problem: OXYGENATION/RESPIRATORY FUNCTION  Goal: Patient will maintain patent airway  Outcome: Ongoing  Goal: Patient will achieve/maintain normal respiratory rate/effort  Description: Respiratory rate and effort will be within normal limits for the patient  Outcome: Ongoing     Problem: HEMODYNAMIC STATUS  Goal: Patient has stable vital signs and fluid balance  Outcome: Ongoing     Problem: FLUID AND ELECTROLYTE IMBALANCE  Goal: Fluid and electrolyte balance are achieved/maintained  Outcome: Ongoing     Problem: ACTIVITY INTOLERANCE/IMPAIRED MOBILITY  Goal: Mobility/activity is maintained at optimum level for patient  Outcome: Ongoing     Problem: Skin Integrity:  Goal: Will show no infection signs and symptoms  Description: Will show no infection signs and symptoms  Outcome: Ongoing  Goal: Absence of new skin breakdown  Description: Absence of new skin breakdown  Outcome: Ongoing

## 2021-06-14 NOTE — PROGRESS NOTES
[facility] [phone]                                     Daily Progress Note                                                         Pt Name: Sukumar Boyce  Medical Record Number: [id number]  Date of Birth 1940   Today's Date: 6/14/2021  No chief complaint on file. ASSESSMENT/PLAN  Severe acute cholecystitis  6/11/2021 laparoscopic cholecystectomy with cholangiogram     -Platelets improving conservatively  -Pain controlled  -+BM yesterday  -Denies any nausea or emesis  -OK to D/C home from a general surgery standpoint. Follow up with Dr. Yann Rhoades in 1-2 weeks. SUBJECTIVE  Miguelanastasia Cobos has improved from yesterday. Pain is controlled. He has no nausea and no vomiting. He has passed flatus and has  had a bowel movement. He is tolerating regular PO     OBJECTIVE  VITALS:  height is 5' 6\" (1.676 m) and weight is 233 lb 14.5 oz (106.1 kg). His oral temperature is 98 °F (36.7 °C). His blood pressure is 148/79 (abnormal) and his pulse is 64. His respiration is 20 and oxygen saturation is 91%. INTAKE/OUTPUT:      Intake/Output Summary (Last 24 hours) at 6/14/2021 1011  Last data filed at 6/14/2021 1010  Gross per 24 hour   Intake 700 ml   Output --   Net 700 ml     GENERAL: alert, cooperative, no distress  LUNGS: clear to ausculation, without wheezes, rales or rhonci  HEART: normal rate and regular rhythm  ABDOMEN: Soft, appropriately tender incisions c/d/I.   EXTREMITY: no cyanosis, clubbing or edema    I/O last 3 completed shifts:   In: 880 [P.O.:780; IV Piggyback:100]  Out: -       LABS  Recent Labs     06/13/21  0426 06/14/21  0511   WBC  --  12.1*   HGB  --  14.1   HCT  --  42.0   PLT  --  151   * 136   K 4.4 4.3    101   CO2 22 24   BUN 27* 22*   CREATININE 1.1 1.1   MG 2.40  --    CALCIUM 8.0* 8.2*   AST  --  31   ALT  --  45*   BILITOT  --  0.6       Electronically signed by Royce Duron PA-C on 6/14/2021 at 10:11 AM

## 2021-06-14 NOTE — PROGRESS NOTES
Hematology Oncology Daily Progress Note    Admit Date: 6/9/2021  Hospital day several    Subjective:     Patient has complaints of mild to mod fatigue--denies sob/cp--no bleeding. Medication side effects: none    Scheduled Meds:   insulin lispro  0-18 Units Subcutaneous TID WC    insulin lispro  0-9 Units Subcutaneous Nightly    insulin glargine  10 Units Subcutaneous Nightly    polyethylene glycol  17 g Oral Daily    ciprofloxacin  400 mg Intravenous Q12H    metroNIDAZOLE  500 mg Intravenous Q8H    spironolactone  25 mg Oral Daily    carvedilol  25 mg Oral BID WC    amiodarone  200 mg Oral Daily    atorvastatin  80 mg Oral Nightly    aspirin  81 mg Oral Once    pantoprazole  40 mg Oral QAM AC    sodium chloride flush  5-40 mL Intravenous 2 times per day     Continuous Infusions:   dextrose      sodium chloride      sodium chloride Stopped (06/13/21 0907)     PRN Meds:glucose, dextrose, glucagon (rDNA), dextrose, morphine **OR** morphine, iopamidol, traMADol, sodium chloride flush, sodium chloride, ondansetron **OR** ondansetron, polyethylene glycol, acetaminophen **OR** acetaminophen    Review of Systems  Pertinent items are noted in HPI. REVIEW OF SYSTEMS:         · Constitutional: Denies fever, sweats, weight loss     · Eyes: No visual changes or diplopia. No scleral icterus. · ENT: No Headaches, hearing loss or vertigo. No mouth sores or sore throat. · Cardiovascular: No chest pain, dyspnea on exertion, palpitations or loss of consciousness. · Respiratory: No cough or wheezing, no sputum production. No hemoptysis. .    · Gastrointestinal: No abdominal pain, appetite loss, blood in stools. No change in bowel habits. · Genitourinary: No dysuria, trouble voiding, or hematuria. · Musculoskeletal:  Generalized weakness. No joint complaints. · Integumentary: No rash or pruritis. · Neurological: No headache, diplopia. No change in gait, balance, or coordination. No paresthesias.   · Endocrine: No temperature intolerance. No excessive thirst, fluid intake, or urination. · Hematologic/Lymphatic: No abnormal bruising or ecchymoses, blood clots or swollen lymph nodes. · Allergic/Immunologic: No nasal congestion or hives. ·     Objective:     Patient Vitals for the past 8 hrs:   Weight   06/14/21 0633 233 lb 14.5 oz (106.1 kg)     I/O last 3 completed shifts: In: 0 [P.O.:780; IV Piggyback:100]  Out: -   No intake/output data recorded. BP (!) 167/73   Pulse 65   Temp 97.4 °F (36.3 °C) (Oral)   Resp 15   Ht 5' 6\" (1.676 m)   Wt 233 lb 14.5 oz (106.1 kg)   SpO2 92%   BMI 37.75 kg/m²     General Appearance:    Alert, cooperative, no distress, appears stated age   Head:    Normocephalic, without obvious abnormality, atraumatic   Eyes:    PERRL, conjunctiva/corneas clear, EOM's intact, fundi     benign, both eyes        Ears:    Normal TM's and external ear canals, both ears   Nose:   Nares normal, septum midline, mucosa normal, no drainage    or sinus tenderness   Throat:   Lips, mucosa, and tongue normal; teeth and gums normal   Neck:   Supple, symmetrical, trachea midline, no adenopathy;        thyroid:  No enlargement/tenderness/nodules; no carotid    bruit or JVD   Back:     Symmetric, no curvature, ROM normal, no CVA tenderness   Lungs:     Clear to auscultation bilaterally, respirations unlabored   Chest wall:    No tenderness or deformity   Heart:    Regular rate and rhythm, S1 and S2 normal, no murmur, rub   or gallop   Abdomen:     Soft, non-tender, bowel sounds active all four quadrants,     no masses, no organomegaly           Extremities:   Extremities normal, atraumatic, no cyanosis or edema   Pulses:   2+ and symmetric all extremities   Skin:   Skin color, texture, turgor normal, no rashes or lesions   Lymph nodes:   Cervical, supraclavicular, and axillary nodes normal   Neurologic:   CNII-XII intact.  Normal strength, sensation and reflexes       throughout         Data Review  CBC: Lab Results   Component Value Date    WBC 12.1 06/14/2021    RBC 4.51 06/14/2021       Assessment:     Principal Problem:    Acute cholecystitis  Active Problems:    SVT (supraventricular tachycardia),paroxysmal.    Atrial fibrillation (HCC)    HTN (hypertension)    Hyperlipidemia    Chronic diastolic CHF (congestive heart failure) (HCC)    Fever    Continuous severe abdominal pain    Thrombocytopenia (HCC)  Resolved Problems:    * No resolved hospital problems. *      Plan:     1. Thrombocytopenia. This has resolved. He has no signs of bleeding. This was likely due to increased consumption due to infection. His lab work-up was negative. His DIC and HIT screen were negative. I will sign off.         Electronically signed by Marlen Easley MD on 6/14/2021 at 8:29 AM

## 2021-06-14 NOTE — PROGRESS NOTES
Pt AAO x4. C/o pain in abd 4/10 on pain scale. Medicated with PRN Tramadol. Assessment completed and charted. Abdomen still round and slightly distended. Active BS. Pt did have a BM today and is passing flatus. 4 lap surgical incisions to abd with small amount of old serosanguinous drainage. No c/o nausea or vomiting.  tonight. Insulin given per order. See MAR. Denies any needs. Call light in reach. Will monitor.

## 2021-06-14 NOTE — CARE COORDINATION
[facility]  Diabetes Education   Progress Note       NAME:  Ramon Avila  MEDICAL RECORD NUMBER:  [id number]  AGE: 68 y.o. GENDER: male  : 1940  TODAY'S DATE:  2021    Subjective   Reason for Diabetic Education Evaluation and Assessment: new onset diabetes     Vincenzo and his wife agrees to meet with me for diabetes education. Visit Type: evaluation      Ramon Avila is a 68 y.o. male referred by:     [] Physician  [x] Nursing  [] Chart Review   [] Other:     PAST MEDICAL HISTORY        Diagnosis Date    Atrial fibrillation ([facility]     CHF (congestive heart failure) ([facility]     Diverticulosis     Hyperlipidemia     Hypertension     Obesity     Unspecified sleep apnea        PAST SURGICAL HISTORY    Past Surgical History:   Procedure Laterality Date    CHOLECYSTECTOMY, LAPAROSCOPIC N/A 2021    LAPAROSCOPIC CHOLECYSTECTOMY WITH CHOLANGIOGRAM performed by Rosaura Chakraborty MD at [facility]  3/2007.;;    polypectomy-DR. Garza.  COLONOSCOPY      DR. sAhraf-polypectomy x 4    COLONOSCOPY  2017    Dr Ashraf-polypectomy x -tubular adenomas    COLONOSCOPY N/A 2020    COLONOSCOPY POLYPECTOMY SNARE/COLD BIOPSY performed by Kareem Balbuena MD at [facility]    Family History   Problem Relation Age of Onset    Cancer Father         prostate    Diabetes Sister        SOCIAL HISTORY    Social History     Tobacco Use    Smoking status: Former Smoker     Packs/day: 0.25     Years: 5.00     Pack years: 1.25     Quit date: 10/1/1990     Years since quittin.7    Smokeless tobacco: Never Used    Tobacco comment: quit 25 years ago   Vaping Use    Vaping Use: Never used   Substance Use Topics    Alcohol use:  Yes     Alcohol/week: 2.0 standard drinks     Types: 1 Glasses of wine, 1 Cans of beer per week    Drug use: No       ALLERGIES    No Known Allergies    MEDICATIONS     insulin lispro  0-18 Units Subcutaneous TID WC    insulin lispro  0-9 Units Subcutaneous Nightly    insulin glargine  10 Units Subcutaneous Nightly    polyethylene glycol  17 g Oral Daily    ciprofloxacin  400 mg Intravenous Q12H    metroNIDAZOLE  500 mg Intravenous Q8H    spironolactone  25 mg Oral Daily    carvedilol  25 mg Oral BID WC    amiodarone  200 mg Oral Daily    atorvastatin  80 mg Oral Nightly    aspirin  81 mg Oral Once    pantoprazole  40 mg Oral QAM AC    sodium chloride flush  5-40 mL Intravenous 2 times per day       Objective        Patient Active Problem List   Diagnosis Code    Encounter for long-term (current) use of other medications Z79.899    Osteoarthritis of shoulder M19.019    SVT (supraventricular tachycardia),paroxysmal. I47.1    Atrial fibrillation ([address] I48.91    Long term current use of anticoagulant therapy Z79.01    Sleep apnea-on CPAP. G47.30    HTN (hypertension) I10    Hyperlipidemia E78.5    Lumbar spine strain S39.012A    Bunion of great toe of right foot M21.611    Chronic diastolic CHF (congestive heart failure) ([facility]) I50.32    Quadriceps tendinitis-right M76.899    Closed nondisplaced fracture of left pubis with routine healing S32.502D    Hordeolum externum of right upper eyelid H00.011    Seasonal allergic rhinitis J30.2    Allergic contact dermatitis due to plants, except food L23.7    Vestibular dizziness R42    Plantar fasciitis, right M72.2    Morbid obesity with BMI of 40.0-44.9, adult ([facility]) E66.01, Z68.41    Generalized abdominal pain R10.84    Fever R50.9    Shortness of breath R06.02    Continuous severe abdominal pain R10.9    Acute cholecystitis K81.0    Thrombocytopenia ([facility]) D69.6    Diabetes mellitus type 2 in obese ([facility]) E11.69, E66.9        BP (!) 148/79   Pulse 64   Temp 98 °F (36.7 °C) (Oral)   Resp 20   Ht 5' 6\" (1.676 m)   Wt 233 lb 14.5 oz (106.1 kg)   SpO2 91%   BMI 37.75 kg/m²     HgBA1c:    Lab Results   Component Value Date LABA1C 6.8 06/13/2021       Recent Labs     06/13/21  1137 06/13/21  1611 06/13/21  2100 06/14/21  0614   POCGLU 293* 286* 211* 139*       BUN/Creatinine:    Lab Results   Component Value Date    BUN 22 06/14/2021    CREATININE 1.1 06/14/2021       Assessment        Diabetes Management and Education    Does the patient have a Primary Care Physician? Yes, Blaise Herrera MD       Does the patient require new medication instruction? Yes  Reviewed Metformin dose timing, action and sie effects. Level of patient/caregiver understanding able to:       [x] Verbalized Understanding   [] Demonstrated Understanding       [x] Teach Back       [] Needs Reinforcement     []  Other:        Does the patient/caregiver follow a Meal Plan? No:  Drinks mostly water and diet drinks. Recommend making water, unsweetened tea or coffee primary drinks. Reviewed importance of eating three meals per day and plate method for consistent carb intake. Identified common carbs. Level of patient/caregiver understanding able to:       [x] Verbalized Understanding   [] Demonstrated Understanding       [] Teach Back       [] Needs Reinforcement     []  Other:                    Does the patient/caregiver understand S/S of Hyperglycemia? No:     Reviewed symptoms, prevention and treatment. Level of patient/caregiver understanding able to:        [x] Verbalized Understanding   [] Demonstrated Understanding       [] Teach Back       [] Needs Reinforcement     []  Other:           Plan        Ongoing diabetes education and blood glucose monitoring.         The following educational and support materials were provided:  · My contact information     · The Diabetes Education Program:  Planning Healthy Meals   · Academy of Nutrition and Dietetics handout - Carbohydrate Counting for People with Diabetes                                              Teaching Time Diabetes Education:  20 minutes     Electronically signed by Josep Ramos Nya Kumari on 6/14/2021 at 10:00 AM

## 2021-06-14 NOTE — PLAN OF CARE
Problem: Pain:  Goal: Pain level will decrease  Description: Pain level will decrease  6/14/2021 1000 by Neena Gonsalves RN  Outcome: Ongoing  Note: Pt assessed for pain. Pt in pain and assessed with 0-10 pain rating scale. Pt given prescribed analgesic for pain. (See eMar) Pt satisfied with pain relief thus far. Will reassess and continue to monitor. 6/14/2021 0420 by Shelva Baumgarten, RN  Outcome: Ongoing  Goal: Control of acute pain  Description: Control of acute pain  6/14/2021 1000 by Neena Gonsalves RN  Outcome: Ongoing  Note: Pt assessed for pain. Pt in pain and assessed with 0-10 pain rating scale. Pt given prescribed analgesic for pain. (See eMar) Pt satisfied with pain relief thus far. Will reassess and continue to monitor. 6/14/2021 0420 by Shelva Baumgarten, RN  Outcome: Ongoing  Goal: Control of chronic pain  Description: Control of chronic pain  6/14/2021 1000 by Neena Gonsalves RN  Outcome: Ongoing  Note: Pt assessed for pain. Pt in pain and assessed with 0-10 pain rating scale. Pt given prescribed analgesic for pain. (See eMar) Pt satisfied with pain relief thus far. Will reassess and continue to monitor. 6/14/2021 0420 by Shelva Baumgarten, RN  Outcome: Ongoing     Problem: Falls - Risk of:  Goal: Will remain free from falls  Description: Will remain free from falls  6/14/2021 1000 by Neena Gonsalves RN  Outcome: Ongoing  Note: Fall risk assessment completed. Fall precautions in place. Call light within reach. Pt educated on calling for assistance before getting up. Walkway free of clutter. Will continue to monitor. 6/14/2021 0420 by Shelva Baumgarten, RN  Outcome: Ongoing  Goal: Absence of physical injury  Description: Absence of physical injury  6/14/2021 1000 by Neena Gonsalves RN  Outcome: Ongoing  Note: Patient will remain free from physical injury. Safety precautions in place. Will monitor and assess.   6/14/2021 0420 by Shelva Baumgarten, RN  Outcome: stable. Will monitor. 6/14/2021 0420 by Matthew Gore RN  Outcome: Ongoing  Goal: Absence of new skin breakdown  Description: Absence of new skin breakdown  6/14/2021 1000 by Pedrito Fields RN  Outcome: Ongoing  Note: Patient will remain free from new skin breakdown. Will monitor and assess.   6/14/2021 0420 by Matthew Gore RN  Outcome: Ongoing

## 2021-06-14 NOTE — PROGRESS NOTES
Patient is laying in bed and is alert and oriented x4. Patients wife is at bedside. Patient educated about diabetic diet by dietician. Head to toe assessment is done and all morning meds were given. Discharge order is placed and patient is aware. Will continue to monitor and assess.   Electronically signed by Kiana Harman RN on 6/14/2021 at 10:06 AM

## 2021-06-14 NOTE — PROGRESS NOTES
IM Progress Note      Subjective: The patient is a 68 y.o. male patient ,s/p cholecystectomy. pain is better and ha sno nausea or vomiting and had bm yesterday  Has no cp gu symptoms and ha sno orthopnea or pnd  Has been getting around   Did not EAT BF THIS AM AS HE DID NOT LIKE THE FOOD GIVEN  Review of Systems:  Review of Systems   Constitutional: Negative for activity change, appetite change, chills, fever and unexpected weight change. HENT: Negative for trouble swallowing. Respiratory: Negative for cough, chest tightness, shortness of breath and wheezing. Cardiovascular: Negative for chest pain, palpitations and leg swelling. Gastrointestinal: Positive for abdominal pain (but less). Negative for constipation, diarrhea and nausea. Genitourinary: Negative. Neurological: Negative for dizziness, light-headedness and headaches. Objective:    BP (!) 148/79   Pulse 64   Temp 98 °F (36.7 °C)   Resp 20   Ht 5' 6\" (1.676 m)   Wt 233 lb 14.5 oz (106.1 kg)   SpO2 91%   BMI 37.75 kg/m²     Intake/Output Summary (Last 24 hours) at 6/14/2021 0908  Last data filed at 6/14/2021 6657  Gross per 24 hour   Intake 580 ml   Output --   Net 580 ml           Physical Exam  Vitals and nursing note reviewed. Constitutional:       General: He is not in acute distress. Eyes:      General: No scleral icterus. Extraocular Movements: Extraocular movements intact. Conjunctiva/sclera: Conjunctivae normal.      Pupils: Pupils are equal, round, and reactive to light. Neck:      Thyroid: No thyromegaly. Vascular: No carotid bruit or JVD. Cardiovascular:      Rate and Rhythm: Normal rate and regular rhythm. Pulses: Normal pulses. Heart sounds: Normal heart sounds. No murmur heard. No gallop. Pulmonary:      Breath sounds: Normal breath sounds. No wheezing, rhonchi or rales. Abdominal:      General: Bowel sounds are normal. There is no distension. Palpations:  There is no splenomegaly or mass. Tenderness: There is abdominal tenderness (incisional tenderne+). Hernia: No hernia is present. Musculoskeletal:      Right lower leg: No edema. Left lower leg: No edema. Lymphadenopathy:      Cervical: No cervical adenopathy. Neurological:      Mental Status: He is alert and oriented to person, place, and time. CBC:   Recent Labs     06/13/21 0427 06/14/21  0511   WBC 14.2* 12.1*   HGB 12.8* 14.1   * 151     BMP:    Recent Labs     06/13/21 0426 06/14/21  0511   * 136   K 4.4 4.3    101   CO2 22 24   BUN 27* 22*   CREATININE 1.1 1.1   GLUCOSE 278* 156*     Hepatic:   Recent Labs     06/14/21  0511   AST 31   ALT 45*   BILITOT 0.6   ALKPHOS 93        Glucose:    Recent Labs     06/12/21 0514 06/13/21 0426 06/14/21  0511   GLUCOSE 342* 278* 156*        Blood sugars have been high since admission -as high as 300s and mostly 240 250 in afternoon and 100s in am and A1C is 6.8      Assessment/Plan:    Active Hospital Problems    Diagnosis Date Noted    Diabetes mellitus type 2 in obese ([address] [E11.69, E66.9]-recent elevation are due to infection in a prediabetic and obesity-discussed with him and hsi wife and start diabetic education and start on metformin 06/14/2021    [facility]rombocytopenia ([facility] [D69.6]-resolved and w.u negative for CORKY and ITP  06/11/2021    Acute cholecystitis [K81.0]-s/p cholecystectomy      06/09/2021  06/09/2021    Chronic diastolic CHF (congestive heart failure) ([facility] [I50.32]-well compensated 12/21/2015    HTN (hypertension) [I10]-controlled 10/07/2013    Hyperlipidemia [E78.5]-no change  10/07/2013    Atrial fibrillation (HCC) [I48.91]-in sinus rhythm 10/25/2011    SVT (supraventricular tachycardia),paroxysmal. [W67. 1]-no recurrences 05/11/2011     Discharge home today  Det meds activity discussed  See in office in 2 weeks and follow up with surgery as instructed      Electronically signed by Enrrique Cruz MD on 6/14/2021 at 9:08 AM

## 2021-06-15 ENCOUNTER — CARE COORDINATION (OUTPATIENT)
Dept: CASE MANAGEMENT | Age: 81
End: 2021-06-15

## 2021-06-15 DIAGNOSIS — K81.0 ACUTE CHOLECYSTITIS: Primary | ICD-10-CM

## 2021-06-15 PROCEDURE — 1111F DSCHRG MED/CURRENT MED MERGE: CPT

## 2021-06-21 ENCOUNTER — OFFICE VISIT (OUTPATIENT)
Dept: SURGERY | Age: 81
End: 2021-06-21

## 2021-06-21 DIAGNOSIS — K81.0 ACUTE CHOLECYSTITIS: Primary | ICD-10-CM

## 2021-06-21 PROCEDURE — 99024 POSTOP FOLLOW-UP VISIT: CPT | Performed by: SURGERY

## 2021-06-21 NOTE — PROGRESS NOTES
Arden Schrader (:  1940) is a 68 y.o. male,Established patient, here for evaluation of the following chief complaint(s):  Post-Op Check (Pt is here today for a postop visit. )         ASSESSMENT/PLAN:   Diagnosis Orders   1. Acute cholecystitis       Follow up in two weeks         Subjective   SUBJECTIVE/OBJECTIVE:  HPI  Patient presents s/p Laparoscopic Cholecystectomy with Cholangiogram. Patient is two weeks post op. Pain level is minor. Incision appearance: well healed x 4. Post op complications: none. Some swelling at umbilical incision. Reports he has limited appetite but otherwise OK. Pathology report reviewed with patient and showed cholecystitis. Follow up in two weeks. Review of Systems       Objective   Physical Exam       Electronically signed by Ana Laura Blanchard MD on 2021 at 2:19 PM        An electronic signature was used to authenticate this note.     --Ana Laura Blanchard MD

## 2021-06-29 ENCOUNTER — OFFICE VISIT (OUTPATIENT)
Dept: INTERNAL MEDICINE CLINIC | Age: 81
End: 2021-06-29
Payer: MEDICARE

## 2021-06-29 VITALS
SYSTOLIC BLOOD PRESSURE: 110 MMHG | BODY MASS INDEX: 34.91 KG/M2 | HEART RATE: 60 BPM | WEIGHT: 217.2 LBS | DIASTOLIC BLOOD PRESSURE: 80 MMHG | OXYGEN SATURATION: 95 % | HEIGHT: 66 IN

## 2021-06-29 DIAGNOSIS — I50.32 CHRONIC DIASTOLIC CHF (CONGESTIVE HEART FAILURE) (HCC): ICD-10-CM

## 2021-06-29 DIAGNOSIS — I48.0 PAROXYSMAL ATRIAL FIBRILLATION (HCC): ICD-10-CM

## 2021-06-29 DIAGNOSIS — K81.0 ACUTE CHOLECYSTITIS: Primary | ICD-10-CM

## 2021-06-29 DIAGNOSIS — E11.69 DIABETES MELLITUS TYPE 2 IN OBESE (HCC): ICD-10-CM

## 2021-06-29 DIAGNOSIS — E66.9 DIABETES MELLITUS TYPE 2 IN OBESE (HCC): ICD-10-CM

## 2021-06-29 DIAGNOSIS — I10 ESSENTIAL HYPERTENSION: ICD-10-CM

## 2021-06-29 LAB
A/G RATIO: 1.1 (ref 1.1–2.2)
ALBUMIN SERPL-MCNC: 3.4 G/DL (ref 3.4–5)
ALP BLD-CCNC: 77 U/L (ref 40–129)
ALT SERPL-CCNC: 19 U/L (ref 10–40)
ANION GAP SERPL CALCULATED.3IONS-SCNC: 14 MMOL/L (ref 3–16)
AST SERPL-CCNC: 15 U/L (ref 15–37)
BASOPHILS ABSOLUTE: 0 K/UL (ref 0–0.2)
BASOPHILS RELATIVE PERCENT: 0.4 %
BILIRUB SERPL-MCNC: 0.5 MG/DL (ref 0–1)
BUN BLDV-MCNC: 24 MG/DL (ref 7–20)
CALCIUM SERPL-MCNC: 8.6 MG/DL (ref 8.3–10.6)
CHLORIDE BLD-SCNC: 95 MMOL/L (ref 99–110)
CO2: 25 MMOL/L (ref 21–32)
CREAT SERPL-MCNC: 1.3 MG/DL (ref 0.8–1.3)
EOSINOPHILS ABSOLUTE: 0.1 K/UL (ref 0–0.6)
EOSINOPHILS RELATIVE PERCENT: 1.3 %
GFR AFRICAN AMERICAN: >60
GFR NON-AFRICAN AMERICAN: 53
GLOBULIN: 3.1 G/DL
GLUCOSE BLD-MCNC: 147 MG/DL (ref 70–99)
HCT VFR BLD CALC: 36.4 % (ref 40.5–52.5)
HEMOGLOBIN: 12.6 G/DL (ref 13.5–17.5)
LYMPHOCYTES ABSOLUTE: 1.2 K/UL (ref 1–5.1)
LYMPHOCYTES RELATIVE PERCENT: 13 %
MCH RBC QN AUTO: 31 PG (ref 26–34)
MCHC RBC AUTO-ENTMCNC: 34.6 G/DL (ref 31–36)
MCV RBC AUTO: 89.6 FL (ref 80–100)
MONOCYTES ABSOLUTE: 1.4 K/UL (ref 0–1.3)
MONOCYTES RELATIVE PERCENT: 14.9 %
NEUTROPHILS ABSOLUTE: 6.7 K/UL (ref 1.7–7.7)
NEUTROPHILS RELATIVE PERCENT: 70.4 %
PDW BLD-RTO: 13.5 % (ref 12.4–15.4)
PLATELET # BLD: 200 K/UL (ref 135–450)
PMV BLD AUTO: 8.4 FL (ref 5–10.5)
POTASSIUM SERPL-SCNC: 4.7 MMOL/L (ref 3.5–5.1)
RBC # BLD: 4.06 M/UL (ref 4.2–5.9)
SODIUM BLD-SCNC: 134 MMOL/L (ref 136–145)
TOTAL PROTEIN: 6.5 G/DL (ref 6.4–8.2)
WBC # BLD: 9.5 K/UL (ref 4–11)

## 2021-06-29 PROCEDURE — 1111F DSCHRG MED/CURRENT MED MERGE: CPT | Performed by: INTERNAL MEDICINE

## 2021-06-29 PROCEDURE — 36415 COLL VENOUS BLD VENIPUNCTURE: CPT | Performed by: INTERNAL MEDICINE

## 2021-06-29 PROCEDURE — 99495 TRANSJ CARE MGMT MOD F2F 14D: CPT | Performed by: INTERNAL MEDICINE

## 2021-06-29 ASSESSMENT — ENCOUNTER SYMPTOMS
GASTROINTESTINAL NEGATIVE: 1
WHEEZING: 0
SHORTNESS OF BREATH: 0
COUGH: 0
CHEST TIGHTNESS: 0

## 2021-06-29 NOTE — PROGRESS NOTES
Post-Discharge Transitional Care Management Services or Hospital Follow Up      Arvind Pimentel   YOB: 1940    Date of Office Visit:  6/29/2021  Date of Hospital Admission: 6/9/21  Date of Hospital Discharge: 6/14/21  Readmission Risk Score(high >=14%.  Medium >=10%):Readmission Risk Score: 15      Care management risk score Rising risk (score 2-5) and Complex Care (Scores >=6): 3     Non face to face  following discharge, date last encounter closed (first attempt may have been earlier): 6/15/2021  1:15 PM 6/15/2021  1:15 PM    Call initiated 2 business days of discharge: Yes     Patient Active Problem List   Diagnosis    Encounter for long-term (current) use of other medications    Osteoarthritis of shoulder    SVT (supraventricular tachycardia),paroxysmal.    Atrial fibrillation ([address]    Long term current use of anticoagulant therapy    Sleep apnea-on CPAP.    HTN (hypertension)    Hyperlipidemia    Lumbar spine strain    Bunion of great toe of right foot    Chronic diastolic CHF (congestive heart failure) ([address]    Quadriceps tendinitis-right    Closed nondisplaced fracture of left pubis with routine healing    Hordeolum externum of right upper eyelid    Seasonal allergic rhinitis    Allergic contact dermatitis due to plants, except food    Vestibular dizziness    Plantar fasciitis, right    Morbid obesity with BMI of 40.0-44.9, adult ([address]    Generalized abdominal pain    Fever    Shortness of breath    Continuous severe abdominal pain    Acute cholecystitis    Thrombocytopenia (HCC)    Diabetes mellitus type 2 in obese ([address]       No Known Allergies    Medications listed as ordered at the time of discharge from [facility] Medication Instructions QUIRINO:    Printed on:06/29/21 1313   Medication Information                      amiodarone (CORDARONE) 200 MG tablet  TAKE 1 TABLET EVERY DAY             aspirin EC 81 MG EC tablet  Take 1 tablet by mouth daily. atorvastatin (LIPITOR) 80 MG tablet  TAKE 1 TABLET EVERY DAY             carvedilol (COREG) 25 MG tablet  TAKE 1 TABLET TWICE DAILY WITH MEALS             furosemide (LASIX) 20 MG tablet  Take 10 mg by mouth daily             metFORMIN (GLUCOPHAGE) 500 MG tablet  Take 1 tablet by mouth 2 times daily (with meals)             omeprazole (PRILOSEC) 20 MG delayed release capsule  Take 20 mg by mouth daily             ondansetron (ZOFRAN ODT) 4 MG disintegrating tablet  Take 1 tablet by mouth 4 times daily as needed for Nausea or Vomiting             polyethylene glycol (GLYCOLAX) 17 g packet  Take 17 g by mouth daily             spironolactone (ALDACTONE) 25 MG tablet  TAKE 1 TABLET EVERY DAY                   Medications marked \"taking\" at this time  Outpatient Medications Marked as Taking for the 6/29/21 encounter (Office Visit) with Sarah Gilbert MD   Medication Sig Dispense Refill    polyethylene glycol (GLYCOLAX) 17 g packet Take 17 g by mouth daily 527 g 1    metFORMIN (GLUCOPHAGE) 500 MG tablet Take 1 tablet by mouth 2 times daily (with meals) 60 tablet 3    furosemide (LASIX) 20 MG tablet Take 10 mg by mouth daily      omeprazole (PRILOSEC) 20 MG delayed release capsule Take 20 mg by mouth daily      ondansetron (ZOFRAN ODT) 4 MG disintegrating tablet Take 1 tablet by mouth 4 times daily as needed for Nausea or Vomiting 20 tablet 0    spironolactone (ALDACTONE) 25 MG tablet TAKE 1 TABLET EVERY DAY 90 tablet 1    carvedilol (COREG) 25 MG tablet TAKE 1 TABLET TWICE DAILY WITH MEALS 180 tablet 1    amiodarone (CORDARONE) 200 MG tablet TAKE 1 TABLET EVERY DAY 90 tablet 1    atorvastatin (LIPITOR) 80 MG tablet TAKE 1 TABLET EVERY DAY 90 tablet 1    aspirin EC 81 MG EC tablet Take 1 tablet by mouth daily.  30 tablet 3        Medications patient taking as of now reconciled against medications ordered at time of hospital discharge: Yes    Chief Complaint   Patient presents with   [employer] Follow-Up from Hospital       HPI    Inpatient course: Discharge summary reviewed- see chart. Interval history/Current status: He is not able to take metformin as it made her very nauseated and symptoms resolved after stopping  bm are slightly loose and appetite is coming back  Walking 1 mile daily and has no symptoms with activity  Has no cp or gi symptoms and voiding well  Sleeps ok     Review of Systems   Constitutional: Positive for fatigue. Negative for activity change, appetite change, chills, fever and unexpected weight change. Respiratory: Negative for cough, chest tightness, shortness of breath and wheezing. Cardiovascular: Negative for chest pain, palpitations and leg swelling. Gastrointestinal: Negative. Genitourinary: Negative. Neurological: Negative for dizziness, light-headedness and headaches. Vitals:    06/29/21 1252   BP: (!) 144/80   Pulse: 55   SpO2: 95%   Weight: 217 lb 3.2 oz (98.5 kg)   Height: 5' 6\" (1.676 m)     Body mass index is 35.06 kg/m². Wt Readings from Last 3 Encounters:   06/29/21 217 lb 3.2 oz (98.5 kg)   06/14/21 233 lb 14.5 oz (106.1 kg)   06/09/21 234 lb 3.2 oz (106.2 kg)     BP Readings from Last 3 Encounters:   06/29/21 (!) 144/80   06/14/21 (!) 148/79   06/11/21 (!) 116/58       Physical Exam  Vitals and nursing note reviewed. Constitutional:       General: He is not in acute distress. HENT:      Mouth/Throat:      Mouth: Mucous membranes are moist.      Pharynx: Oropharynx is clear. Eyes:      General: No scleral icterus. Extraocular Movements: Extraocular movements intact. Conjunctiva/sclera: Conjunctivae normal.      Pupils: Pupils are equal, round, and reactive to light. Neck:      Thyroid: No thyromegaly. Vascular: No carotid bruit or JVD. Cardiovascular:      Rate and Rhythm: Normal rate and regular rhythm. Pulses: Normal pulses. Heart sounds: Normal heart sounds. No murmur heard. No gallop.     Pulmonary: Effort: No respiratory distress. Breath sounds: Normal breath sounds. No wheezing, rhonchi or rales. Abdominal:      General: Bowel sounds are normal. There is no distension. Palpations: Abdomen is soft. There is no mass. Tenderness: There is abdominal tenderness (minimally over incisions and follows up with surgery soon). Lymphadenopathy:      Cervical: No cervical adenopathy. Neurological:      General: No focal deficit present. Mental Status: He is alert and oriented to person, place, and time.              Assessment/Plan:  1- post op gangrenous cholecystitis-improving and follow up with surgery  2- HTN well controlled  3-atrial fibrillation and in regular rhythm  4- diabetes  mellitus -2 and had side defects with metformin   5- chf well controlled    Plan-  Suggested Januvia but he does not want to take any medications for diabetes  Will wait for 6 weeks and recheck sugar and A1C   Continue current medications  See in  6 weeks   Medical Decision Making: complex

## 2021-06-29 NOTE — PATIENT INSTRUCTIONS
Suggested Januvia but he does not want to take any medications for diabetes  Will wait for 6 weeks and recheck sugar and A1C   Continue current medications  See in  6 weeks

## 2021-07-01 ENCOUNTER — OFFICE VISIT (OUTPATIENT)
Dept: SURGERY | Age: 81
End: 2021-07-01

## 2021-07-01 DIAGNOSIS — K81.0 ACUTE CHOLECYSTITIS: Primary | ICD-10-CM

## 2021-07-01 PROCEDURE — 99024 POSTOP FOLLOW-UP VISIT: CPT | Performed by: SURGERY

## 2021-07-01 NOTE — PROGRESS NOTES
Faby Duckworth (:  1940) is a 68 y.o. male,Established patient, here for evaluation of the following chief complaint(s):  Post-Op Check (Pt is here today for a postop visit. )         ASSESSMENT/PLAN:  1. Acute cholecystitis      Follow up with me as needed           Subjective   SUBJECTIVE/OBJECTIVE:  HPI  Patient presents s/p Laparoscopic Cholecystectomy with Cholangiogram. Patient is four weeks post op. Pain level is minor. Incision appearance: well healed x 4. Still some fullness at umbilicus but no sign of infection. Post op complications: none. Pathology report reviewed with patient and showed cholecystitis. Follow up prn. Review of Systems       Objective   Physical Exam       Electronically signed by Jyoti Harper MD on 2021 at 9:25 AM        An electronic signature was used to authenticate this note.     --Jyoti Harper MD No

## 2021-07-08 ENCOUNTER — CARE COORDINATION (OUTPATIENT)
Dept: CASE MANAGEMENT | Age: 81
End: 2021-07-08

## 2021-07-08 NOTE — CARE COORDINATION
[facility] Transitions Follow Up Call    2021    Patient: Bree Wrgiht  Patient : 1940   MRN: [id number]  Reason for Admission: Acute Cholecystitis  Discharge Date: 21 RARS: Readmission Risk Score: 15         Spoke with: Bree Wright - patient    Care Transitions Subsequent and Final Call    Subsequent and Final Calls  Care Transitions Interventions  Other Interventions:         Antolin Galicia states he is doing \"good\" He states he is walking and back to almost 100%. He denies any needs at this time - declines any additional CTN outreach.     Follow Up  Future Appointments   Date Time Provider Jose Figueredo   8/3/2021 12:30 PM Zaheer Davidson MD [id number] Chen RN

## 2021-08-03 ENCOUNTER — OFFICE VISIT (OUTPATIENT)
Dept: INTERNAL MEDICINE CLINIC | Age: 81
End: 2021-08-03
Payer: MEDICARE

## 2021-08-03 VITALS
TEMPERATURE: 97.2 F | OXYGEN SATURATION: 97 % | SYSTOLIC BLOOD PRESSURE: 124 MMHG | WEIGHT: 220.6 LBS | HEART RATE: 50 BPM | DIASTOLIC BLOOD PRESSURE: 70 MMHG | HEIGHT: 66 IN | BODY MASS INDEX: 35.45 KG/M2 | RESPIRATION RATE: 16 BRPM

## 2021-08-03 DIAGNOSIS — I10 ESSENTIAL HYPERTENSION: Primary | ICD-10-CM

## 2021-08-03 DIAGNOSIS — E11.69 DIABETES MELLITUS TYPE 2 IN OBESE (HCC): ICD-10-CM

## 2021-08-03 DIAGNOSIS — E66.9 DIABETES MELLITUS TYPE 2 IN OBESE (HCC): ICD-10-CM

## 2021-08-03 PROCEDURE — 1036F TOBACCO NON-USER: CPT | Performed by: INTERNAL MEDICINE

## 2021-08-03 PROCEDURE — 1123F ACP DISCUSS/DSCN MKR DOCD: CPT | Performed by: INTERNAL MEDICINE

## 2021-08-03 PROCEDURE — 4040F PNEUMOC VAC/ADMIN/RCVD: CPT | Performed by: INTERNAL MEDICINE

## 2021-08-03 PROCEDURE — 99213 OFFICE O/P EST LOW 20 MIN: CPT | Performed by: INTERNAL MEDICINE

## 2021-08-03 PROCEDURE — G8417 CALC BMI ABV UP PARAM F/U: HCPCS | Performed by: INTERNAL MEDICINE

## 2021-08-03 PROCEDURE — G8427 DOCREV CUR MEDS BY ELIG CLIN: HCPCS | Performed by: INTERNAL MEDICINE

## 2021-08-03 ASSESSMENT — ENCOUNTER SYMPTOMS
TROUBLE SWALLOWING: 0
NAUSEA: 0
CONSTIPATION: 0
COUGH: 0
VOMITING: 0
SHORTNESS OF BREATH: 0
WHEEZING: 0
CHEST TIGHTNESS: 0
ABDOMINAL PAIN: 0
DIARRHEA: 0

## 2021-08-03 NOTE — PROGRESS NOTES
Subjective:      Patient ID: Yves Crowley is a 68 y.o. male. Chief Complaint   Patient presents with    Hypertension        HPI  He feels ok and has been to work with out problems  Has no symptoms with activity but for knee pains  Has no gi or gu symptoms   Sleeps well  Has no gi symptoms -off metformin  Review of Systems   Constitutional: Negative for activity change, appetite change and unexpected weight change. HENT: Negative for trouble swallowing. Respiratory: Negative for cough, chest tightness, shortness of breath and wheezing. Cardiovascular: Negative for chest pain, palpitations and leg swelling. Gastrointestinal: Negative for abdominal pain, constipation, diarrhea, nausea and vomiting. Genitourinary: Negative. Neurological: Negative for dizziness, light-headedness and headaches. Current Outpatient Medications   Medication Sig Dispense Refill    furosemide (LASIX) 20 MG tablet Take 10 mg by mouth daily      omeprazole (PRILOSEC) 20 MG delayed release capsule Take 20 mg by mouth daily      ondansetron (ZOFRAN ODT) 4 MG disintegrating tablet Take 1 tablet by mouth 4 times daily as needed for Nausea or Vomiting 20 tablet 0    spironolactone (ALDACTONE) 25 MG tablet TAKE 1 TABLET EVERY DAY 90 tablet 1    carvedilol (COREG) 25 MG tablet TAKE 1 TABLET TWICE DAILY WITH MEALS 180 tablet 1    amiodarone (CORDARONE) 200 MG tablet TAKE 1 TABLET EVERY DAY 90 tablet 1    atorvastatin (LIPITOR) 80 MG tablet TAKE 1 TABLET EVERY DAY 90 tablet 1    aspirin EC 81 MG EC tablet Take 1 tablet by mouth daily. 30 tablet 3     No current facility-administered medications for this visit. No results for input(s): WBC, HGB, HCT, MCV, PLT in the last 720 hours.      Lab Results   Component Value Date     06/29/2021    K 4.7 06/29/2021    K 3.5 06/10/2021    CL 95 06/29/2021    CO2 25 06/29/2021    BUN 24 06/29/2021    CREATININE 1.3 06/29/2021    GLUCOSE 147 06/29/2021    CALCIUM 8.6 06/29/2021        Lab Results   Component Value Date    CHOL 130 08/20/2020    CHOL 141 05/02/2019    CHOL 145 12/08/2017     Lab Results   Component Value Date    TRIG 182 (H) 08/20/2020    TRIG 164 (H) 05/02/2019    TRIG 150 12/08/2017     Lab Results   Component Value Date    HDL 33 (L) 08/20/2020    HDL 35 (L) 05/02/2019    HDL 43 12/08/2017     Lab Results   Component Value Date    LDLCALC 61 08/20/2020    LDLCALC 73 05/02/2019    LDLCALC 72 12/08/2017     Lab Results   Component Value Date    LABVLDL 36 08/20/2020    LABVLDL 33 05/02/2019    LABVLDL 30 12/08/2017     No results found for: CHOLHDLRATIO     Objective:   Physical Exam  Vitals and nursing note reviewed. Constitutional:       General: He is not in acute distress. Eyes:      General: No scleral icterus. Extraocular Movements: Extraocular movements intact. Conjunctiva/sclera: Conjunctivae normal.      Pupils: Pupils are equal, round, and reactive to light. Neck:      Thyroid: No thyromegaly. Vascular: No carotid bruit or JVD. Cardiovascular:      Rate and Rhythm: Normal rate and regular rhythm. Pulses: Normal pulses. Heart sounds: Normal heart sounds. No murmur heard. No gallop. Pulmonary:      Effort: No respiratory distress. Breath sounds: Normal breath sounds. No wheezing, rhonchi or rales. Musculoskeletal:      Right lower leg: No edema. Left lower leg: No edema. Lymphadenopathy:      Cervical: No cervical adenopathy. Neurological:      Mental Status: He is alert and oriented to person, place, and time.          Assessment:      htn well controlled  Diabetes -seem to be stable      Plan:      Check A1C next visit    Continue current medications  See in 3  months  Continue diabetic diet and exercise  Michele Sibley MD

## 2021-08-03 NOTE — PATIENT INSTRUCTIONS
Check A1C next visit    Continue current medications  See in 3  months  Continue diabetic diet and exercise  Elijio Claude, MD

## 2021-08-19 DIAGNOSIS — E78.2 MIXED HYPERLIPIDEMIA: ICD-10-CM

## 2021-08-19 DIAGNOSIS — I10 ESSENTIAL HYPERTENSION: ICD-10-CM

## 2021-08-20 RX ORDER — CARVEDILOL 25 MG/1
TABLET ORAL
Qty: 180 TABLET | Refills: 1 | Status: SHIPPED | OUTPATIENT
Start: 2021-08-20 | End: 2022-01-10

## 2021-08-20 RX ORDER — AMIODARONE HYDROCHLORIDE 200 MG/1
TABLET ORAL
Qty: 90 TABLET | Refills: 1 | Status: SHIPPED | OUTPATIENT
Start: 2021-08-20 | End: 2021-10-04 | Stop reason: CLARIF

## 2021-08-20 RX ORDER — ATORVASTATIN CALCIUM 80 MG/1
TABLET, FILM COATED ORAL
Qty: 90 TABLET | Refills: 1 | Status: SHIPPED | OUTPATIENT
Start: 2021-08-20 | End: 2022-01-10

## 2021-08-20 RX ORDER — SPIRONOLACTONE 25 MG/1
TABLET ORAL
Qty: 90 TABLET | Refills: 1 | Status: SHIPPED | OUTPATIENT
Start: 2021-08-20 | End: 2022-01-10

## 2021-10-04 ENCOUNTER — OFFICE VISIT (OUTPATIENT)
Dept: INTERNAL MEDICINE CLINIC | Age: 81
End: 2021-10-04
Payer: MEDICARE

## 2021-10-04 VITALS
BODY MASS INDEX: 35.84 KG/M2 | OXYGEN SATURATION: 97 % | DIASTOLIC BLOOD PRESSURE: 58 MMHG | WEIGHT: 223 LBS | HEIGHT: 66 IN | RESPIRATION RATE: 17 BRPM | HEART RATE: 51 BPM | SYSTOLIC BLOOD PRESSURE: 118 MMHG

## 2021-10-04 DIAGNOSIS — R73.9 HYPERGLYCEMIA: ICD-10-CM

## 2021-10-04 DIAGNOSIS — M54.2 NECK PAIN ON RIGHT SIDE: ICD-10-CM

## 2021-10-04 PROBLEM — E66.9 DIABETES MELLITUS TYPE 2 IN OBESE (HCC): Status: RESOLVED | Noted: 2021-06-14 | Resolved: 2021-10-04

## 2021-10-04 PROBLEM — E11.69 DIABETES MELLITUS TYPE 2 IN OBESE (HCC): Status: RESOLVED | Noted: 2021-06-14 | Resolved: 2021-10-04

## 2021-10-04 PROBLEM — L23.7 ALLERGIC CONTACT DERMATITIS DUE TO PLANTS, EXCEPT FOOD: Status: RESOLVED | Noted: 2020-04-29 | Resolved: 2021-10-04

## 2021-10-04 LAB — HBA1C MFR BLD: 6.2 %

## 2021-10-04 PROCEDURE — G8417 CALC BMI ABV UP PARAM F/U: HCPCS | Performed by: INTERNAL MEDICINE

## 2021-10-04 PROCEDURE — 1123F ACP DISCUSS/DSCN MKR DOCD: CPT | Performed by: INTERNAL MEDICINE

## 2021-10-04 PROCEDURE — G8427 DOCREV CUR MEDS BY ELIG CLIN: HCPCS | Performed by: INTERNAL MEDICINE

## 2021-10-04 PROCEDURE — 83036 HEMOGLOBIN GLYCOSYLATED A1C: CPT | Performed by: INTERNAL MEDICINE

## 2021-10-04 PROCEDURE — G8484 FLU IMMUNIZE NO ADMIN: HCPCS | Performed by: INTERNAL MEDICINE

## 2021-10-04 PROCEDURE — 99213 OFFICE O/P EST LOW 20 MIN: CPT | Performed by: INTERNAL MEDICINE

## 2021-10-04 PROCEDURE — 4040F PNEUMOC VAC/ADMIN/RCVD: CPT | Performed by: INTERNAL MEDICINE

## 2021-10-04 PROCEDURE — 1036F TOBACCO NON-USER: CPT | Performed by: INTERNAL MEDICINE

## 2021-10-04 RX ORDER — CELECOXIB 200 MG/1
200 CAPSULE ORAL DAILY
Qty: 14 CAPSULE | Refills: 3 | Status: SHIPPED | OUTPATIENT
Start: 2021-10-04 | End: 2021-11-09 | Stop reason: ALTCHOICE

## 2021-10-04 RX ORDER — AMIODARONE HYDROCHLORIDE 200 MG/1
TABLET ORAL
Qty: 1 TABLET | Refills: 0
Start: 2021-10-04 | End: 2022-09-25 | Stop reason: SDUPTHER

## 2021-10-04 ASSESSMENT — PATIENT HEALTH QUESTIONNAIRE - PHQ9
SUM OF ALL RESPONSES TO PHQ9 QUESTIONS 1 & 2: 0
SUM OF ALL RESPONSES TO PHQ QUESTIONS 1-9: 0
2. FEELING DOWN, DEPRESSED OR HOPELESS: 0
1. LITTLE INTEREST OR PLEASURE IN DOING THINGS: 0
SUM OF ALL RESPONSES TO PHQ QUESTIONS 1-9: 0
SUM OF ALL RESPONSES TO PHQ QUESTIONS 1-9: 0

## 2021-10-04 NOTE — PATIENT INSTRUCTIONS
Discussed with him and will have an xray cervical spine done  Start on celebrex and take 200 mg daily for 7-10 days   Call if not getting better

## 2021-10-04 NOTE — PROGRESS NOTES
Subjective:      Patient ID: Gay Shepard is a 68 y.o. male. Chief Complaint   Patient presents with    Neck Pain    Back Pain     upper back         HPI  Has pain rigth side of neck x 1 week and had no injury and increases with movement and is not radiating and is like soreness  Has no tingling or numbness in his arms and has no weakness  Has no sore throat and has no dysphagia symptoms  Review of Systems   Neurological: Negative for dizziness, speech difficulty, weakness, light-headedness and headaches. Current Outpatient Medications   Medication Sig Dispense Refill    amiodarone (CORDARONE) 200 MG tablet TAKE 1 TABLET EVERY DAY 90 tablet 1    carvedilol (COREG) 25 MG tablet TAKE 1 TABLET TWICE DAILY WITH MEALS 180 tablet 1    atorvastatin (LIPITOR) 80 MG tablet TAKE 1 TABLET EVERY DAY 90 tablet 1    spironolactone (ALDACTONE) 25 MG tablet TAKE 1 TABLET EVERY DAY 90 tablet 1    furosemide (LASIX) 20 MG tablet Take 10 mg by mouth daily      omeprazole (PRILOSEC) 20 MG delayed release capsule Take 20 mg by mouth daily      ondansetron (ZOFRAN ODT) 4 MG disintegrating tablet Take 1 tablet by mouth 4 times daily as needed for Nausea or Vomiting 20 tablet 0    aspirin EC 81 MG EC tablet Take 1 tablet by mouth daily. 30 tablet 3     No current facility-administered medications for this visit. No results for input(s): WBC, HGB, HCT, MCV, PLT in the last 720 hours.      Lab Results   Component Value Date     06/29/2021    K 4.7 06/29/2021    K 3.5 06/10/2021    CL 95 06/29/2021    CO2 25 06/29/2021    BUN 24 06/29/2021    CREATININE 1.3 06/29/2021    GLUCOSE 147 06/29/2021    CALCIUM 8.6 06/29/2021        Lab Results   Component Value Date    CHOL 130 08/20/2020    CHOL 141 05/02/2019    CHOL 145 12/08/2017     Lab Results   Component Value Date    TRIG 182 (H) 08/20/2020    TRIG 164 (H) 05/02/2019    TRIG 150 12/08/2017     Lab Results   Component Value Date    HDL 33 (L) 08/20/2020 HDL 35 (L) 05/02/2019    HDL 43 12/08/2017     Lab Results   Component Value Date    LDLCALC 61 08/20/2020    LDLCALC 73 05/02/2019    LDLCALC 72 12/08/2017     Lab Results   Component Value Date    LABVLDL 36 08/20/2020    LABVLDL 33 05/02/2019    LABVLDL 30 12/08/2017     No results found for: CHOLHDLRATIO     Objective:   Physical Exam  Vitals and nursing note reviewed. Constitutional:       General: He is not in acute distress. Musculoskeletal:         General: Tenderness (has tenderness rigth paraspinal areas and pain increases with rigth lateral bend and extension . Has sligth decrease in rom c-spine in all directions) present. No swelling or deformity. Right lower leg: No edema. Left lower leg: No edema. Neurological:      Mental Status: He is alert and oriented to person, place, and time.          Assessment:      Neck  pain most likely from arthritis with sligth inflamamtion and has no signs and symptosm of radiculaopth      Plan:      Discussed with him and will have an xray cervical spine done  Start on celebrex and take 200 mg daily for 7-10 days   Call if not getting better  Advised range of motion exercises for hsi rosa maria Tracy MD

## 2021-10-05 ENCOUNTER — HOSPITAL ENCOUNTER (OUTPATIENT)
Dept: GENERAL RADIOLOGY | Age: 81
Discharge: HOME | End: 2021-10-05
Payer: MEDICARE

## 2021-10-05 ENCOUNTER — HOSPITAL ENCOUNTER (OUTPATIENT)
Age: 81
Discharge: HOME | End: 2021-10-05
Payer: MEDICARE

## 2021-10-05 DIAGNOSIS — M54.2 NECK PAIN ON RIGHT SIDE: ICD-10-CM

## 2021-10-05 PROCEDURE — 72040 X-RAY EXAM NECK SPINE 2-3 VW: CPT

## 2021-10-28 ENCOUNTER — TELEPHONE (OUTPATIENT)
Dept: INTERNAL MEDICINE CLINIC | Age: 81
End: 2021-10-28

## 2021-10-28 RX ORDER — MECLIZINE HYDROCHLORIDE 25 MG/1
25 TABLET ORAL 3 TIMES DAILY PRN
Qty: 30 TABLET | Refills: 0 | Status: SHIPPED | OUTPATIENT
Start: 2021-10-28 | End: 2021-11-07

## 2021-10-28 NOTE — TELEPHONE ENCOUNTER
----- Message from Karlene Sherman sent at 10/28/2021  9:33 AM EDT -----  Subject: Message to Provider    QUESTIONS  Information for Provider? Pt's wife called stating pt has been feeling   some dizziness. She is requesting a medication to help with the dizziness. Pharmacy is [facility]   ---------------------------------------------------------------------------  --------------  Mariano MARI  What is the best way for the office to contact you? OK to leave message on   voicemail  Preferred Call Back Phone Number? [phone]  ---------------------------------------------------------------------------  --------------  SCRIPT ANSWERS  Relationship to Patient? Other  Representative Name? Sherita-Spouse  Is the Representative on the appropriate HIPAA document in Epic?  Yes

## 2021-11-09 ENCOUNTER — OFFICE VISIT (OUTPATIENT)
Dept: INTERNAL MEDICINE CLINIC | Age: 81
End: 2021-11-09
Payer: MEDICARE

## 2021-11-09 VITALS
SYSTOLIC BLOOD PRESSURE: 128 MMHG | RESPIRATION RATE: 17 BRPM | BODY MASS INDEX: 36.38 KG/M2 | HEART RATE: 50 BPM | OXYGEN SATURATION: 95 % | WEIGHT: 226.4 LBS | DIASTOLIC BLOOD PRESSURE: 70 MMHG | HEIGHT: 66 IN

## 2021-11-09 DIAGNOSIS — I10 PRIMARY HYPERTENSION: Primary | ICD-10-CM

## 2021-11-09 DIAGNOSIS — I50.32 CHRONIC DIASTOLIC CHF (CONGESTIVE HEART FAILURE) (HCC): ICD-10-CM

## 2021-11-09 DIAGNOSIS — E78.2 MIXED HYPERLIPIDEMIA: ICD-10-CM

## 2021-11-09 DIAGNOSIS — Z12.5 PROSTATE CANCER SCREENING: ICD-10-CM

## 2021-11-09 PROBLEM — R50.9 FEVER: Status: RESOLVED | Noted: 2021-06-09 | Resolved: 2021-11-09

## 2021-11-09 PROBLEM — R10.84 GENERALIZED ABDOMINAL PAIN: Status: RESOLVED | Noted: 2021-06-09 | Resolved: 2021-11-09

## 2021-11-09 PROBLEM — R10.9 CONTINUOUS SEVERE ABDOMINAL PAIN: Status: RESOLVED | Noted: 2021-06-09 | Resolved: 2021-11-09

## 2021-11-09 PROCEDURE — G8427 DOCREV CUR MEDS BY ELIG CLIN: HCPCS | Performed by: INTERNAL MEDICINE

## 2021-11-09 PROCEDURE — 4040F PNEUMOC VAC/ADMIN/RCVD: CPT | Performed by: INTERNAL MEDICINE

## 2021-11-09 PROCEDURE — 1036F TOBACCO NON-USER: CPT | Performed by: INTERNAL MEDICINE

## 2021-11-09 PROCEDURE — G8484 FLU IMMUNIZE NO ADMIN: HCPCS | Performed by: INTERNAL MEDICINE

## 2021-11-09 PROCEDURE — 90694 VACC AIIV4 NO PRSRV 0.5ML IM: CPT | Performed by: INTERNAL MEDICINE

## 2021-11-09 PROCEDURE — 99214 OFFICE O/P EST MOD 30 MIN: CPT | Performed by: INTERNAL MEDICINE

## 2021-11-09 PROCEDURE — G0008 ADMIN INFLUENZA VIRUS VAC: HCPCS | Performed by: INTERNAL MEDICINE

## 2021-11-09 PROCEDURE — G8417 CALC BMI ABV UP PARAM F/U: HCPCS | Performed by: INTERNAL MEDICINE

## 2021-11-09 PROCEDURE — 1123F ACP DISCUSS/DSCN MKR DOCD: CPT | Performed by: INTERNAL MEDICINE

## 2021-11-09 ASSESSMENT — ENCOUNTER SYMPTOMS
COUGH: 0
GASTROINTESTINAL NEGATIVE: 1
TROUBLE SWALLOWING: 0
CHEST TIGHTNESS: 0
SHORTNESS OF BREATH: 0
WHEEZING: 0

## 2021-11-09 NOTE — PROGRESS NOTES
Subjective:      Patient ID: Mansi Davila is a 68 y.o. male. Chief Complaint   Patient presents with    Hypertension        HPI  Neck pain is better and not gone completely  At times feels dizzy when he gets up from bed in am and gone in few seconds  Has no cp or cns symptoms  Walks daily x 2 miles with out symptoms-45-50 minutes  Has no cp gi or gu symptoms   Review of Systems   Constitutional: Negative for activity change, appetite change and unexpected weight change. HENT: Negative for trouble swallowing. Respiratory: Negative for cough, chest tightness, shortness of breath and wheezing. Cardiovascular: Negative for chest pain, palpitations and leg swelling. Gastrointestinal: Negative. Genitourinary: Negative. Neurological: Negative for dizziness, light-headedness and headaches. Current Outpatient Medications   Medication Sig Dispense Refill    amiodarone (CORDARONE) 200 MG tablet TAKE 1 TABLET EVERY DAY 1 tablet 0    carvedilol (COREG) 25 MG tablet TAKE 1 TABLET TWICE DAILY WITH MEALS 180 tablet 1    atorvastatin (LIPITOR) 80 MG tablet TAKE 1 TABLET EVERY DAY 90 tablet 1    spironolactone (ALDACTONE) 25 MG tablet TAKE 1 TABLET EVERY DAY 90 tablet 1    furosemide (LASIX) 20 MG tablet Take 10 mg by mouth daily      omeprazole (PRILOSEC) 20 MG delayed release capsule Take 20 mg by mouth daily      ondansetron (ZOFRAN ODT) 4 MG disintegrating tablet Take 1 tablet by mouth 4 times daily as needed for Nausea or Vomiting 20 tablet 0    aspirin EC 81 MG EC tablet Take 1 tablet by mouth daily. 30 tablet 3    celecoxib (CELEBREX) 200 MG capsule Take 1 capsule by mouth daily for 14 days 14 capsule 3     No current facility-administered medications for this visit. No results for input(s): WBC, HGB, HCT, MCV, PLT in the last 720 hours.      Lab Results   Component Value Date     06/29/2021    K 4.7 06/29/2021    K 3.5 06/10/2021    CL 95 06/29/2021    CO2 25 06/29/2021 BUN 24 06/29/2021    CREATININE 1.3 06/29/2021    GLUCOSE 147 06/29/2021    CALCIUM 8.6 06/29/2021        Lab Results   Component Value Date    CHOL 130 08/20/2020    CHOL 141 05/02/2019    CHOL 145 12/08/2017     Lab Results   Component Value Date    TRIG 182 (H) 08/20/2020    TRIG 164 (H) 05/02/2019    TRIG 150 12/08/2017     Lab Results   Component Value Date    HDL 33 (L) 08/20/2020    HDL 35 (L) 05/02/2019    HDL 43 12/08/2017     Lab Results   Component Value Date    LDLCALC 61 08/20/2020    LDLCALC 73 05/02/2019    LDLCALC 72 12/08/2017     Lab Results   Component Value Date    LABVLDL 36 08/20/2020    LABVLDL 33 05/02/2019    LABVLDL 30 12/08/2017     No results found for: CHOLHDLRATIO     Objective:   Physical Exam  Vitals and nursing note reviewed. Constitutional:       General: He is not in acute distress. Eyes:      General: No scleral icterus. Extraocular Movements: Extraocular movements intact. Conjunctiva/sclera: Conjunctivae normal.      Pupils: Pupils are equal, round, and reactive to light. Neck:      Thyroid: No thyromegaly. Vascular: No carotid bruit or JVD. Cardiovascular:      Rate and Rhythm: Normal rate and regular rhythm. Pulses: Normal pulses. Heart sounds: Normal heart sounds. No murmur heard. No gallop. Pulmonary:      Effort: No respiratory distress. Breath sounds: Normal breath sounds. No wheezing, rhonchi or rales. Musculoskeletal:      Right lower leg: No edema. Left lower leg: No edema. Lymphadenopathy:      Cervical: No cervical adenopathy. Neurological:      Mental Status: He is alert and oriented to person, place, and time.          Assessment:      htn well controlled  H/o atrial fib and is in sinus rhythm-on amidarone    chf well controlled  Plan:      Advised neck exercises 30 times each movement daily    Takes OTC omeprazole for his heart burns  Continue current medications  Next colonoscopy in 2023  See in  3 months  Josep Sheehan Corey Vaughan, MD

## 2021-11-09 NOTE — PATIENT INSTRUCTIONS
Advised neck exercises 30 times each movement daily    Takes OTC omeprazole for his heart burns  Continue current medications  Next colonoscopy in 2023  See in  3 months

## 2021-11-12 ENCOUNTER — NURSE ONLY (OUTPATIENT)
Dept: INTERNAL MEDICINE CLINIC | Age: 81
End: 2021-11-12
Payer: MEDICARE

## 2021-11-12 DIAGNOSIS — E78.2 MIXED HYPERLIPIDEMIA: ICD-10-CM

## 2021-11-12 DIAGNOSIS — Z12.5 PROSTATE CANCER SCREENING: ICD-10-CM

## 2021-11-12 DIAGNOSIS — I10 PRIMARY HYPERTENSION: ICD-10-CM

## 2021-11-12 LAB
ALBUMIN SERPL-MCNC: 4 G/DL (ref 3.4–5)
ALP BLD-CCNC: 65 U/L (ref 40–129)
ALT SERPL-CCNC: 13 U/L (ref 10–40)
ANION GAP SERPL CALCULATED.3IONS-SCNC: 19 MMOL/L (ref 3–16)
AST SERPL-CCNC: 14 U/L (ref 15–37)
BASOPHILS ABSOLUTE: 0 K/UL (ref 0–0.2)
BASOPHILS RELATIVE PERCENT: 0.6 %
BILIRUB SERPL-MCNC: 0.5 MG/DL (ref 0–1)
BILIRUBIN DIRECT: <0.2 MG/DL (ref 0–0.3)
BILIRUBIN, INDIRECT: ABNORMAL MG/DL (ref 0–1)
BUN BLDV-MCNC: 27 MG/DL (ref 7–20)
CALCIUM SERPL-MCNC: 9 MG/DL (ref 8.3–10.6)
CHLORIDE BLD-SCNC: 106 MMOL/L (ref 99–110)
CHOLESTEROL, TOTAL: 143 MG/DL (ref 0–199)
CO2: 18 MMOL/L (ref 21–32)
CREAT SERPL-MCNC: 1.2 MG/DL (ref 0.8–1.3)
EOSINOPHILS ABSOLUTE: 0.2 K/UL (ref 0–0.6)
EOSINOPHILS RELATIVE PERCENT: 2.6 %
GFR AFRICAN AMERICAN: >60
GFR NON-AFRICAN AMERICAN: 58
GLUCOSE BLD-MCNC: 126 MG/DL (ref 70–99)
HCT VFR BLD CALC: 42.3 % (ref 40.5–52.5)
HDLC SERPL-MCNC: 36 MG/DL (ref 40–60)
HEMOGLOBIN: 14.1 G/DL (ref 13.5–17.5)
LDL CHOLESTEROL CALCULATED: 76 MG/DL
LYMPHOCYTES ABSOLUTE: 1.8 K/UL (ref 1–5.1)
LYMPHOCYTES RELATIVE PERCENT: 25 %
MCH RBC QN AUTO: 31 PG (ref 26–34)
MCHC RBC AUTO-ENTMCNC: 33.2 G/DL (ref 31–36)
MCV RBC AUTO: 93.2 FL (ref 80–100)
MONOCYTES ABSOLUTE: 0.9 K/UL (ref 0–1.3)
MONOCYTES RELATIVE PERCENT: 11.9 %
NEUTROPHILS ABSOLUTE: 4.3 K/UL (ref 1.7–7.7)
NEUTROPHILS RELATIVE PERCENT: 59.9 %
PDW BLD-RTO: 13.6 % (ref 12.4–15.4)
PLATELET # BLD: 131 K/UL (ref 135–450)
PMV BLD AUTO: 9.6 FL (ref 5–10.5)
POTASSIUM SERPL-SCNC: 4.4 MMOL/L (ref 3.5–5.1)
PROSTATE SPECIFIC ANTIGEN: 1.72 NG/ML (ref 0–4)
RBC # BLD: 4.53 M/UL (ref 4.2–5.9)
SODIUM BLD-SCNC: 143 MMOL/L (ref 136–145)
TOTAL PROTEIN: 6.2 G/DL (ref 6.4–8.2)
TRIGL SERPL-MCNC: 155 MG/DL (ref 0–150)
VLDLC SERPL CALC-MCNC: 31 MG/DL
WBC # BLD: 7.2 K/UL (ref 4–11)

## 2021-11-12 PROCEDURE — 99999 PR OFFICE/OUTPT VISIT,PROCEDURE ONLY: CPT | Performed by: INTERNAL MEDICINE

## 2021-11-12 PROCEDURE — 36415 COLL VENOUS BLD VENIPUNCTURE: CPT | Performed by: INTERNAL MEDICINE

## 2021-12-20 RX ORDER — MECLIZINE HYDROCHLORIDE 25 MG/1
25 TABLET ORAL 3 TIMES DAILY PRN
Qty: 30 TABLET | Refills: 1 | Status: SHIPPED | OUTPATIENT
Start: 2021-12-20 | End: 2021-12-30

## 2022-01-05 ENCOUNTER — VIRTUAL VISIT (OUTPATIENT)
Dept: INTERNAL MEDICINE CLINIC | Age: 82
End: 2022-01-05
Payer: MEDICARE

## 2022-01-05 DIAGNOSIS — U07.1 COVID-19 VIRUS INFECTION: ICD-10-CM

## 2022-01-05 PROCEDURE — 99423 OL DIG E/M SVC 21+ MIN: CPT | Performed by: INTERNAL MEDICINE

## 2022-01-05 ASSESSMENT — ENCOUNTER SYMPTOMS
GASTROINTESTINAL NEGATIVE: 1
WHEEZING: 0
TROUBLE SWALLOWING: 0
SHORTNESS OF BREATH: 0
SORE THROAT: 1
COUGH: 1

## 2022-01-05 NOTE — PROGRESS NOTES
Sonal Julian is a 80 y.o. male evaluated via telephone on 1/5/2022. Consent:  He and/or health care decision maker is aware that that he may receive a bill for this telephone service, depending on his insurance coverage, and has provided verbal consent to proceed: Yes      Documentation:  I communicated with the patient and/or health care decision maker about his symptoms and positive covid test  Details of this discussion including any medical advice provided:  Has ore throat and mild cough for 3 days and home Covid test was positive today for Covid virus  He does not feel bad he has not lost hs taste or smell  He has no dyspnea or wheezing and has no chest pain   He has no gi symptoms and able to get around well  He had family  gathering for holidays and he did not se any one who may be sick  Review of Systems   Constitutional: Negative for activity change, appetite change, chills, fatigue and fever. HENT: Positive for sore throat. Negative for trouble swallowing. Respiratory: Positive for cough. Negative for shortness of breath and wheezing. Cardiovascular: Negative for chest pain, palpitations and leg swelling. Gastrointestinal: Negative. Genitourinary: Negative. Neurological: Negative for dizziness, light-headedness and headaches. Has URI symptoms with positive home covid test and he seem to be stable  Plan  Advised him to maintain good nutrition and hydration   Continue aspirin  Start with zinc and vit-d supplements . 50 mg bid and 1000 units daily  Check oxygen with oximeter and call if it goes down below 90.   Will arrange for antibody transfusion at [facility] as he has lot of risk factors and he understood -hope they have enough supple for transfusion  If not he may have to go to ER of other hospitals and he understood         I affirm this is a Patient Initiated Episode with a Patient who has not had a related appointment within my department in the past 7 days or scheduled within the next 24 hours. Patient identification was verified at the start of the visit: Yes    Total Time: minutes: 21-30 minutes    The visit was conducted pursuant to the emergency declaration under the [address] [place] [address] and the [employer] General Act. Patient identification was verified, and a caregiver was present when appropriate. The patient was located in a state where the provider was credentialed to provide care.     Note: not billable if this call serves to triage the patient into an appointment for the relevant concern      Julius Cohen MD

## 2022-01-06 ENCOUNTER — HOSPITAL ENCOUNTER (OUTPATIENT)
Dept: ONCOLOGY | Age: 82
Setting detail: INFUSION SERIES
Discharge: HOME | End: 2022-01-06
Payer: MEDICARE

## 2022-01-06 VITALS
HEART RATE: 52 BPM | TEMPERATURE: 98.2 F | DIASTOLIC BLOOD PRESSURE: 55 MMHG | RESPIRATION RATE: 16 BRPM | OXYGEN SATURATION: 98 % | SYSTOLIC BLOOD PRESSURE: 154 MMHG

## 2022-01-06 PROCEDURE — M0245 HC IV INFUSION BAMLANIVIMAB & ETESEVIMAB W/MONITORING: HCPCS

## 2022-01-06 PROCEDURE — 96365 THER/PROPH/DIAG IV INF INIT: CPT

## 2022-01-06 PROCEDURE — 99211 OFF/OP EST MAY X REQ PHY/QHP: CPT

## 2022-01-06 PROCEDURE — 2580000003 HC RX 258: Performed by: INTERNAL MEDICINE

## 2022-01-06 PROCEDURE — 2500000003 HC RX 250 WO HCPCS: Performed by: INTERNAL MEDICINE

## 2022-01-06 PROCEDURE — 6360000002 HC RX W HCPCS: Performed by: INTERNAL MEDICINE

## 2022-01-06 RX ADMIN — SODIUM CHLORIDE: 9 INJECTION, SOLUTION INTRAVENOUS at 11:33

## 2022-01-06 NOTE — PROGRESS NOTES
Pt to Dept for Bamlanivimab-Etesevimab  infusion. AVS printed and reviewed. Fact sheet provided. Pt informed that Rg Stefankler is an unapproved drug that is authorized for use under this Emergency use authorization. Pt verbalizes understanding. Pt kip infusion with no adverse reaction noted and monitored 1 hour post infusion.  Pt discharged home and to follow up with MD

## 2022-01-10 DIAGNOSIS — E78.2 MIXED HYPERLIPIDEMIA: ICD-10-CM

## 2022-01-10 DIAGNOSIS — I10 ESSENTIAL HYPERTENSION: ICD-10-CM

## 2022-01-10 RX ORDER — ATORVASTATIN CALCIUM 80 MG/1
TABLET, FILM COATED ORAL
Qty: 90 TABLET | Refills: 0 | Status: SHIPPED | OUTPATIENT
Start: 2022-01-10 | End: 2022-03-21

## 2022-01-10 RX ORDER — SPIRONOLACTONE 25 MG/1
TABLET ORAL
Qty: 90 TABLET | Refills: 0 | Status: SHIPPED | OUTPATIENT
Start: 2022-01-10 | End: 2022-03-21

## 2022-01-10 RX ORDER — CARVEDILOL 25 MG/1
TABLET ORAL
Qty: 180 TABLET | Refills: 0 | Status: SHIPPED | OUTPATIENT
Start: 2022-01-10 | End: 2022-03-21

## 2022-02-10 ENCOUNTER — OFFICE VISIT (OUTPATIENT)
Dept: INTERNAL MEDICINE CLINIC | Age: 82
End: 2022-02-10
Payer: MEDICARE

## 2022-02-10 VITALS
HEART RATE: 71 BPM | WEIGHT: 233.8 LBS | SYSTOLIC BLOOD PRESSURE: 132 MMHG | OXYGEN SATURATION: 96 % | BODY MASS INDEX: 37.57 KG/M2 | HEIGHT: 66 IN | DIASTOLIC BLOOD PRESSURE: 60 MMHG | RESPIRATION RATE: 17 BRPM

## 2022-02-10 DIAGNOSIS — I10 ESSENTIAL HYPERTENSION: Primary | ICD-10-CM

## 2022-02-10 DIAGNOSIS — E78.2 MIXED HYPERLIPIDEMIA: ICD-10-CM

## 2022-02-10 PROBLEM — E11.9 TYPE 2 DIABETES MELLITUS (HCC): Status: ACTIVE | Noted: 2022-02-10

## 2022-02-10 PROCEDURE — 4040F PNEUMOC VAC/ADMIN/RCVD: CPT | Performed by: INTERNAL MEDICINE

## 2022-02-10 PROCEDURE — G8417 CALC BMI ABV UP PARAM F/U: HCPCS | Performed by: INTERNAL MEDICINE

## 2022-02-10 PROCEDURE — G8484 FLU IMMUNIZE NO ADMIN: HCPCS | Performed by: INTERNAL MEDICINE

## 2022-02-10 PROCEDURE — 99214 OFFICE O/P EST MOD 30 MIN: CPT | Performed by: INTERNAL MEDICINE

## 2022-02-10 PROCEDURE — 1123F ACP DISCUSS/DSCN MKR DOCD: CPT | Performed by: INTERNAL MEDICINE

## 2022-02-10 PROCEDURE — G8427 DOCREV CUR MEDS BY ELIG CLIN: HCPCS | Performed by: INTERNAL MEDICINE

## 2022-02-10 PROCEDURE — 1036F TOBACCO NON-USER: CPT | Performed by: INTERNAL MEDICINE

## 2022-02-10 ASSESSMENT — PATIENT HEALTH QUESTIONNAIRE - PHQ9
2. FEELING DOWN, DEPRESSED OR HOPELESS: 0
SUM OF ALL RESPONSES TO PHQ QUESTIONS 1-9: 0
SUM OF ALL RESPONSES TO PHQ9 QUESTIONS 1 & 2: 0
SUM OF ALL RESPONSES TO PHQ QUESTIONS 1-9: 0
1. LITTLE INTEREST OR PLEASURE IN DOING THINGS: 0
SUM OF ALL RESPONSES TO PHQ QUESTIONS 1-9: 0
SUM OF ALL RESPONSES TO PHQ QUESTIONS 1-9: 0

## 2022-02-10 ASSESSMENT — ENCOUNTER SYMPTOMS
BLOOD IN STOOL: 0
COUGH: 0
ABDOMINAL PAIN: 0
SHORTNESS OF BREATH: 0
SORE THROAT: 0
NAUSEA: 0
VOMITING: 0

## 2022-02-10 NOTE — PROGRESS NOTES
Jenaro Kim (:  1940) is a 80 y.o. male, New patient, here for evaluation of the following chief complaint(s):  Established New Doctor (no concerns )         ASSESSMENT/PLAN:  1. Essential hypertension  - Stable   - Continue same management with carvedilol and diuretics  2. Mixed hyperlipidemia  - Stable   - Continue same management with atorvastatin    Has a follow up with cardiology next month. Return in about 3 months (around 5/10/2022). Subjective   SUBJECTIVE/OBJECTIVE:  Hypertension  This is a chronic problem. The current episode started more than 1 year ago. The problem is controlled. Pertinent negatives include no chest pain, palpitations or shortness of breath. Risk factors for coronary artery disease include dyslipidemia and male gender. Past treatments include diuretics and beta blockers. The current treatment provides significant improvement. There are no compliance problems. Hyperlipidemia  This is a chronic problem. The current episode started more than 1 year ago. The problem is controlled. Pertinent negatives include no chest pain or shortness of breath. Current antihyperlipidemic treatment includes statins. The current treatment provides significant improvement of lipids. There are no compliance problems. Risk factors for coronary artery disease include dyslipidemia, hypertension and male sex. Review of Systems   Constitutional: Negative for fatigue and fever. HENT: Negative for nosebleeds and sore throat. Respiratory: Negative for cough and shortness of breath. Cardiovascular: Negative for chest pain, palpitations and leg swelling. Gastrointestinal: Negative for abdominal pain, blood in stool, nausea and vomiting. Neurological: Negative for dizziness and weakness. Objective   Physical Exam  Constitutional:       Appearance: Normal appearance. HENT:      Head: Normocephalic and atraumatic. Eyes:      General: No scleral icterus. Conjunctiva/sclera: Conjunctivae normal.   Cardiovascular:      Rate and Rhythm: Normal rate and regular rhythm. Pulses: Normal pulses. Heart sounds: Normal heart sounds. Pulmonary:      Effort: Pulmonary effort is normal.      Breath sounds: Normal breath sounds. Musculoskeletal:         General: No swelling. Skin:     General: Skin is warm and dry. Neurological:      Mental Status: He is alert and oriented to person, place, and time. Mental status is at baseline. Psychiatric:         Mood and Affect: Mood normal.         Behavior: Behavior normal.              An electronic signature was used to authenticate this note.     --Rasheed Talley MD

## 2022-03-21 DIAGNOSIS — I10 ESSENTIAL HYPERTENSION: ICD-10-CM

## 2022-03-21 DIAGNOSIS — E78.2 MIXED HYPERLIPIDEMIA: ICD-10-CM

## 2022-03-21 NOTE — TELEPHONE ENCOUNTER
LAST OFFICE VISIT :02/10/2022    Future Appointments   Date Time Provider Jose Figueredo   5/3/2022  8:00 AM Phil Stock MD [facility]

## 2022-03-22 RX ORDER — ATORVASTATIN CALCIUM 80 MG/1
TABLET, FILM COATED ORAL
Qty: 90 TABLET | Refills: 1 | Status: SHIPPED | OUTPATIENT
Start: 2022-03-22 | End: 2022-08-14

## 2022-03-22 RX ORDER — CARVEDILOL 25 MG/1
TABLET ORAL
Qty: 180 TABLET | Refills: 1 | Status: SHIPPED | OUTPATIENT
Start: 2022-03-22 | End: 2022-08-14

## 2022-03-22 RX ORDER — SPIRONOLACTONE 25 MG/1
TABLET ORAL
Qty: 90 TABLET | Refills: 1 | Status: SHIPPED | OUTPATIENT
Start: 2022-03-22 | End: 2022-08-14

## 2022-04-20 ENCOUNTER — TELEPHONE (OUTPATIENT)
Dept: INTERNAL MEDICINE CLINIC | Age: 82
End: 2022-04-20

## 2022-04-20 RX ORDER — MECLIZINE HYDROCHLORIDE 25 MG/1
25 TABLET ORAL 3 TIMES DAILY PRN
Qty: 30 TABLET | Refills: 1 | Status: SHIPPED | OUTPATIENT
Start: 2022-04-20 | End: 2022-05-02

## 2022-05-02 RX ORDER — MECLIZINE HYDROCHLORIDE 25 MG/1
TABLET ORAL
Qty: 30 TABLET | Refills: 2 | Status: SHIPPED | OUTPATIENT
Start: 2022-05-02 | End: 2022-06-10

## 2022-05-02 NOTE — TELEPHONE ENCOUNTER
Last office visit :02/10/2022    Future Appointments   Date Time Provider Jose Figueredo   6/14/2022  8:00 AM Krishna Epps MD [facility]

## 2022-06-10 RX ORDER — MECLIZINE HYDROCHLORIDE 25 MG/1
TABLET ORAL
Qty: 30 TABLET | Refills: 0 | Status: SHIPPED | OUTPATIENT
Start: 2022-06-10

## 2022-06-10 NOTE — TELEPHONE ENCOUNTER
Request antivert last filled 5/2/22                                   Last ov 2/10/22    Next ov 6/14/22

## 2022-06-14 ENCOUNTER — OFFICE VISIT (OUTPATIENT)
Dept: INTERNAL MEDICINE CLINIC | Age: 82
End: 2022-06-14
Payer: MEDICARE

## 2022-06-14 VITALS
RESPIRATION RATE: 16 BRPM | SYSTOLIC BLOOD PRESSURE: 130 MMHG | HEIGHT: 66 IN | OXYGEN SATURATION: 96 % | WEIGHT: 227.8 LBS | DIASTOLIC BLOOD PRESSURE: 70 MMHG | HEART RATE: 67 BPM | BODY MASS INDEX: 36.61 KG/M2

## 2022-06-14 DIAGNOSIS — E78.2 MIXED HYPERLIPIDEMIA: ICD-10-CM

## 2022-06-14 DIAGNOSIS — Z11.59 NEED FOR HEPATITIS C SCREENING TEST: ICD-10-CM

## 2022-06-14 DIAGNOSIS — E11.22 TYPE 2 DIABETES MELLITUS WITH STAGE 3A CHRONIC KIDNEY DISEASE, WITHOUT LONG-TERM CURRENT USE OF INSULIN (HCC): Primary | ICD-10-CM

## 2022-06-14 DIAGNOSIS — I10 ESSENTIAL HYPERTENSION: Primary | ICD-10-CM

## 2022-06-14 DIAGNOSIS — E66.01 SEVERE OBESITY (BMI 35.0-39.9) WITH COMORBIDITY (HCC): ICD-10-CM

## 2022-06-14 DIAGNOSIS — R73.9 HYPERGLYCEMIA: ICD-10-CM

## 2022-06-14 DIAGNOSIS — N18.31 TYPE 2 DIABETES MELLITUS WITH STAGE 3A CHRONIC KIDNEY DISEASE, WITHOUT LONG-TERM CURRENT USE OF INSULIN (HCC): Primary | ICD-10-CM

## 2022-06-14 DIAGNOSIS — N18.31 STAGE 3A CHRONIC KIDNEY DISEASE (HCC): ICD-10-CM

## 2022-06-14 PROBLEM — N18.30 CHRONIC RENAL DISEASE, STAGE III (HCC): Status: ACTIVE | Noted: 2022-06-14

## 2022-06-14 LAB
A/G RATIO: 1.8 (ref 1.1–2.2)
ALBUMIN SERPL-MCNC: 4.2 G/DL (ref 3.4–5)
ALP BLD-CCNC: 72 U/L (ref 40–129)
ALT SERPL-CCNC: 19 U/L (ref 10–40)
ANION GAP SERPL CALCULATED.3IONS-SCNC: 18 MMOL/L (ref 3–16)
AST SERPL-CCNC: 15 U/L (ref 15–37)
BILIRUB SERPL-MCNC: 0.6 MG/DL (ref 0–1)
BUN BLDV-MCNC: 36 MG/DL (ref 7–20)
CALCIUM SERPL-MCNC: 8.8 MG/DL (ref 8.3–10.6)
CHLORIDE BLD-SCNC: 105 MMOL/L (ref 99–110)
CO2: 17 MMOL/L (ref 21–32)
CREAT SERPL-MCNC: 1.3 MG/DL (ref 0.8–1.3)
ESTIMATED AVERAGE GLUCOSE: 145.6 MG/DL
GFR AFRICAN AMERICAN: >60
GFR NON-AFRICAN AMERICAN: 53
GLUCOSE BLD-MCNC: 141 MG/DL (ref 70–99)
HBA1C MFR BLD: 6.7 %
HEPATITIS C ANTIBODY INTERPRETATION: NORMAL
POTASSIUM SERPL-SCNC: 4.3 MMOL/L (ref 3.5–5.1)
SODIUM BLD-SCNC: 140 MMOL/L (ref 136–145)
TOTAL PROTEIN: 6.5 G/DL (ref 6.4–8.2)

## 2022-06-14 PROCEDURE — 1036F TOBACCO NON-USER: CPT | Performed by: INTERNAL MEDICINE

## 2022-06-14 PROCEDURE — 1123F ACP DISCUSS/DSCN MKR DOCD: CPT | Performed by: INTERNAL MEDICINE

## 2022-06-14 PROCEDURE — 36415 COLL VENOUS BLD VENIPUNCTURE: CPT | Performed by: INTERNAL MEDICINE

## 2022-06-14 PROCEDURE — G8417 CALC BMI ABV UP PARAM F/U: HCPCS | Performed by: INTERNAL MEDICINE

## 2022-06-14 PROCEDURE — 99214 OFFICE O/P EST MOD 30 MIN: CPT | Performed by: INTERNAL MEDICINE

## 2022-06-14 PROCEDURE — G8427 DOCREV CUR MEDS BY ELIG CLIN: HCPCS | Performed by: INTERNAL MEDICINE

## 2022-06-14 SDOH — ECONOMIC STABILITY: FOOD INSECURITY: WITHIN THE PAST 12 MONTHS, THE FOOD YOU BOUGHT JUST DIDN'T LAST AND YOU DIDN'T HAVE MONEY TO GET MORE.: NEVER TRUE

## 2022-06-14 SDOH — ECONOMIC STABILITY: FOOD INSECURITY: WITHIN THE PAST 12 MONTHS, YOU WORRIED THAT YOUR FOOD WOULD RUN OUT BEFORE YOU GOT MONEY TO BUY MORE.: NEVER TRUE

## 2022-06-14 ASSESSMENT — ENCOUNTER SYMPTOMS
SHORTNESS OF BREATH: 0
COUGH: 0
ABDOMINAL PAIN: 0
SORE THROAT: 0
BLOOD IN STOOL: 0
NAUSEA: 0
VOMITING: 0

## 2022-06-14 ASSESSMENT — SOCIAL DETERMINANTS OF HEALTH (SDOH): HOW HARD IS IT FOR YOU TO PAY FOR THE VERY BASICS LIKE FOOD, HOUSING, MEDICAL CARE, AND HEATING?: NOT HARD AT ALL

## 2022-06-14 NOTE — PROGRESS NOTES
Xuan Farnsworth (:  1940) is a 80 y.o. male, Established patient, here for evaluation of the following chief complaint(s):  Hypertension         ASSESSMENT/PLAN:  1. Essential hypertension  -     Comprehensive Metabolic Panel; Future  - Stable   - Continue current dose of carvedilol, furosemide and Aldactone  2. Need for hepatitis C screening test  -     Hepatitis C Antibody; Future  3. Hyperglycemia  -     Hemoglobin A1C; Future  4. Severe obesity (BMI 35.0-39. 9) with comorbidity ([facility] [address]  - Stable   - Continue lifestyle modifications with diet and exercise. 5. Stage 3a chronic kidney disease ([facility] [address]  -     Comprehensive Metabolic Panel; Future  6. Mixed hyperlipidemia:  - Stable   - Continue current dose of Atorvastatin    Patient follows with cardiology twice a year for the management of chronic congestive heart failure. Return in about 3 months (around 2022) for Hypertension, Medicare annual wellness visit in 1 week. Subjective   SUBJECTIVE/OBJECTIVE:  Hypertension  This is a chronic problem. The current episode started more than 1 year ago. The problem is controlled. Pertinent negatives include no chest pain, palpitations or shortness of breath. Risk factors for coronary artery disease include dyslipidemia and male gender. Past treatments include diuretics and beta blockers. The current treatment provides significant improvement. There are no compliance problems. Hyperlipidemia  This is a chronic problem. The current episode started more than 1 year ago. The problem is controlled. Pertinent negatives include no chest pain or shortness of breath. Current antihyperlipidemic treatment includes statins. The current treatment provides significant improvement of lipids. There are no compliance problems. Risk factors for coronary artery disease include dyslipidemia, hypertension and male sex. Review of Systems   Constitutional: Negative for fatigue and fever.    HENT: Negative for nosebleeds and sore throat. Respiratory: Negative for cough and shortness of breath. Cardiovascular: Negative for chest pain, palpitations and leg swelling. Gastrointestinal: Negative for abdominal pain, blood in stool, nausea and vomiting. Neurological: Negative for dizziness and weakness. Objective   Physical Exam  Constitutional:       Appearance: Normal appearance. HENT:      Head: Normocephalic and atraumatic. Eyes:      General: No scleral icterus. Conjunctiva/sclera: Conjunctivae normal.   Cardiovascular:      Rate and Rhythm: Normal rate and regular rhythm. Pulses: Normal pulses. Heart sounds: Normal heart sounds. Pulmonary:      Effort: Pulmonary effort is normal.      Breath sounds: Normal breath sounds. Musculoskeletal:         General: No swelling. Skin:     General: Skin is warm and dry. Neurological:      Mental Status: He is alert and oriented to person, place, and time. Mental status is at baseline. Psychiatric:         Mood and Affect: Mood normal.         Behavior: Behavior normal.              An electronic signature was used to authenticate this note.     --Kathy Jovel MD

## 2022-07-15 ENCOUNTER — OFFICE VISIT (OUTPATIENT)
Dept: INTERNAL MEDICINE CLINIC | Age: 82
End: 2022-07-15
Payer: MEDICARE

## 2022-07-15 VITALS
RESPIRATION RATE: 16 BRPM | WEIGHT: 224 LBS | DIASTOLIC BLOOD PRESSURE: 64 MMHG | SYSTOLIC BLOOD PRESSURE: 112 MMHG | TEMPERATURE: 97.8 F | BODY MASS INDEX: 36.15 KG/M2 | HEART RATE: 70 BPM | OXYGEN SATURATION: 95 %

## 2022-07-15 DIAGNOSIS — E78.2 MIXED HYPERLIPIDEMIA: ICD-10-CM

## 2022-07-15 DIAGNOSIS — M54.9 OTHER CHRONIC BACK PAIN: ICD-10-CM

## 2022-07-15 DIAGNOSIS — E11.22 TYPE 2 DIABETES MELLITUS WITH STAGE 3A CHRONIC KIDNEY DISEASE, WITHOUT LONG-TERM CURRENT USE OF INSULIN (HCC): ICD-10-CM

## 2022-07-15 DIAGNOSIS — G89.29 OTHER CHRONIC BACK PAIN: ICD-10-CM

## 2022-07-15 DIAGNOSIS — I10 ESSENTIAL HYPERTENSION: Primary | ICD-10-CM

## 2022-07-15 DIAGNOSIS — N18.31 TYPE 2 DIABETES MELLITUS WITH STAGE 3A CHRONIC KIDNEY DISEASE, WITHOUT LONG-TERM CURRENT USE OF INSULIN (HCC): ICD-10-CM

## 2022-07-15 DIAGNOSIS — E66.01 SEVERE OBESITY (BMI 35.0-39.9) WITH COMORBIDITY (HCC): ICD-10-CM

## 2022-07-15 PROCEDURE — 1036F TOBACCO NON-USER: CPT | Performed by: INTERNAL MEDICINE

## 2022-07-15 PROCEDURE — 3044F HG A1C LEVEL LT 7.0%: CPT | Performed by: INTERNAL MEDICINE

## 2022-07-15 PROCEDURE — 99214 OFFICE O/P EST MOD 30 MIN: CPT | Performed by: INTERNAL MEDICINE

## 2022-07-15 PROCEDURE — G8417 CALC BMI ABV UP PARAM F/U: HCPCS | Performed by: INTERNAL MEDICINE

## 2022-07-15 PROCEDURE — 1123F ACP DISCUSS/DSCN MKR DOCD: CPT | Performed by: INTERNAL MEDICINE

## 2022-07-15 PROCEDURE — G8427 DOCREV CUR MEDS BY ELIG CLIN: HCPCS | Performed by: INTERNAL MEDICINE

## 2022-07-15 RX ORDER — TIZANIDINE 2 MG/1
2 TABLET ORAL NIGHTLY PRN
Qty: 10 TABLET | Refills: 0 | Status: SHIPPED | OUTPATIENT
Start: 2022-07-15

## 2022-07-15 ASSESSMENT — ENCOUNTER SYMPTOMS
SHORTNESS OF BREATH: 0
VOMITING: 0
SORE THROAT: 0
ABDOMINAL PAIN: 0
NAUSEA: 0
COUGH: 0
BLOOD IN STOOL: 0

## 2022-07-15 NOTE — PROGRESS NOTES
Rao Fernandez (:  1940) is a 80 y.o. male, Established patient, here for evaluation of the following chief complaint(s):  Medication Check (Started metformin last month) and Pain (Right side pain lower back/buttocks but can not take diclofenac due to kidneys )         ASSESSMENT/PLAN:  1. Essential hypertension  -     Comprehensive Metabolic Panel; Future  - Stable   - Continue current dose of carvedilol, furosemide and Aldactone  2. Type 2 diabetes mellitus with stage 3a chronic kidney disease, without long term current use of Insulin:  - Stable   - Continue current dose of metformin  3. Severe obesity (BMI 35.0-39. 9) with comorbidity ([address]  - Stable   - Continue lifestyle modifications with diet and exercise. 4.  Mixed hyperlipidemia:  - Stable   - Continue current dose of Atorvastatin  5. Other chronic back pain  -     tiZANidine (ZANAFLEX) 2 MG tablet; Take 1 tablet by mouth nightly as needed (Back pain), Disp-10 tablet, R-0Normal  Advised to take Tylenol as needed for pain  -     Inna Proctor MD, Orthopedic Surgery, [facility]      Patient follows with cardiology twice a year for the management of chronic congestive heart failure. Return in about 3 months (around 10/15/2022) for Hypertension, Medicare annual wellness visit in 2 days. Subjective   SUBJECTIVE/OBJECTIVE:  Hypertension  This is a chronic problem. The current episode started more than 1 year ago. The problem is controlled. Pertinent negatives include no chest pain, palpitations or shortness of breath. Risk factors for coronary artery disease include dyslipidemia and male gender. Past treatments include diuretics and beta blockers. The current treatment provides significant improvement. There are no compliance problems. Hyperlipidemia  This is a chronic problem. The current episode started more than 1 year ago. The problem is controlled. Pertinent negatives include no chest pain or shortness of breath. Current antihyperlipidemic treatment includes statins. The current treatment provides significant improvement of lipids. There are no compliance problems. Risk factors for coronary artery disease include dyslipidemia, hypertension and male sex. Review of Systems   Constitutional:  Negative for fatigue and fever. HENT:  Negative for nosebleeds and sore throat. Respiratory:  Negative for cough and shortness of breath. Cardiovascular:  Negative for chest pain, palpitations and leg swelling. Gastrointestinal:  Negative for abdominal pain, blood in stool, nausea and vomiting. Neurological:  Negative for dizziness and weakness. Objective   Physical Exam  Constitutional:       Appearance: Normal appearance. HENT:      Head: Normocephalic and atraumatic. Eyes:      General: No scleral icterus. Conjunctiva/sclera: Conjunctivae normal.   Cardiovascular:      Rate and Rhythm: Normal rate and regular rhythm. Pulses: Normal pulses. Heart sounds: Normal heart sounds. Pulmonary:      Effort: Pulmonary effort is normal.      Breath sounds: Normal breath sounds. Musculoskeletal:         General: No swelling. Skin:     General: Skin is warm and dry. Neurological:      Mental Status: He is alert and oriented to person, place, and time. Mental status is at baseline. Psychiatric:         Mood and Affect: Mood normal.         Behavior: Behavior normal.            An electronic signature was used to authenticate this note.     --Nayeli Orona MD

## 2022-07-26 ENCOUNTER — OFFICE VISIT (OUTPATIENT)
Dept: ORTHOPEDIC SURGERY | Age: 82
End: 2022-07-26
Payer: MEDICARE

## 2022-07-26 VITALS — HEIGHT: 66 IN | BODY MASS INDEX: 36.16 KG/M2 | WEIGHT: 225 LBS

## 2022-07-26 DIAGNOSIS — M43.06 LUMBAR SPONDYLOLYSIS: ICD-10-CM

## 2022-07-26 DIAGNOSIS — M51.36 DDD (DEGENERATIVE DISC DISEASE), LUMBAR: ICD-10-CM

## 2022-07-26 DIAGNOSIS — M54.50 LUMBAR PAIN: Primary | ICD-10-CM

## 2022-07-26 DIAGNOSIS — M54.50 ACUTE RIGHT-SIDED LOW BACK PAIN, UNSPECIFIED WHETHER SCIATICA PRESENT: ICD-10-CM

## 2022-07-26 PROBLEM — M51.369 DDD (DEGENERATIVE DISC DISEASE), LUMBAR: Status: ACTIVE | Noted: 2022-07-26

## 2022-07-26 PROCEDURE — G8427 DOCREV CUR MEDS BY ELIG CLIN: HCPCS | Performed by: FAMILY MEDICINE

## 2022-07-26 PROCEDURE — 99203 OFFICE O/P NEW LOW 30 MIN: CPT | Performed by: FAMILY MEDICINE

## 2022-07-26 PROCEDURE — L0625 LO FLEX L1-BELOW L5 PRE OTS: HCPCS | Performed by: FAMILY MEDICINE

## 2022-07-26 PROCEDURE — G8417 CALC BMI ABV UP PARAM F/U: HCPCS | Performed by: FAMILY MEDICINE

## 2022-07-26 RX ORDER — METHYLPREDNISOLONE 4 MG/1
TABLET ORAL
Qty: 21 KIT | Refills: 0 | Status: ON HOLD | OUTPATIENT
Start: 2022-07-26 | End: 2022-09-19 | Stop reason: HOSPADM

## 2022-07-26 RX ORDER — GABAPENTIN 100 MG/1
CAPSULE ORAL
Qty: 180 CAPSULE | Refills: 1 | Status: SHIPPED | OUTPATIENT
Start: 2022-07-26 | End: 2022-09-25 | Stop reason: SDUPTHER

## 2022-07-26 SDOH — HEALTH STABILITY: PHYSICAL HEALTH: ON AVERAGE, HOW MANY DAYS PER WEEK DO YOU ENGAGE IN MODERATE TO STRENUOUS EXERCISE (LIKE A BRISK WALK)?: 6 DAYS

## 2022-07-26 SDOH — HEALTH STABILITY: PHYSICAL HEALTH: ON AVERAGE, HOW MANY MINUTES DO YOU ENGAGE IN EXERCISE AT THIS LEVEL?: 40 MIN

## 2022-07-26 ASSESSMENT — SOCIAL DETERMINANTS OF HEALTH (SDOH)
WITHIN THE LAST YEAR, HAVE YOU BEEN HUMILIATED OR EMOTIONALLY ABUSED IN OTHER WAYS BY YOUR PARTNER OR EX-PARTNER?: NO
WITHIN THE LAST YEAR, HAVE YOU BEEN KICKED, HIT, SLAPPED, OR OTHERWISE PHYSICALLY HURT BY YOUR PARTNER OR EX-PARTNER?: NO
WITHIN THE LAST YEAR, HAVE YOU BEEN AFRAID OF YOUR PARTNER OR EX-PARTNER?: NO
WITHIN THE LAST YEAR, HAVE TO BEEN RAPED OR FORCED TO HAVE ANY KIND OF SEXUAL ACTIVITY BY YOUR PARTNER OR EX-PARTNER?: NO

## 2022-07-26 NOTE — PROGRESS NOTES
Chief Complaint  Back Pain (NP R HIP/LUMBAR)      Initial consultation symptomatic and worsening right-sided mechanical low back pain with difficulty walking and positional changes    History of Present Illness:  Blas Walton is a 80 y.o. male who is a very pleasant white male originally from [facility] who still works in  at [employer] who is a very nice patient of Dr. Daniela Peck being seen today in kind consultation from Dr. Daniela Peck for evaluation of symptomatically worsening pain to his right lower lumbar spine and gluteal region for the past 3 to 4 years. He does not recall any specific history of injury or new activity prior to becoming symptomatic and was well controlled for a number of years while taking oral diclofenac. He has been serially monitored and was found to have a decrease in his GFR therefore they did stop his diclofenac last year with worsening of his pain symptoms. He does have difficulty with prolonged sitting and positional changes as well as prolonged walking and does have occasional sharp pain with resting. He is not really complaining of radiation of pain and describes his current symptoms as more of a dull ache with occasional sharper pain at 5-6 out of 10. He has been trying to supplement with Tylenol and Voltaren gel which is a bit of limited effectiveness but has not had recent imaging or therapy or bracing. He is not really complaining of definitive groin pain. Denies neurogenic bowel or bladder symptoms. He is being seen today for orthopedic and sports consultation with initial imaging. Medical History  Patient's medications, allergies, past medical, surgical, social and family histories were reviewed and updated as appropriate. Review of Systems  Pertinent items are noted in HPI  Review of systems reviewed from Patient History Form dated on 7/26/2022 and available in the patient's chart under the Media tab.        Vital Signs  There were no vitals filed for this visit. General Exam:     Constitutional: Patient is adequately groomed with no evidence of malnutrition  DTRs: Deep tendon reflexes are intact  Mental Status: The patient is oriented to time, place and person. The patient's mood and affect are appropriate. Lymphatic: The lymphatic examination bilaterally reveals all areas to be without enlargement or induration. Vascular: Examination reveals no swelling or calf tenderness. Peripheral pulses are palpable and 2+. Neurological: The patient has good coordination. There is no weakness or sensory deficit. Lumbar/Sacral Spine Examination  Inspection: There is no high-grade deformity or substantial soft tissue swelling. No palpable spasm. Minimal kyphosis. Palpation: Does have most of his clinical tenderness over the right greater left lumbar paraspinals lower lumbar facets with central gluteal tenderness right only. He does not exhibit a great deal of trochanteric or IT band discomfort but is quite tight. Rang of Motion: He is only able to forward flex to about 30 to 40 degrees. Extension is to about neutral and extension to the right does produce most of his gluteal pain but does not radiate pain to his lower extremity. He has a least a 50% reduction in lateral bending rotation. Strength: There does not appear to be focal lower extremity motor deficits. Special Tests: I would call his straight leg raising and logroll testing negative in the office today. Skin: There are no rashes, ulcerations or lesions. Distal motor sensory and vascular exams intact. Gait: Fluid smooth gait    Reflexes:  Symmetrically preserved     Additional Comments:     Additional Examinations:  Right Lower Extremity: Examination of the right lower extremity does not show any tenderness, deformity or injury. Range of motion is unremarkable. There is no gross instability. There are no rashes, ulcerations or lesions.   Strength and tone are normal.  Left Lower Extremity: Examination of the left lower extremity does not show any tenderness, deformity or injury. Range of motion is unremarkable. There is no gross instability. There are no rashes, ulcerations or lesions. Strength and tone are normal.      Diagnostic Test Findings: AP and lateral lumbar spine films were obtained today and does show lower level degenerative disc disease with low-grade spondylolisthesis and facet arthropathy. Visualized portion of his hip joints shows only mild arthritic change. Assessment: #1.  3 to 4 years status post symptomatically worsening mechanical low back pain with lumbar degenerative disc disease and spondylolysis without high-grade radiculopathy    Impression:  Encounter Diagnoses   Name Primary? Lumbar pain Yes    DDD (degenerative disc disease), lumbar     Lumbar spondylolysis     Acute right-sided low back pain, unspecified whether sciatica present        Office Procedures:  Orders Placed This Encounter   Procedures    XR LUMBAR SPINE (2-3 VIEWS)     Standing Status:   Future     Number of Occurrences:   1     Standing Expiration Date:   7/26/2023     Order Specific Question:   Reason for exam:     Answer:   pain    Ambulatory referral to Physical Therapy     Referral Priority:   Routine     Referral Type:   Eval and Treat     Referral Reason:   Specialty Services Required     Requested Specialty:   Physical Therapist     Number of Visits Requested:   1    Bird and Jacklyn Extensor Lumbosacral Support Brace (Warm and Form)     Patient was prescribed a Bird and Jacklyn Extensor Lumbosacral Support Brace with a pocket.   This orthosis is required for the following reasons:    Reduce pain by restricting mobility of the trunk  Facilitate healing following an injury to the spine or related soft tissues  Support weak spinal muscles    The patient was educated and fit by a healthcare professional with expert knowledge and specialization in brace application while under the direct supervision of the treating physician. Verbal and written instructions for the use of and application of this item were provided. They were instructed to contact the office immediately should the brace result in increased pain, decreased sensation, increased swelling or worsening of the condition. Treatment Plan:  Treatment options were discussed with [facility]. We did review his current plain films and exam findings. He does have underlying lumbar spondylosis with multilevel degenerative disc disease and is having most of his pain which is fairly consistent to the right lower lumbar spine and gluteal region without yue radiculopathy. We discussed further work-up with imaging however he has had little in the way of treatment. We did place him on a Medrol Dosepak and I would recommend that he continues with Voltaren gel as he does have a decrease in his GFR recently on blood test.  We will also see how he does with a very low-dose gabapentin trial of 100 mg escalating to 3 times daily I placed him in a warm-and-form belt. We will have him start physical therapy for core strengthening and flexibility. We will see him back in about 2 weeks to see how he is doing as they do anticipate leaving to visit family in [facility] on 8/16/2022 may consider imaging prior to his trip. They will contact us sooner with questions or concerns. This dictation was performed with a verbal recognition program (DRAGON) and it was checked for errors. It is possible that there are still dictated errors within this office note. If so, please bring any errors to my attention for an addendum. All efforts were made to ensure that this office note is accurate.

## 2022-08-05 ENCOUNTER — TELEMEDICINE (OUTPATIENT)
Dept: INTERNAL MEDICINE CLINIC | Age: 82
End: 2022-08-05
Payer: MEDICARE

## 2022-08-05 DIAGNOSIS — Z00.00 MEDICARE ANNUAL WELLNESS VISIT, SUBSEQUENT: Primary | ICD-10-CM

## 2022-08-05 PROCEDURE — 1123F ACP DISCUSS/DSCN MKR DOCD: CPT | Performed by: INTERNAL MEDICINE

## 2022-08-05 PROCEDURE — G0439 PPPS, SUBSEQ VISIT: HCPCS | Performed by: INTERNAL MEDICINE

## 2022-08-05 ASSESSMENT — LIFESTYLE VARIABLES
HOW OFTEN DO YOU HAVE A DRINK CONTAINING ALCOHOL: MONTHLY OR LESS
HOW MANY STANDARD DRINKS CONTAINING ALCOHOL DO YOU HAVE ON A TYPICAL DAY: 1 OR 2

## 2022-08-05 ASSESSMENT — PATIENT HEALTH QUESTIONNAIRE - PHQ9
1. LITTLE INTEREST OR PLEASURE IN DOING THINGS: 0
SUM OF ALL RESPONSES TO PHQ QUESTIONS 1-9: 0
SUM OF ALL RESPONSES TO PHQ9 QUESTIONS 1 & 2: 0
2. FEELING DOWN, DEPRESSED OR HOPELESS: 0
SUM OF ALL RESPONSES TO PHQ QUESTIONS 1-9: 0

## 2022-08-05 NOTE — PROGRESS NOTES
Medicare Annual Wellness Visit    Maxine Palma is here for Medicare AWV    Assessment & Plan   Medicare annual wellness visit, subsequent    Recommendations for Preventive Services Due: see orders and patient instructions/AVS.  Recommended screening schedule for the next 5-10 years is provided to the patient in written form: see Patient Instructions/AVS.     Return for Medicare Annual Wellness Visit in 1 year. Subjective       Patient's complete Health Risk Assessment and screening values have been reviewed and are found in Flowsheets. The following problems were reviewed today and where indicated follow up appointments were made and/or referrals ordered. Positive Risk Factor Screenings with Interventions:     Cognitive:   Words recalled: 0 Words Recalled  Total Score Interpretation: Abnormal Mini-Cog  Did the patient refuse to take the cognition test?: No  Cognitive Impairment Interventions:  Patient declines any further evaluation/treatment for cognitive impairment           General Health and ACP:  General  In general, how would you say your health is?: Good  In the past 7 days, have you experienced any of the following: New or Increased Pain, New or Increased Fatigue, Loneliness, Social Isolation, Stress or Anger?: No  Do you get the social and emotional support that you need?: Yes  Do you have a Living Will?: Yes    Advance Directives       Power of  Living Will ACP-Advance Directive ACP-Power of     Not on File Not on File Not on File Not on File          General Health Risk Interventions:  None    Health Habits/Nutrition:  Physical Activity: Sufficiently Active    Days of Exercise per Week: 7 days    Minutes of Exercise per Session: 40 min     Have you lost any weight without trying in the past 3 months?: No     Have you seen the dentist within the past year?: N/A - wear dentures  Health Habits/Nutrition Interventions:  None    Hearing/Vision:  Do you or your family notice any trouble with your hearing that hasn't been managed with hearing aids?: No  Do you have difficulty driving, watching TV, or doing any of your daily activities because of your eyesight?: (!) Yes  Have you had an eye exam within the past year?: (!) No  No results found. Hearing/Vision Interventions:  Vision concerns:  patient encouraged to make appointment with his/her eye specialist            Objective      Patient-Reported Vitals  No data recorded          No Known Allergies  Prior to Visit Medications    Medication Sig Taking? Authorizing Provider   methylPREDNISolone (MEDROL DOSEPACK) 4 MG tablet Take by mouth as directed. Yes Landrum Epley, MD   gabapentin (NEURONTIN) 100 MG capsule Take one tablet once daily for 2 days. Then take one tablet twice daily for 2 days.  Then take one tablet three times daily for rest Yes Landrum Epley, MD   diclofenac sodium (VOLTAREN) 1 % GEL Apply 4 g topically 4 times daily as needed for Pain Yes Landrum Epley, MD   tiZANidine (ZANAFLEX) 2 MG tablet Take 1 tablet by mouth nightly as needed (Back pain) Yes Beatrice Schmitt MD   metFORMIN (GLUCOPHAGE) 500 MG tablet Take 1 tablet by mouth daily (with breakfast) Yes Beatrice Schmitt MD   meclizine (ANTIVERT) 25 MG tablet TAKE 1 TABLET THREE TIMES DAILY AS NEEDED FOR  DIZZINESS Yes ARACELY Gonzalez   carvedilol (COREG) 25 MG tablet TAKE 1 TABLET TWICE DAILY WITH MEALS Yes Beatrice Schmitt MD   atorvastatin (LIPITOR) 80 MG tablet TAKE 1 TABLET EVERY DAY Yes Beatrice Schmitt MD   spironolactone (ALDACTONE) 25 MG tablet TAKE 1 TABLET EVERY DAY Yes Beatrice Schmitt MD   amiodarone (CORDARONE) 200 MG tablet TAKE 1 TABLET EVERY DAY Yes Betsey Ewing MD   furosemide (LASIX) 20 MG tablet Take 10 mg by mouth daily Yes Historical Provider, MD   omeprazole (PRILOSEC) 20 MG delayed release capsule Take 20 mg by mouth daily Yes Historical Provider, MD   aspirin EC 81 MG EC tablet Take 1 tablet by mouth daily. Yes Carla Kraft MD       CareTeam (Including outside providers/suppliers regularly involved in providing care):   Patient Care Team:  Dorothy York MD as PCP - General (Internal Medicine)  Dorothy York MD as PCP [facility]     Reviewed and updated this visit:  Tobacco  Allergies  Meds  Problems  Med Hx  Surg Hx  Soc Hx  Fam Hx            Nancey Brine, was evaluated through a synchronous (real-time) audio-video encounter. The patient (or guardian if applicable) is aware that this is a billable service, which includes applicable co-pays. This Virtual Visit was conducted with patient's (and/or legal guardian's) consent. The visit was conducted pursuant to the emergency declaration under the [facility], [address] [place] [address] waiver authority and the [employer] Act. Patient identification was verified, and a caregiver was present when appropriate. The patient was located at Home: [address] [place] [address]. Provider was located at [facility] (Appt Dept): [address] [facility]  [address] [facility] [address]  [place],  [address] [facility]

## 2022-08-05 NOTE — PATIENT INSTRUCTIONS
Personalized Preventive Plan for Sarmad Mercedes - 8/5/2022  Medicare offers a range of preventive health benefits. Some of the tests and screenings are paid in full while other may be subject to a deductible, co-insurance, and/or copay. Some of these benefits include a comprehensive review of your medical history including lifestyle, illnesses that may run in your family, and various assessments and screenings as appropriate. After reviewing your medical record and screening and assessments performed today your provider may have ordered immunizations, labs, imaging, and/or referrals for you. A list of these orders (if applicable) as well as your Preventive Care list are included within your After Visit Summary for your review. Other Preventive Recommendations:    A preventive eye exam performed by an eye specialist is recommended every 1-2 years to screen for glaucoma; cataracts, macular degeneration, and other eye disorders. A preventive dental visit is recommended every 6 months. Try to get at least 150 minutes of exercise per week or 10,000 steps per day on a pedometer . Order or download the FREE \"Exercise & Physical Activity: Your Everyday Guide\" from [employer]. Call 7-750.513.1387 or search [employer] online. You need 2362-9188 mg of calcium and 4888-8526 IU of vitamin D per day. It is possible to meet your calcium requirement with diet alone, but a vitamin D supplement is usually necessary to meet this goal.  When exposed to the sun, use a sunscreen that protects against both UVA and UVB radiation with an SPF of 30 or greater. Reapply every 2 to 3 hours or after sweating, drying off with a towel, or swimming. Always wear a seat belt when traveling in a car. Always wear a helmet when riding a bicycle or motorcycle.

## 2022-08-10 DIAGNOSIS — I10 ESSENTIAL HYPERTENSION: ICD-10-CM

## 2022-08-10 DIAGNOSIS — E78.2 MIXED HYPERLIPIDEMIA: ICD-10-CM

## 2022-08-12 NOTE — TELEPHONE ENCOUNTER
Last office visit 6/14/22      Future Appointments   Date Time Provider Jose Bea   9/14/2022  8:00 AM Dorothea Contreras MD [employer]   10/17/2022  7:45 AM Dorothea Contreras MD [facility] [place]

## 2022-08-14 RX ORDER — ATORVASTATIN CALCIUM 80 MG/1
TABLET, FILM COATED ORAL
Qty: 90 TABLET | Refills: 1 | Status: SHIPPED | OUTPATIENT
Start: 2022-08-14

## 2022-08-14 RX ORDER — SPIRONOLACTONE 25 MG/1
TABLET ORAL
Qty: 90 TABLET | Refills: 1 | Status: SHIPPED | OUTPATIENT
Start: 2022-08-14

## 2022-08-14 RX ORDER — CARVEDILOL 25 MG/1
TABLET ORAL
Qty: 180 TABLET | Refills: 1 | Status: SHIPPED | OUTPATIENT
Start: 2022-08-14

## 2022-09-14 ENCOUNTER — OFFICE VISIT (OUTPATIENT)
Dept: INTERNAL MEDICINE CLINIC | Age: 82
End: 2022-09-14
Payer: MEDICARE

## 2022-09-14 VITALS
SYSTOLIC BLOOD PRESSURE: 122 MMHG | OXYGEN SATURATION: 99 % | BODY MASS INDEX: 36 KG/M2 | DIASTOLIC BLOOD PRESSURE: 60 MMHG | HEIGHT: 66 IN | TEMPERATURE: 97.2 F | HEART RATE: 70 BPM | WEIGHT: 224 LBS

## 2022-09-14 DIAGNOSIS — I10 ESSENTIAL HYPERTENSION: ICD-10-CM

## 2022-09-14 DIAGNOSIS — E66.01 SEVERE OBESITY (BMI 35.0-39.9) WITH COMORBIDITY (HCC): ICD-10-CM

## 2022-09-14 DIAGNOSIS — I50.32 CHRONIC DIASTOLIC CHF (CONGESTIVE HEART FAILURE) (HCC): ICD-10-CM

## 2022-09-14 DIAGNOSIS — I48.0 PAROXYSMAL ATRIAL FIBRILLATION (HCC): ICD-10-CM

## 2022-09-14 DIAGNOSIS — N18.31 TYPE 2 DIABETES MELLITUS WITH STAGE 3A CHRONIC KIDNEY DISEASE, WITHOUT LONG-TERM CURRENT USE OF INSULIN (HCC): Primary | ICD-10-CM

## 2022-09-14 DIAGNOSIS — D69.6 THROMBOCYTOPENIA, UNSPECIFIED (HCC): ICD-10-CM

## 2022-09-14 DIAGNOSIS — E78.2 MIXED HYPERLIPIDEMIA: ICD-10-CM

## 2022-09-14 DIAGNOSIS — E11.22 TYPE 2 DIABETES MELLITUS WITH STAGE 3A CHRONIC KIDNEY DISEASE, WITHOUT LONG-TERM CURRENT USE OF INSULIN (HCC): Primary | ICD-10-CM

## 2022-09-14 LAB — HBA1C MFR BLD: 6.5 %

## 2022-09-14 PROCEDURE — 83036 HEMOGLOBIN GLYCOSYLATED A1C: CPT | Performed by: INTERNAL MEDICINE

## 2022-09-14 PROCEDURE — G0008 ADMIN INFLUENZA VIRUS VAC: HCPCS | Performed by: INTERNAL MEDICINE

## 2022-09-14 PROCEDURE — 1036F TOBACCO NON-USER: CPT | Performed by: INTERNAL MEDICINE

## 2022-09-14 PROCEDURE — 99215 OFFICE O/P EST HI 40 MIN: CPT | Performed by: INTERNAL MEDICINE

## 2022-09-14 PROCEDURE — G8427 DOCREV CUR MEDS BY ELIG CLIN: HCPCS | Performed by: INTERNAL MEDICINE

## 2022-09-14 PROCEDURE — 3044F HG A1C LEVEL LT 7.0%: CPT | Performed by: INTERNAL MEDICINE

## 2022-09-14 PROCEDURE — 90694 VACC AIIV4 NO PRSRV 0.5ML IM: CPT | Performed by: INTERNAL MEDICINE

## 2022-09-14 PROCEDURE — G8417 CALC BMI ABV UP PARAM F/U: HCPCS | Performed by: INTERNAL MEDICINE

## 2022-09-14 PROCEDURE — 1123F ACP DISCUSS/DSCN MKR DOCD: CPT | Performed by: INTERNAL MEDICINE

## 2022-09-14 ASSESSMENT — ENCOUNTER SYMPTOMS
NAUSEA: 0
BLOOD IN STOOL: 0
COUGH: 0
SORE THROAT: 0
SHORTNESS OF BREATH: 0
ABDOMINAL PAIN: 0
VOMITING: 0

## 2022-09-14 NOTE — PROGRESS NOTES
Donna Claros (:  1940) is a 80 y.o. male, Established patient, here for evaluation of the following chief complaint(s):  Follow-up, Hypertension, and Diabetes         ASSESSMENT/PLAN:  1. Type 2 diabetes mellitus with stage 3a chronic kidney disease, without long-term current use of insulin (HCC)  -Point of care hemoglobin A1c - 6.5 today  - Stable   - Continue current dose of metformin. Starting on Jardiance for diabetes control and management of chronic heart failure. -     empagliflozin (JARDIANCE) 10 MG tablet; Take 1 tablet by mouth daily, Disp-30 tablet, R-3Normal  2. Essential hypertension  - Stable   - Continue current dose of carvedilol, furosemide and Aldactone  3. Severe obesity (BMI 35.0-39. 9) with comorbidity ([address]  - Stable   - Continue lifestyle modifications with regular exercise and diabetic diet. 4. Mixed hyperlipidemia  - Stable   - Continue current dose of Atorvastatin  5. Chronic diastolic CHF (congestive heart failure) (HCC)  - Stable   -Follows with cardiology  - Continue current dose of carvedilol, Aldactone, furosemide. Starting on Jardiance for heart failure and diabetes management. -     empagliflozin (JARDIANCE) 10 MG tablet; Take 1 tablet by mouth daily, Disp-30 tablet, R-3Normal  6. Paroxysmal atrial fibrillation (HCC)  - Stable   Follows with cardiology. 7. Thrombocytopenia, unspecified (HCC)  Stable. Continue monitoring blood count. Patient follows with cardiology twice a year for the management of chronic congestive heart failure and paroxysmal atrial fibrillation. Return in about 3 months (around 2022). Subjective   SUBJECTIVE/OBJECTIVE:  Hypertension  This is a chronic problem. The current episode started more than 1 year ago. The problem is controlled. Pertinent negatives include no chest pain, palpitations or shortness of breath. Risk factors for coronary artery disease include dyslipidemia and male gender.  Past treatments include diuretics and beta blockers. The current treatment provides significant improvement. There are no compliance problems. Hyperlipidemia  This is a chronic problem. The current episode started more than 1 year ago. The problem is controlled. Pertinent negatives include no chest pain or shortness of breath. Current antihyperlipidemic treatment includes statins. The current treatment provides significant improvement of lipids. There are no compliance problems. Risk factors for coronary artery disease include dyslipidemia, hypertension and male sex. Diabetes  He presents for his follow-up diabetic visit. He has type 2 diabetes mellitus. His disease course has been stable. Pertinent negatives for hypoglycemia include no dizziness. Pertinent negatives for diabetes include no chest pain, no fatigue and no weakness. Diabetic complications include heart disease and nephropathy. Risk factors for coronary artery disease include diabetes mellitus, hypertension and male sex. Current diabetic treatment includes oral agent (monotherapy). He is compliant with treatment all of the time. He is following a diabetic diet. He participates in exercise intermittently. An ACE inhibitor/angiotensin II receptor blocker is not being taken. Review of Systems   Constitutional:  Negative for fatigue and fever. HENT:  Negative for nosebleeds and sore throat. Respiratory:  Negative for cough and shortness of breath. Cardiovascular:  Negative for chest pain, palpitations and leg swelling. Gastrointestinal:  Negative for abdominal pain, blood in stool, nausea and vomiting. Neurological:  Negative for dizziness and weakness. Objective   Physical Exam  Constitutional:       Appearance: Normal appearance. HENT:      Head: Normocephalic and atraumatic. Eyes:      General: No scleral icterus. Conjunctiva/sclera: Conjunctivae normal.   Cardiovascular:      Rate and Rhythm: Normal rate and regular rhythm.       Pulses: Normal pulses. Heart sounds: Normal heart sounds. Pulmonary:      Effort: Pulmonary effort is normal.      Breath sounds: Normal breath sounds. Musculoskeletal:         General: No swelling. Skin:     General: Skin is warm and dry. Neurological:      Mental Status: He is alert and oriented to person, place, and time. Mental status is at baseline. Psychiatric:         Mood and Affect: Mood normal.         Behavior: Behavior normal.        During this visit I have spent 40 minutes reviewing previous notes, test results, medications and to perform face to face encounter with the patient discussing the diagnosis, lab results, medications and importance of compliance with the treatment plan as well as to complete documentation in electronic health records. An electronic signature was used to authenticate this note.     --Judy Oviedo MD

## 2022-09-17 ENCOUNTER — APPOINTMENT (OUTPATIENT)
Dept: GENERAL RADIOLOGY | Age: 82
DRG: 291 | End: 2022-09-17
Payer: MEDICARE

## 2022-09-17 ENCOUNTER — APPOINTMENT (OUTPATIENT)
Dept: CT IMAGING | Age: 82
DRG: 291 | End: 2022-09-17
Payer: MEDICARE

## 2022-09-17 ENCOUNTER — HOSPITAL ENCOUNTER (INPATIENT)
Age: 82
LOS: 1 days | Discharge: HOME | DRG: 291 | End: 2022-09-19
Attending: STUDENT IN AN ORGANIZED HEALTH CARE EDUCATION/TRAINING PROGRAM | Admitting: INTERNAL MEDICINE
Payer: MEDICARE

## 2022-09-17 DIAGNOSIS — I50.9 CONGESTIVE HEART FAILURE, UNSPECIFIED HF CHRONICITY, UNSPECIFIED HEART FAILURE TYPE (HCC): ICD-10-CM

## 2022-09-17 DIAGNOSIS — R09.02 HYPOXIA: ICD-10-CM

## 2022-09-17 DIAGNOSIS — J81.0 ACUTE PULMONARY EDEMA (HCC): Primary | ICD-10-CM

## 2022-09-17 LAB
A/G RATIO: 1.6 (ref 1.1–2.2)
ALBUMIN SERPL-MCNC: 4.7 G/DL (ref 3.4–5)
ALP BLD-CCNC: 94 U/L (ref 40–129)
ALT SERPL-CCNC: 28 U/L (ref 10–40)
ANION GAP SERPL CALCULATED.3IONS-SCNC: 15 MMOL/L (ref 3–16)
AST SERPL-CCNC: 21 U/L (ref 15–37)
BASE EXCESS VENOUS: -4.3 MMOL/L (ref -2–3)
BASOPHILS ABSOLUTE: 0.1 K/UL (ref 0–0.2)
BASOPHILS RELATIVE PERCENT: 0.6 %
BILIRUB SERPL-MCNC: 0.7 MG/DL (ref 0–1)
BILIRUBIN URINE: NEGATIVE
BLOOD, URINE: NEGATIVE
BUN BLDV-MCNC: 26 MG/DL (ref 7–20)
CALCIUM SERPL-MCNC: 9.2 MG/DL (ref 8.3–10.6)
CARBOXYHEMOGLOBIN: 0.6 % (ref 0–1.5)
CHLORIDE BLD-SCNC: 101 MMOL/L (ref 99–110)
CLARITY: CLEAR
CO2: 22 MMOL/L (ref 21–32)
COLOR: YELLOW
CREAT SERPL-MCNC: 1.6 MG/DL (ref 0.8–1.3)
EOSINOPHILS ABSOLUTE: 0.4 K/UL (ref 0–0.6)
EOSINOPHILS RELATIVE PERCENT: 2.7 %
GFR AFRICAN AMERICAN: 51
GFR NON-AFRICAN AMERICAN: 42
GLUCOSE BLD-MCNC: 296 MG/DL (ref 70–99)
GLUCOSE URINE: NEGATIVE MG/DL
HCO3 VENOUS: 25 MMOL/L (ref 24–28)
HCT VFR BLD CALC: 49 % (ref 40.5–52.5)
HEMOGLOBIN, VEN, REDUCED: 10.1 %
HEMOGLOBIN: 16.5 G/DL (ref 13.5–17.5)
INR BLD: 1.11 (ref 0.87–1.14)
KETONES, URINE: NEGATIVE MG/DL
LACTIC ACID: 2.2 MMOL/L (ref 0.4–2)
LEUKOCYTE ESTERASE, URINE: NEGATIVE
LYMPHOCYTES ABSOLUTE: 4.1 K/UL (ref 1–5.1)
LYMPHOCYTES RELATIVE PERCENT: 31.5 %
MCH RBC QN AUTO: 31.6 PG (ref 26–34)
MCHC RBC AUTO-ENTMCNC: 33.7 G/DL (ref 31–36)
MCV RBC AUTO: 93.8 FL (ref 80–100)
METHEMOGLOBIN VENOUS: 0 % (ref 0–1.5)
MICROSCOPIC EXAMINATION: NORMAL
MONOCYTES ABSOLUTE: 1.2 K/UL (ref 0–1.3)
MONOCYTES RELATIVE PERCENT: 9.6 %
NEUTROPHILS ABSOLUTE: 7.2 K/UL (ref 1.7–7.7)
NEUTROPHILS RELATIVE PERCENT: 55.6 %
NITRITE, URINE: NEGATIVE
O2 SAT, VEN: 90 %
PCO2, VEN: 61.4 MMHG (ref 41–51)
PDW BLD-RTO: 14 % (ref 12.4–15.4)
PH UA: 6 (ref 5–8)
PH VENOUS: 7.22 (ref 7.35–7.45)
PLATELET # BLD: 222 K/UL (ref 135–450)
PMV BLD AUTO: 10 FL (ref 5–10.5)
PO2, VEN: 70.9 MMHG (ref 25–40)
POTASSIUM REFLEX MAGNESIUM: 4.9 MMOL/L (ref 3.5–5.1)
PRO-BNP: 1470 PG/ML (ref 0–449)
PROTEIN UA: NEGATIVE MG/DL
PROTHROMBIN TIME: 14.3 SEC (ref 11.7–14.5)
RBC # BLD: 5.23 M/UL (ref 4.2–5.9)
SODIUM BLD-SCNC: 138 MMOL/L (ref 136–145)
SPECIFIC GRAVITY UA: 1.02 (ref 1–1.03)
TCO2 CALC VENOUS: 27 MMOL/L
TOTAL PROTEIN: 7.7 G/DL (ref 6.4–8.2)
TROPONIN: <0.01 NG/ML
URINE TYPE: NORMAL
UROBILINOGEN, URINE: 0.2 E.U./DL
WBC # BLD: 13 K/UL (ref 4–11)

## 2022-09-17 PROCEDURE — 6370000000 HC RX 637 (ALT 250 FOR IP): Performed by: STUDENT IN AN ORGANIZED HEALTH CARE EDUCATION/TRAINING PROGRAM

## 2022-09-17 PROCEDURE — 96375 TX/PRO/DX INJ NEW DRUG ADDON: CPT

## 2022-09-17 PROCEDURE — 96374 THER/PROPH/DIAG INJ IV PUSH: CPT

## 2022-09-17 PROCEDURE — 2700000000 HC OXYGEN THERAPY PER DAY

## 2022-09-17 PROCEDURE — 71275 CT ANGIOGRAPHY CHEST: CPT

## 2022-09-17 PROCEDURE — 2500000003 HC RX 250 WO HCPCS: Performed by: STUDENT IN AN ORGANIZED HEALTH CARE EDUCATION/TRAINING PROGRAM

## 2022-09-17 PROCEDURE — 94761 N-INVAS EAR/PLS OXIMETRY MLT: CPT

## 2022-09-17 PROCEDURE — 93005 ELECTROCARDIOGRAM TRACING: CPT | Performed by: STUDENT IN AN ORGANIZED HEALTH CARE EDUCATION/TRAINING PROGRAM

## 2022-09-17 PROCEDURE — 83605 ASSAY OF LACTIC ACID: CPT

## 2022-09-17 PROCEDURE — 94660 CPAP INITIATION&MGMT: CPT

## 2022-09-17 PROCEDURE — 82803 BLOOD GASES ANY COMBINATION: CPT

## 2022-09-17 PROCEDURE — 85025 COMPLETE CBC W/AUTO DIFF WBC: CPT

## 2022-09-17 PROCEDURE — 84145 PROCALCITONIN (PCT): CPT

## 2022-09-17 PROCEDURE — 71045 X-RAY EXAM CHEST 1 VIEW: CPT

## 2022-09-17 PROCEDURE — 6360000004 HC RX CONTRAST MEDICATION: Performed by: STUDENT IN AN ORGANIZED HEALTH CARE EDUCATION/TRAINING PROGRAM

## 2022-09-17 PROCEDURE — 87086 URINE CULTURE/COLONY COUNT: CPT

## 2022-09-17 PROCEDURE — 85610 PROTHROMBIN TIME: CPT

## 2022-09-17 PROCEDURE — 6360000002 HC RX W HCPCS: Performed by: STUDENT IN AN ORGANIZED HEALTH CARE EDUCATION/TRAINING PROGRAM

## 2022-09-17 PROCEDURE — 81003 URINALYSIS AUTO W/O SCOPE: CPT

## 2022-09-17 PROCEDURE — 99285 EMERGENCY DEPT VISIT HI MDM: CPT

## 2022-09-17 PROCEDURE — 80053 COMPREHEN METABOLIC PANEL: CPT

## 2022-09-17 PROCEDURE — 84484 ASSAY OF TROPONIN QUANT: CPT

## 2022-09-17 PROCEDURE — 36415 COLL VENOUS BLD VENIPUNCTURE: CPT

## 2022-09-17 PROCEDURE — 83880 ASSAY OF NATRIURETIC PEPTIDE: CPT

## 2022-09-17 RX ORDER — NITROGLYCERIN 20 MG/100ML
5 INJECTION INTRAVENOUS CONTINUOUS
Status: DISCONTINUED | OUTPATIENT
Start: 2022-09-17 | End: 2022-09-18

## 2022-09-17 RX ORDER — FUROSEMIDE 10 MG/ML
40 INJECTION INTRAMUSCULAR; INTRAVENOUS ONCE
Status: COMPLETED | OUTPATIENT
Start: 2022-09-17 | End: 2022-09-17

## 2022-09-17 RX ORDER — NITROGLYCERIN 0.4 MG/1
0.4 TABLET SUBLINGUAL EVERY 5 MIN PRN
Status: DISCONTINUED | OUTPATIENT
Start: 2022-09-17 | End: 2022-09-19 | Stop reason: HOSPADM

## 2022-09-17 RX ADMIN — FUROSEMIDE 40 MG: 10 INJECTION, SOLUTION INTRAMUSCULAR; INTRAVENOUS at 20:48

## 2022-09-17 RX ADMIN — NITROGLYCERIN 5 MCG/MIN: 20 INJECTION INTRAVENOUS at 21:01

## 2022-09-17 RX ADMIN — NITROGLYCERIN 0.4 MG: 0.4 TABLET, ORALLY DISINTEGRATING SUBLINGUAL at 20:49

## 2022-09-17 RX ADMIN — IOPAMIDOL 75 ML: 755 INJECTION, SOLUTION INTRAVENOUS at 22:14

## 2022-09-17 ASSESSMENT — PAIN SCALES - GENERAL: PAINLEVEL_OUTOF10: 0

## 2022-09-17 ASSESSMENT — ENCOUNTER SYMPTOMS
ABDOMINAL DISTENTION: 0
CONSTIPATION: 0
COLOR CHANGE: 0
SHORTNESS OF BREATH: 1
COUGH: 0
NAUSEA: 0
SINUS PRESSURE: 0
EYES NEGATIVE: 1
WHEEZING: 0
BLOOD IN STOOL: 0
ABDOMINAL PAIN: 0
SINUS PAIN: 0
RHINORRHEA: 0
VOMITING: 0
DIARRHEA: 0
SORE THROAT: 0

## 2022-09-17 ASSESSMENT — PAIN - FUNCTIONAL ASSESSMENT: PAIN_FUNCTIONAL_ASSESSMENT: NONE - DENIES PAIN

## 2022-09-18 PROBLEM — J96.00 ACUTE RESPIRATORY FAILURE (HCC): Status: ACTIVE | Noted: 2022-09-18

## 2022-09-18 PROBLEM — N17.9 AKI (ACUTE KIDNEY INJURY) (HCC): Status: ACTIVE | Noted: 2022-09-18

## 2022-09-18 PROBLEM — I50.21 HEART FAILURE, SYSTOLIC, ACUTE (HCC): Status: ACTIVE | Noted: 2022-09-18

## 2022-09-18 LAB
ANION GAP SERPL CALCULATED.3IONS-SCNC: 13 MMOL/L (ref 3–16)
ANTI-XA UNFRAC HEPARIN: 0.18 IU/ML (ref 0.3–0.7)
APTT: 56 SEC (ref 23–34.3)
BASE EXCESS ARTERIAL: 2.2 MMOL/L (ref -3–3)
BUN BLDV-MCNC: 27 MG/DL (ref 7–20)
CALCIUM SERPL-MCNC: 9 MG/DL (ref 8.3–10.6)
CARBOXYHEMOGLOBIN ARTERIAL: 1.2 % (ref 0–1.5)
CHLORIDE BLD-SCNC: 102 MMOL/L (ref 99–110)
CO2: 28 MMOL/L (ref 21–32)
CREAT SERPL-MCNC: 1.5 MG/DL (ref 0.8–1.3)
EKG ATRIAL RATE: 312 BPM
EKG DIAGNOSIS: NORMAL
EKG Q-T INTERVAL: 324 MS
EKG QRS DURATION: 90 MS
EKG QTC CALCULATION (BAZETT): 451 MS
EKG R AXIS: 5 DEGREES
EKG T AXIS: 74 DEGREES
EKG VENTRICULAR RATE: 117 BPM
GFR AFRICAN AMERICAN: 55
GFR NON-AFRICAN AMERICAN: 45
GLUCOSE BLD-MCNC: 111 MG/DL (ref 70–99)
GLUCOSE BLD-MCNC: 120 MG/DL (ref 70–99)
GLUCOSE BLD-MCNC: 136 MG/DL (ref 70–99)
GLUCOSE BLD-MCNC: 155 MG/DL (ref 70–99)
GLUCOSE BLD-MCNC: 177 MG/DL (ref 70–99)
HCO3 ARTERIAL: 27 MMOL/L (ref 21–29)
HCT VFR BLD CALC: 44.5 % (ref 40.5–52.5)
HEMOGLOBIN, ART, EXTENDED: 15.1 G/DL
HEMOGLOBIN: 14.8 G/DL (ref 13.5–17.5)
INR BLD: 1.2 (ref 0.87–1.14)
LACTIC ACID: 1.3 MMOL/L (ref 0.4–2)
MAGNESIUM: 1.8 MG/DL (ref 1.8–2.4)
MCH RBC QN AUTO: 30.9 PG (ref 26–34)
MCHC RBC AUTO-ENTMCNC: 33.3 G/DL (ref 31–36)
MCV RBC AUTO: 92.9 FL (ref 80–100)
METHEMOGLOBIN ARTERIAL: 0.4 % (ref 0–1.4)
O2 SAT, ARTERIAL: 97 % (ref 93–100)
PCO2 ARTERIAL: 43.5 MMHG (ref 35–45)
PDW BLD-RTO: 13.9 % (ref 12.4–15.4)
PERFORMED ON: ABNORMAL
PH ARTERIAL: 7.41 (ref 7.35–7.45)
PLATELET # BLD: 158 K/UL (ref 135–450)
PMV BLD AUTO: 9.7 FL (ref 5–10.5)
PO2 ARTERIAL: 87.5 MMHG (ref 75–108)
POTASSIUM SERPL-SCNC: 4.5 MMOL/L (ref 3.5–5.1)
PROCALCITONIN: 0.03 NG/ML (ref 0–0.15)
PROTHROMBIN TIME: 15.2 SEC (ref 11.7–14.5)
RBC # BLD: 4.79 M/UL (ref 4.2–5.9)
SODIUM BLD-SCNC: 143 MMOL/L (ref 136–145)
TCO2 ARTERIAL: 29 MMOL/L
WBC # BLD: 8.8 K/UL (ref 4–11)

## 2022-09-18 PROCEDURE — 83605 ASSAY OF LACTIC ACID: CPT

## 2022-09-18 PROCEDURE — 85610 PROTHROMBIN TIME: CPT

## 2022-09-18 PROCEDURE — 36600 WITHDRAWAL OF ARTERIAL BLOOD: CPT

## 2022-09-18 PROCEDURE — 2580000003 HC RX 258

## 2022-09-18 PROCEDURE — 94761 N-INVAS EAR/PLS OXIMETRY MLT: CPT

## 2022-09-18 PROCEDURE — 36415 COLL VENOUS BLD VENIPUNCTURE: CPT

## 2022-09-18 PROCEDURE — 99223 1ST HOSP IP/OBS HIGH 75: CPT | Performed by: INTERNAL MEDICINE

## 2022-09-18 PROCEDURE — 6370000000 HC RX 637 (ALT 250 FOR IP)

## 2022-09-18 PROCEDURE — 93005 ELECTROCARDIOGRAM TRACING: CPT | Performed by: STUDENT IN AN ORGANIZED HEALTH CARE EDUCATION/TRAINING PROGRAM

## 2022-09-18 PROCEDURE — 6360000002 HC RX W HCPCS: Performed by: STUDENT IN AN ORGANIZED HEALTH CARE EDUCATION/TRAINING PROGRAM

## 2022-09-18 PROCEDURE — 85520 HEPARIN ASSAY: CPT

## 2022-09-18 PROCEDURE — 85730 THROMBOPLASTIN TIME PARTIAL: CPT

## 2022-09-18 PROCEDURE — 83735 ASSAY OF MAGNESIUM: CPT

## 2022-09-18 PROCEDURE — 85027 COMPLETE CBC AUTOMATED: CPT

## 2022-09-18 PROCEDURE — 6360000002 HC RX W HCPCS

## 2022-09-18 PROCEDURE — 94660 CPAP INITIATION&MGMT: CPT

## 2022-09-18 PROCEDURE — 2060000000 HC ICU INTERMEDIATE R&B

## 2022-09-18 PROCEDURE — 2700000000 HC OXYGEN THERAPY PER DAY

## 2022-09-18 PROCEDURE — 80048 BASIC METABOLIC PNL TOTAL CA: CPT

## 2022-09-18 PROCEDURE — 82803 BLOOD GASES ANY COMBINATION: CPT

## 2022-09-18 RX ORDER — INSULIN LISPRO 100 [IU]/ML
0-4 INJECTION, SOLUTION INTRAVENOUS; SUBCUTANEOUS NIGHTLY
Status: DISCONTINUED | OUTPATIENT
Start: 2022-09-18 | End: 2022-09-18

## 2022-09-18 RX ORDER — INSULIN LISPRO 100 [IU]/ML
0-8 INJECTION, SOLUTION INTRAVENOUS; SUBCUTANEOUS
Status: DISCONTINUED | OUTPATIENT
Start: 2022-09-18 | End: 2022-09-18

## 2022-09-18 RX ORDER — ACETAMINOPHEN 325 MG/1
650 TABLET ORAL EVERY 6 HOURS PRN
Status: DISCONTINUED | OUTPATIENT
Start: 2022-09-18 | End: 2022-09-19 | Stop reason: HOSPADM

## 2022-09-18 RX ORDER — TIZANIDINE 4 MG/1
2 TABLET ORAL NIGHTLY PRN
Status: DISCONTINUED | OUTPATIENT
Start: 2022-09-18 | End: 2022-09-19 | Stop reason: HOSPADM

## 2022-09-18 RX ORDER — SODIUM CHLORIDE 0.9 % (FLUSH) 0.9 %
5-40 SYRINGE (ML) INJECTION PRN
Status: DISCONTINUED | OUTPATIENT
Start: 2022-09-18 | End: 2022-09-19 | Stop reason: HOSPADM

## 2022-09-18 RX ORDER — INSULIN LISPRO 100 [IU]/ML
0-4 INJECTION, SOLUTION INTRAVENOUS; SUBCUTANEOUS NIGHTLY
Status: DISCONTINUED | OUTPATIENT
Start: 2022-09-18 | End: 2022-09-19 | Stop reason: HOSPADM

## 2022-09-18 RX ORDER — ASPIRIN 81 MG/1
81 TABLET ORAL DAILY
Status: DISCONTINUED | OUTPATIENT
Start: 2022-09-18 | End: 2022-09-19 | Stop reason: HOSPADM

## 2022-09-18 RX ORDER — AMIODARONE HYDROCHLORIDE 200 MG/1
200 TABLET ORAL DAILY
Status: DISCONTINUED | OUTPATIENT
Start: 2022-09-18 | End: 2022-09-19 | Stop reason: HOSPADM

## 2022-09-18 RX ORDER — HEPARIN SODIUM 1000 [USP'U]/ML
4000 INJECTION, SOLUTION INTRAVENOUS; SUBCUTANEOUS PRN
Status: DISCONTINUED | OUTPATIENT
Start: 2022-09-18 | End: 2022-09-19

## 2022-09-18 RX ORDER — ONDANSETRON 2 MG/ML
4 INJECTION INTRAMUSCULAR; INTRAVENOUS EVERY 6 HOURS PRN
Status: DISCONTINUED | OUTPATIENT
Start: 2022-09-18 | End: 2022-09-19 | Stop reason: HOSPADM

## 2022-09-18 RX ORDER — SPIRONOLACTONE 25 MG/1
25 TABLET ORAL ONCE
Status: COMPLETED | OUTPATIENT
Start: 2022-09-18 | End: 2022-09-18

## 2022-09-18 RX ORDER — HEPARIN SODIUM 1000 [USP'U]/ML
2000 INJECTION, SOLUTION INTRAVENOUS; SUBCUTANEOUS PRN
Status: DISCONTINUED | OUTPATIENT
Start: 2022-09-18 | End: 2022-09-19

## 2022-09-18 RX ORDER — OMEPRAZOLE 20 MG/1
20 CAPSULE, DELAYED RELEASE ORAL
Status: DISCONTINUED | OUTPATIENT
Start: 2022-09-18 | End: 2022-09-19 | Stop reason: HOSPADM

## 2022-09-18 RX ORDER — HEPARIN SODIUM 5000 [USP'U]/ML
5000 INJECTION, SOLUTION INTRAVENOUS; SUBCUTANEOUS EVERY 8 HOURS SCHEDULED
Status: DISCONTINUED | OUTPATIENT
Start: 2022-09-18 | End: 2022-09-18

## 2022-09-18 RX ORDER — POLYETHYLENE GLYCOL 3350 17 G/17G
17 POWDER, FOR SOLUTION ORAL DAILY PRN
Status: DISCONTINUED | OUTPATIENT
Start: 2022-09-18 | End: 2022-09-19 | Stop reason: HOSPADM

## 2022-09-18 RX ORDER — SODIUM CHLORIDE 9 MG/ML
INJECTION, SOLUTION INTRAVENOUS PRN
Status: DISCONTINUED | OUTPATIENT
Start: 2022-09-18 | End: 2022-09-19 | Stop reason: HOSPADM

## 2022-09-18 RX ORDER — DEXTROSE MONOHYDRATE 100 MG/ML
INJECTION, SOLUTION INTRAVENOUS CONTINUOUS PRN
Status: DISCONTINUED | OUTPATIENT
Start: 2022-09-18 | End: 2022-09-19 | Stop reason: HOSPADM

## 2022-09-18 RX ORDER — ONDANSETRON 4 MG/1
4 TABLET, ORALLY DISINTEGRATING ORAL EVERY 8 HOURS PRN
Status: DISCONTINUED | OUTPATIENT
Start: 2022-09-18 | End: 2022-09-19 | Stop reason: HOSPADM

## 2022-09-18 RX ORDER — ATORVASTATIN CALCIUM 80 MG/1
80 TABLET, FILM COATED ORAL DAILY
Status: DISCONTINUED | OUTPATIENT
Start: 2022-09-18 | End: 2022-09-19 | Stop reason: HOSPADM

## 2022-09-18 RX ORDER — FUROSEMIDE 10 MG/ML
40 INJECTION INTRAMUSCULAR; INTRAVENOUS DAILY
Status: DISCONTINUED | OUTPATIENT
Start: 2022-09-18 | End: 2022-09-19

## 2022-09-18 RX ORDER — SODIUM CHLORIDE 0.9 % (FLUSH) 0.9 %
5-40 SYRINGE (ML) INJECTION EVERY 12 HOURS SCHEDULED
Status: DISCONTINUED | OUTPATIENT
Start: 2022-09-18 | End: 2022-09-19 | Stop reason: HOSPADM

## 2022-09-18 RX ORDER — ACETAMINOPHEN 650 MG/1
650 SUPPOSITORY RECTAL EVERY 6 HOURS PRN
Status: DISCONTINUED | OUTPATIENT
Start: 2022-09-18 | End: 2022-09-19 | Stop reason: HOSPADM

## 2022-09-18 RX ORDER — INSULIN LISPRO 100 [IU]/ML
0-4 INJECTION, SOLUTION INTRAVENOUS; SUBCUTANEOUS
Status: DISCONTINUED | OUTPATIENT
Start: 2022-09-18 | End: 2022-09-19 | Stop reason: HOSPADM

## 2022-09-18 RX ORDER — HEPARIN SODIUM 10000 [USP'U]/100ML
5-30 INJECTION, SOLUTION INTRAVENOUS CONTINUOUS
Status: DISCONTINUED | OUTPATIENT
Start: 2022-09-18 | End: 2022-09-19

## 2022-09-18 RX ORDER — HEPARIN SODIUM 1000 [USP'U]/ML
4000 INJECTION, SOLUTION INTRAVENOUS; SUBCUTANEOUS ONCE
Status: COMPLETED | OUTPATIENT
Start: 2022-09-18 | End: 2022-09-18

## 2022-09-18 RX ORDER — CARVEDILOL 25 MG/1
25 TABLET ORAL 2 TIMES DAILY WITH MEALS
Status: DISCONTINUED | OUTPATIENT
Start: 2022-09-18 | End: 2022-09-19 | Stop reason: HOSPADM

## 2022-09-18 RX ADMIN — HEPARIN SODIUM 5000 UNITS: 5000 INJECTION INTRAVENOUS; SUBCUTANEOUS at 06:39

## 2022-09-18 RX ADMIN — FUROSEMIDE 40 MG: 10 INJECTION, SOLUTION INTRAMUSCULAR; INTRAVENOUS at 09:12

## 2022-09-18 RX ADMIN — SODIUM CHLORIDE, PRESERVATIVE FREE 10 ML: 5 INJECTION INTRAVENOUS at 09:42

## 2022-09-18 RX ADMIN — CARVEDILOL 25 MG: 25 TABLET, FILM COATED ORAL at 17:48

## 2022-09-18 RX ADMIN — ATORVASTATIN CALCIUM 80 MG: 80 TABLET, FILM COATED ORAL at 09:12

## 2022-09-18 RX ADMIN — ASPIRIN 81 MG: 81 TABLET, COATED ORAL at 09:12

## 2022-09-18 RX ADMIN — OMEPRAZOLE 20 MG: 20 CAPSULE, DELAYED RELEASE ORAL at 09:11

## 2022-09-18 RX ADMIN — AMIODARONE HYDROCHLORIDE 200 MG: 200 TABLET ORAL at 09:12

## 2022-09-18 RX ADMIN — HEPARIN SODIUM AND DEXTROSE 10 UNITS/KG/HR: 10000; 5 INJECTION INTRAVENOUS at 11:25

## 2022-09-18 RX ADMIN — SPIRONOLACTONE 25 MG: 25 TABLET, FILM COATED ORAL at 03:34

## 2022-09-18 RX ADMIN — CARVEDILOL 25 MG: 25 TABLET, FILM COATED ORAL at 09:12

## 2022-09-18 RX ADMIN — HEPARIN SODIUM 2000 UNITS: 1000 INJECTION INTRAVENOUS; SUBCUTANEOUS at 20:37

## 2022-09-18 RX ADMIN — SODIUM CHLORIDE, PRESERVATIVE FREE 10 ML: 5 INJECTION INTRAVENOUS at 20:35

## 2022-09-18 RX ADMIN — HEPARIN SODIUM 4000 UNITS: 1000 INJECTION INTRAVENOUS; SUBCUTANEOUS at 11:25

## 2022-09-18 ASSESSMENT — PAIN SCALES - GENERAL
PAINLEVEL_OUTOF10: 0

## 2022-09-18 ASSESSMENT — ENCOUNTER SYMPTOMS
SHORTNESS OF BREATH: 1
SHORTNESS OF BREATH: 0
VOMITING: 0
ABDOMINAL PAIN: 0
CONSTIPATION: 0
DIARRHEA: 0
NAUSEA: 0
COUGH: 0

## 2022-09-18 NOTE — PROGRESS NOTES
Progress Note    Admit Date: 9/17/2022  Day: 2  Diet: ADULT DIET; Regular; 3 carb choices (45 gm/meal); Low Sodium (2 gm)    CC: SOB    Interval history: NAEON. Pt denies any symptoms or concerns during interview and assessment. He stated that he was working as a  and started to have palpitations and SOB. He denied having any hx of GI bleed. Heparin gtt was started for [facility]. HPI: [address] is a 79 y/o male with a PMHx of CHF, aFib not on a/c and DMII who presents to the ED complaining of acute onset SOB. 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 Patient had one episode of this a long time ago but does not remember what may have triggered. Patient also report a swollen feeling in his abdomen. Because the patient never had the opportunity to lie down, unable to assess orthopnea. Patient denies cough, chest pain, hemoptysis, fever/chills, nausea/vomiting, abdominal pain and dysuria. Patient reportedly took all of his blood pressure medication earlier that day and had no alteration in his diet. He does have a recent history of travelling to [facility]. Echo: Last recorded Echo was in 2016 and showed en EF of 40-45%     When EMS arrived, patient's O2 was in the low 80s. He was placed in Cpap and brought to the ED, where he was transitioned to BiPaP.      Medications:     Scheduled Meds:   amiodarone  200 mg Oral Daily    aspirin EC  81 mg Oral Daily    atorvastatin  80 mg Oral Daily    carvedilol  25 mg Oral BID WC    omeprazole  20 mg Oral QAM AC    sodium chloride flush  5-40 mL IntraVENous 2 times per day    furosemide  40 mg IntraVENous Daily    insulin lispro  0-4 Units SubCUTAneous TID WC    insulin lispro  0-4 Units SubCUTAneous Nightly     Continuous Infusions:   sodium chloride      dextrose      heparin (PORCINE) Infusion 10 Units/kg/hr (09/18/22 1120)     PRN Meds:diclofenac sodium, tiZANidine, sodium chloride flush, sodium chloride, ondansetron **OR** ondansetron, polyethylene glycol, acetaminophen **OR** acetaminophen, glucose, dextrose bolus **OR** dextrose bolus, glucagon (rDNA), dextrose, heparin (porcine), heparin (porcine), nitroGLYCERIN    Objective:   Vitals:   T-max:  Patient Vitals for the past 8 hrs:   BP Temp Temp src Pulse Resp SpO2 Weight   09/18/22 1407 -- -- -- 74 -- -- --   09/18/22 1201 (!) 92/55 97.5 °F (36.4 °C) Oral 78 18 96 % --   09/18/22 1015 -- -- -- 90 -- -- --   09/18/22 0753 128/67 97.5 °F (36.4 °C) Oral 82 18 97 % 216 lb 14.9 oz (98.4 kg)       Intake/Output Summary (Last 24 hours) at 9/18/2022 1519  Last data filed at 9/18/2022 1407  Gross per 24 hour   Intake 960 ml   Output 850 ml   Net 110 ml       Review of Systems   Constitutional:  Negative for chills and fever. Eyes:  Negative for visual disturbance. Respiratory:  Negative for cough and shortness of breath. Cardiovascular:  Negative for chest pain, palpitations and leg swelling. Gastrointestinal:  Negative for abdominal pain, constipation, diarrhea, nausea and vomiting. Genitourinary:  Negative for dysuria. Musculoskeletal:  Negative for arthralgias and myalgias. Neurological:  Negative for dizziness and headaches. Physical Exam  Constitutional:       General: He is not in acute distress. Appearance: Normal appearance. He is not ill-appearing or diaphoretic. HENT:      Head: Normocephalic and atraumatic. Eyes:      Extraocular Movements: Extraocular movements intact. Cardiovascular:      Rate and Rhythm: Normal rate. Rhythm irregular. Heart sounds: No murmur heard. No friction rub. No gallop. Pulmonary:      Breath sounds: Rales present. No wheezing or rhonchi. Abdominal:      General: There is no distension. Tenderness: There is no abdominal tenderness. There is no guarding or rebound. Musculoskeletal:      Right lower leg: No edema.       Left lower leg: No fluids daily  -CHF RN consulted  -Cardiology following     Afib   Hx of afib, not on AC at home. -  Cont home Amiodarone 200 mg PO qd  - Cont home Coreg 25 mg BID  -  Telemetry  -  Keep K>4 and Mg>2  - Cardiology following     ABDIFATAH   On admission Cr 1.6 with baseline around 1.3. Likely pre renal etiology 2/2 to CHF and afib. -Lasix  -BMP daily  -Strict I/O and daily weights     Chronic Problems  DMII - LDSS    Code Status: Full code  FEN: ADULT DIET; Regular; 3 carb choices (45 gm/meal);  Low Sodium (2 gm)   PPX: Heparin gtt  DISPO: Camilo Yoo DO, PGY-2  09/18/22  3:19 PM    This patient has been staffed and discussed with Lili Cummings MD.

## 2022-09-18 NOTE — PLAN OF CARE
Problem: Safety - Adult  Goal: Free from fall injury  9/18/2022 1755 by Vik Andrade RN  Outcome: Progressing  9/18/2022 1255 by Vik Andrade RN  Flowsheets (Taken 9/18/2022 1246)  Free From Fall Injury:   Emily Ellis family/caregiver on patient safety   Based on caregiver fall risk screen, instruct family/caregiver to ask for assistance with transferring infant if caregiver noted to have fall risk factors  9/18/2022 0618 by Trish Dey RN  Outcome: Progressing  Flowsheets (Taken 9/18/2022 0618)  Free From Fall Injury: Instruct family/caregiver on patient safety  Note: Fall precautions are in place. Bed alarm is on and in lowest position. Pt is using call light appropriately. Will continue to monitor.        Problem: ABCDS Injury Assessment  Goal: Absence of physical injury  Outcome: Progressing  Flowsheets (Taken 9/18/2022 1246)  Absence of Physical Injury: Implement safety measures based on patient assessment     P

## 2022-09-18 NOTE — ED PROVIDER NOTES
ED Attending Attestation Note     Date of evaluation: 9/17/2022    This patient was seen by the resident. I have seen and examined the patient, agree with the workup, evaluation, management and diagnosis. The care plan has been discussed. I have reviewed the ECG and concur with the resident's interpretation. My assessment reveals gentlemanman with a history of heart failure presenting with sudden onset shortness of breath earlier tonight. He also has a recent long distance flight. On exam, the patient is awake alert conversant. He has increased work of breathing with tachypnea and some mild use of accessory muscles. There are no significant wheezes on auscultation of the lungs, but there are diffuse crackles. There are no focal rhonchi. His abdomen is soft and benign, free tenderness to palpation. His legs are free of asymmetry, erythema, warmth, or palpable focal swelling. There is some mild edema bilaterally. Clinically the impression on arrival was of flash pulmonary edema. Patient was transitioned to our BiPAP which provided much better seal and the patient reported improvement there. He was also simultaneously given 1 sublingual nitroglycerin tablet and then transition to a low-dose nitroglycerin drip. He has done very well and report significant symptomatic improvement. He was also given 40 mg of IV Lasix. I do believe that the patient will require inpatient stabilization and monitoring. We will plan for admission to the PCU.      [place] Ambrose MD  09/17/22 2955

## 2022-09-18 NOTE — H&P
Internal Medicine  PGY 1  History & Physical      CC: SOB    History Obtained From:  patient    HISTORY OF PRESENT ILLNESS:    ST is a 81 y/o male with a PMHx of CHF, aFib not on a/c and DMII who presents to the ED complaining of acute onset SOB. 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 Patient had one episode of this a long time ago but does not remember what may have triggered. Patient also report a swollen feeling in his abdomen. Because the patient never had the opportunity to lie down, unable to assess orthopnea. Patient denies cough, chest pain, hemoptysis, fever/chills, nausea/vomiting, abdominal pain and dysuria. Patient reportedly took all of his blood pressure medication earlier that day and had no alteration in his diet. He does have a recent history of travelling to [facility]. Echo: Last recorded Echo was in 2016 and showed en EF of 40-45%    When EMS arrived, patient's O2 was in the low 80s. He was placed in Cpap and brought to the ED, where he was transitioned to BiPaP. ED course:  Vitals:   BP   153/92   Temp      Pulse 99 (09/18/22 0150)   Resp 20 (09/18/22 0150)    SpO2 97 % (09/18/22 0150)    Labs: Creatinine=1.6 on 1.2/1.3 baseline; Lactic acid=2.2;  BNP in 1,470; WBC=13.0  Imaging: Cephalization and Mediastinal widening on CXR suggestive of pulmonary edema;  Patchy airspace; CTPA negative for PE  Interventions:  BiPaP; Lasix 40 mg IV; Nitro gtt    Past Medical History:        Diagnosis Date    Atrial fibrillation (HCC)     CHF (congestive heart failure) ([facility] [address]     Diverticulosis     Hyperlipidemia     Hypertension     Obesity     Unspecified sleep apnea        Past Surgical History:        Procedure Laterality Date    CHOLECYSTECTOMY, LAPAROSCOPIC N/A 6/11/2021    LAPAROSCOPIC CHOLECYSTECTOMY WITH CHOLANGIOGRAM performed by Martha Santos MD at [place] [address] COLONOSCOPY  3/2007.;;4/14    polypectomy-DR. Garza. COLONOSCOPY  4/14    DR. Ashraf-polypectomy x 4    COLONOSCOPY  05/02/2017    Dr Ashraf-polypectomy x -tubular adenomas    COLONOSCOPY N/A 2/11/2020    COLONOSCOPY POLYPECTOMY SNARE/COLD BIOPSY performed by Roberth Anderson MD at [facility]       Medications Priorto Admission:    Not in a hospital admission. Allergies:  Patient has no known allergies. Social History:   TOBACCO:   reports that he quit smoking about 31 years ago. His smoking use included cigarettes. He has a 1.25 pack-year smoking history. He has never used smokeless tobacco.  ETOH:   reports current alcohol use of about 2.0 standard drinks per week. DRUGS : None      Family History:       Problem Relation Age of Onset    Cancer Father         prostate    Diabetes Sister        Review of Systems   Constitutional:  Negative for chills, fatigue and fever. Respiratory:  Positive for shortness of breath. Negative for cough. Cardiovascular:  Negative for chest pain, palpitations and leg swelling. Gastrointestinal:  Negative for abdominal pain, nausea and vomiting. Neurological:  Negative for headaches. ROS: A 10 point review of systems was conducted, significant findings as noted in HPI. Physical Exam  Cardiovascular:      Rate and Rhythm: Tachycardia present. Rhythm irregular. Pulmonary:      Effort: Respiratory distress present. Breath sounds: Rales present. Abdominal:      General: Abdomen is protuberant. Tenderness: There is no abdominal tenderness. Musculoskeletal:         General: No tenderness. Right lower leg: Edema (trace) present. Left lower leg: Edema (trace) present. Neurological:      General: No focal deficit present. Mental Status: He is alert and oriented to person, place, and time.      Physical exam:       Vitals:    09/18/22 0050   BP: 115/65   Pulse: 96   Resp: 21   Temp:    SpO2: 99%       DATA:    Labs:  CBC:   Recent Labs 09/17/22 2119   WBC 13.0*   HGB 16.5   HCT 49.0          BMP:   Recent Labs     09/17/22 2119      K 4.9      CO2 22   BUN 26*   CREATININE 1.6*   GLUCOSE 296*     LFT's:   Recent Labs     09/17/22 2119   AST 21   ALT 28   BILITOT 0.7   ALKPHOS 94     Troponin:   Recent Labs     09/17/22 2119   Gardenia Refugio <0.01     BNP:No results for input(s): BNP in the last 72 hours. ABGs: No results for input(s): PHART, IHF1OHB, PO2ART in the last 72 hours. INR:   Recent Labs     09/17/22 2119   INR 1.11       U/A:  Recent Labs     09/17/22 2155   COLORU Yellow   PHUR 6.0   CLARITYU Clear   SPECGRAV 1.020   LEUKOCYTESUR Negative   UROBILINOGEN 0.2   BILIRUBINUR Negative   BLOODU Negative   GLUCOSEU Negative       CTA CHEST W WO CONTRAST PE Eval   Final Result      1. No CT findings for pulmonary thromboembolism on the current exam.      2.  Diffuse and multifocal patchy groundglass airspace opacities right greater than left, consistent with edema versus pneumonia. 3.  Mild reflux of contrast into the intrahepatic IVC which can be seen with right heart dysfunction. Correlate clinically. XR CHEST PORTABLE   Final Result      1. Diffuse bilateral interstitial and alveolar airspace disease consistent with underlying infiltrate and/or edema. Correlate clinically. ASSESSMENT AND PLAN:    Flash Pulmonary Edema 2/2 CHF Exacerbation - Acute onset of pulmonary edema likely a direct consequence of acute CHF exacerbation.  It is not exactly clear what triggered this episode, but it is possible the stress from his work triggered an episode of Afib, which could have put the patient into CHF.         -  CHF Nurse/Coordinator        -  Nitro 50 mg in 5% dextrose gtt        -  Echo        -  Lasix 40 mg IV qd        -  Spironolactone 25 mg qd         -  Coreg 25 mg qd        -  Daily CBC, BMP and Mg        -  Lactic Acid q6h        -  Lipid panel        -  BNP every three days        -  Low Sodium Diet        -  Strict Is and Os        -  Daily Weights    2. Afib - Patient's rhythm has been irregular and rate has  been mostly controlled. Patient currently not on any A/C for Afib but has been started on heparin for DVT prophylaxis. -  Amiodarone 200 mg PO qd        -  Continuous Tele        -  K>4 and Mg>2    3. ABDIFATAH - Patient has a creatinine of 1.6 against a baseline of 1.2/1.3. Given the backup of fluids in his lungs, it is likely pre-renal. Patient is already on a nitro drip and has CHF exacerbation, so will not be given fluids.      Chronic Problems  DMII - LDSS    Will discuss with attending physician Dr. Becca Morejon Status:Full code  FEN: Regular Diet  PPX: Heparin  DISPO: Reddy Lujan MD  9/18/2022,  1:39 AM

## 2022-09-18 NOTE — PROGRESS NOTES
Pt gave me his cardiologist number Dr. Cassandra Zamarripa [phone]. Wanted consulting cards to call him. I left a message with Dr. Calin Pratt answering service.   Electronically signed by Jill Blanc RN on 9/18/2022 at 2:10 PM

## 2022-09-18 NOTE — PROGRESS NOTES
Patient brought in by squad on CPAP. Placed on Bipap per doctor order.     Trilogy Bipap  FIO2 100%  IPAP 12  EPAP 5  RR 18  FIO2 100%

## 2022-09-18 NOTE — PLAN OF CARE
Problem: Respiratory - Adult  Goal: Achieves optimal ventilation and oxygenation  Outcome: Progressing  Flowsheets (Taken 9/18/2022 0618)  Achieves optimal ventilation and oxygenation:   Assess for changes in respiratory status   Assess for changes in mentation and behavior  Note: Pt is now off BIPAP. SPO2>90% on 2L O2. Will continue to monitor. Problem: Safety - Adult  Goal: Free from fall injury  Outcome: Progressing  Flowsheets (Taken 9/18/2022 0618)  Free From Fall Injury: Instruct family/caregiver on patient safety  Note: Fall precautions are in place. Bed alarm is on and in lowest position. Pt is using call light appropriately. Will continue to monitor.

## 2022-09-18 NOTE — PROGRESS NOTES
Patient transported from ED to room 4308 along with ED nurse while on Trilogy bipap. Upon arrival placed on continuous pulse oximetry. Patient is calm and with RN at bedside.

## 2022-09-18 NOTE — PROGRESS NOTES
Pt admitted to floor and placed on telemetry. Unable to complete medication list due to pt unsure of all of the medications he takes. 4 Eyes Admission Assessment     I agree as the admission nurse that 2 RN's have performed a thorough Head to Toe Skin Assessment on the patient. ALL assessment sites listed below have been assessed on admission. Areas assessed by both nurses:   [x]   Head, Face, and Ears   [x]   Shoulders, Back, and Chest  [x]   Arms, Elbows, and Hands   [x]   Coccyx, Sacrum, and Ischum  [x]   Legs, Feet, and Heels        Does the Patient have Skin Breakdown? Abrasion to RLE. Redness to left groin.          Hansel Prevention initiated:  No   Wound Care Orders initiated:  No      WOC nurse consulted for Pressure Injury (Stage 3,4, Unstageable, DTI, NWPT, and Complex wounds):  No      Nurse 1 eSignature: Electronically signed by Shaka Weeks RN on 9/18/22 at 3:11 AM EDT    **SHARE this note so that the co-signing nurse is able to place an eSignature**    Nurse 2 eSignature: Electronically signed by Fransisco Antunez RN on 9/18/22 at 2:04 AM EDT

## 2022-09-18 NOTE — ED PROVIDER NOTES
for color change, pallor, rash and wound. Neurological:  Negative for dizziness, weakness, light-headedness, numbness and headaches. All other systems reviewed and are negative. Past Medical, Surgical, Family, and Social History     He has a past medical history of Atrial fibrillation ([facility] [address] CHF (congestive heart failure) ([facility] [address] Diverticulosis, Hyperlipidemia, Hypertension, Obesity, and Unspecified sleep apnea. He has a past surgical history that includes Colonoscopy (3/2007.;;4/14); Colonoscopy (4/14); Colonoscopy (05/02/2017); Colonoscopy (N/A, 2/11/2020); and Cholecystectomy, laparoscopic (N/A, 6/11/2021). His family history includes Cancer in his father; Diabetes in his sister. He reports that he quit smoking about 31 years ago. His smoking use included cigarettes. He has a 1.25 pack-year smoking history. He has never used smokeless tobacco. He reports current alcohol use of about 2.0 standard drinks per week. He reports that he does not use drugs. Medications     Previous Medications    AMIODARONE (CORDARONE) 200 MG TABLET    TAKE 1 TABLET EVERY DAY    ASPIRIN EC 81 MG EC TABLET    Take 1 tablet by mouth daily. ATORVASTATIN (LIPITOR) 80 MG TABLET    TAKE 1 TABLET EVERY DAY    CARVEDILOL (COREG) 25 MG TABLET    TAKE 1 TABLET TWICE DAILY WITH MEALS    DICLOFENAC SODIUM (VOLTAREN) 1 % GEL    Apply 4 g topically 4 times daily as needed for Pain    EMPAGLIFLOZIN (JARDIANCE) 10 MG TABLET    Take 1 tablet by mouth daily    FUROSEMIDE (LASIX) 20 MG TABLET    Take 10 mg by mouth daily    GABAPENTIN (NEURONTIN) 100 MG CAPSULE    Take one tablet once daily for 2 days. Then take one tablet twice daily for 2 days.  Then take one tablet three times daily for rest    MECLIZINE (ANTIVERT) 25 MG TABLET    TAKE 1 TABLET THREE TIMES DAILY AS NEEDED FOR  DIZZINESS    METFORMIN (GLUCOPHAGE) 500 MG TABLET    Take 1 tablet by mouth daily (with breakfast)    METHYLPREDNISOLONE (MEDROL DOSEPACK) 4 MG TABLET    Take by mouth as directed. OMEPRAZOLE (PRILOSEC) 20 MG DELAYED RELEASE CAPSULE    Take 20 mg by mouth daily    SPIRONOLACTONE (ALDACTONE) 25 MG TABLET    TAKE 1 TABLET EVERY DAY    TIZANIDINE (ZANAFLEX) 2 MG TABLET    Take 1 tablet by mouth nightly as needed (Back pain)       Allergies     He has No Known Allergies. Physical Exam     INITIAL VITALS: BP: (!) 153/92, Temp: (!) 96.2 °F (35.7 °C), Heart Rate: (!) 103, Resp: (!) 32, SpO2: (!) 86 %   Physical Exam  Vitals and nursing note reviewed. Constitutional:       Appearance: Normal appearance. He is not toxic-appearing. HENT:      Head: Normocephalic. Right Ear: External ear normal.      Left Ear: External ear normal.      Nose: Nose normal. No congestion or rhinorrhea. Mouth/Throat:      Mouth: Mucous membranes are moist.      Pharynx: No oropharyngeal exudate or posterior oropharyngeal erythema. Eyes:      Extraocular Movements: Extraocular movements intact. Conjunctiva/sclera: Conjunctivae normal.      Pupils: Pupils are equal, round, and reactive to light. Cardiovascular:      Rate and Rhythm: Tachycardia present. Rhythm irregular. Pulses: Normal pulses. Heart sounds: Normal heart sounds. No murmur heard. No friction rub. No gallop. Pulmonary:      Effort: Tachypnea, accessory muscle usage and respiratory distress present. Breath sounds: Examination of the right-middle field reveals rales. Examination of the left-middle field reveals rales. Examination of the right-lower field reveals rales. Examination of the left-lower field reveals rales. Rales present. No wheezing or rhonchi. Abdominal:      General: Abdomen is flat. Bowel sounds are normal. There is no distension. Palpations: Abdomen is soft. Tenderness: There is no abdominal tenderness. There is no guarding or rebound. Musculoskeletal:         General: No swelling, tenderness or deformity. Normal range of motion.       Right lower leg: Edema (1+ pitting) present. Left lower leg: Edema (1+ pitting) present. Skin:     General: Skin is warm and dry. Capillary Refill: Capillary refill takes less than 2 seconds. Coloration: Skin is not jaundiced or pale. Neurological:      General: No focal deficit present. Mental Status: He is alert and oriented to person, place, and time. DiagnosticResults     EKG   Interpreted in conjunction with emergencydepartment physician Donna Lemus MD  Rhythm: atrial fibrillation - rapid  Rate: tachycardia  Axis: normal  Ectopy: none  Conduction: normal  ST Segments: no acute change  T Waves:no acute change  Q Waves: none  Clinical Impression: atrial fibrillation (chronic)  Comparison:  RVR new from previous EKG 6/9/2021    RADIOLOGY:  CTA CHEST W WO CONTRAST PE Eval   Final Result      1. No CT findings for pulmonary thromboembolism on the current exam.      2.  Diffuse and multifocal patchy groundglass airspace opacities right greater than left, consistent with edema versus pneumonia. 3.  Mild reflux of contrast into the intrahepatic IVC which can be seen with right heart dysfunction. Correlate clinically. XR CHEST PORTABLE   Final Result      1. Diffuse bilateral interstitial and alveolar airspace disease consistent with underlying infiltrate and/or edema. Correlate clinically.           LABS:   Results for orders placed or performed during the hospital encounter of 09/17/22   CBC with Auto Differential   Result Value Ref Range    WBC 13.0 (H) 4.0 - 11.0 K/uL    RBC 5.23 4.20 - 5.90 M/uL    Hemoglobin 16.5 13.5 - 17.5 g/dL    Hematocrit 49.0 40.5 - 52.5 %    MCV 93.8 80.0 - 100.0 fL    MCH 31.6 26.0 - 34.0 pg    MCHC 33.7 31.0 - 36.0 g/dL    RDW 14.0 12.4 - 15.4 %    Platelets 658 929 - 821 K/uL    MPV 10.0 5.0 - 10.5 fL    Neutrophils % 55.6 %    Lymphocytes % 31.5 %    Monocytes % 9.6 %    Eosinophils % 2.7 %    Basophils % 0.6 %    Neutrophils Absolute 7.2 1.7 - 7.7 K/uL    Lymphocytes Absolute 4.1 1.0 - 5.1 K/uL    Monocytes Absolute 1.2 0.0 - 1.3 K/uL    Eosinophils Absolute 0.4 0.0 - 0.6 K/uL    Basophils Absolute 0.1 0.0 - 0.2 K/uL   Comprehensive Metabolic Panel w/ Reflex to MG   Result Value Ref Range    Sodium 138 136 - 145 mmol/L    Potassium reflex Magnesium 4.9 3.5 - 5.1 mmol/L    Chloride 101 99 - 110 mmol/L    CO2 22 21 - 32 mmol/L    Anion Gap 15 3 - 16    Glucose 296 (H) 70 - 99 mg/dL    BUN 26 (H) 7 - 20 mg/dL    Creatinine 1.6 (H) 0.8 - 1.3 mg/dL    GFR Non-African American 42 (A) >60    GFR  51 (A) >60    Calcium 9.2 8.3 - 10.6 mg/dL    Total Protein 7.7 6.4 - 8.2 g/dL    Albumin 4.7 3.4 - 5.0 g/dL    Albumin/Globulin Ratio 1.6 1.1 - 2.2    Total Bilirubin 0.7 0.0 - 1.0 mg/dL    Alkaline Phosphatase 94 40 - 129 U/L    ALT 28 10 - 40 U/L    AST 21 15 - 37 U/L   Troponin   Result Value Ref Range    Troponin <0.01 <0.01 ng/mL   Brain Natriuretic Peptide   Result Value Ref Range    Pro-BNP 1,470 (H) 0 - 449 pg/mL   Protime-INR   Result Value Ref Range    Protime 14.3 11.7 - 14.5 sec    INR 1.11 0.87 - 1.14   Urinalysis   Result Value Ref Range    Color, UA Yellow Straw/Yellow    Clarity, UA Clear Clear    Glucose, Ur Negative Negative mg/dL    Bilirubin Urine Negative Negative    Ketones, Urine Negative Negative mg/dL    Specific Gravity, UA 1.020 1.005 - 1.030    Blood, Urine Negative Negative    pH, UA 6.0 5.0 - 8.0    Protein, UA Negative Negative mg/dL    Urobilinogen, Urine 0.2 <2.0 E.U./dL    Nitrite, Urine Negative Negative    Leukocyte Esterase, Urine Negative Negative    Microscopic Examination Not Indicated     Urine Type NotGiven    Lactic Acid   Result Value Ref Range    Lactic Acid 2.2 (H) 0.4 - 2.0 mmol/L   Blood Gas, Venous   Result Value Ref Range    pH, Pavel 7.219 (L) 7.350 - 7.450    pCO2, Pavel 61.4 (H) 41.0 - 51.0 mmHg    pO2, Pavel 70.9 (H) 25.0 - 40.0 mmHg    HCO3, Venous 25.0 24.0 - 28.0 mmol/L    Base Excess, Pavel -4.3 (L) -2.0 - 3.0 mmol/L    O2 Sat, Pavel 90 Not established %    Carboxyhemoglobin 0.6 0.0 - 1.5 %    MetHgb, Pavel 0.0 0.0 - 1.5 %    TC02 (Calc), Pavel 27 mmol/L    Hemoglobin, Pavel, Reduced 10.10 %       ED BEDSIDE ULTRASOUND:  No results found. RECENT VITALS:  BP: 134/72, Temp: 97.5 °F (36.4 °C), Heart Rate: 94,Resp: 21, SpO2: 99 %       ED Course     Nursing Notes, Past Medical Hx, Past Surgical Hx, Social Hx, Allergies, and Family Hx were reviewed. The patient was given the followingmedications:  Orders Placed This Encounter   Medications    furosemide (LASIX) injection 40 mg    nitroGLYCERIN (NITROSTAT) SL tablet 0.4 mg    nitroGLYCERIN 50 mg in dextrose 5% 250 mL infusion     Order Specific Question:   Titrate Infusion? Answer:   No     Order Specific Question:   Infusion Dose: Answer:   5 mcg/min    iopamidol (ISOVUE-370) 76 % injection 75 mL       CONSULTS:  Lacey West / MADHAVI / Gilbert Scott is a 80 y.o. male with a past medical history of hypertension, CHF, atrial fibrillation on amiodarone not anticoagulated who presents with acute SOB with hypoxia. Initial presentation concerning for flash pulmonary edema. Patient on BiPAP on arrival with oxygen saturation in the high 80s. Oxygen saturation improved to 100% after improved CO and receiving nitroglycerin. Physical exam and chest x-ray also consistent with pulmonary edema so patient given additional 40 mg IV Lasix and started on low-dose nitroglycerin drip. Given recent travel to [facility], also concern for pulmonary embolism thus CTPA ordered and this showed no evidence of pulmonary embolism. Laboratory work-up showed ABG with respiratory acidosis, CBC with mild leukocytosis without left shift, CMP with ABDIFATAH with creatinine 1.6 but normal electrolytes, BNP elevated at 1400, initial lactate 2.2, troponin <0.01. Urinalysis was unremarkable.   Patient's symptoms improved significantly with the aforementioned interventions. At this time, plan for admission to PCU for further management and evaluation of flash pulmonary edema. This patient was also evaluated by the attending physician. All care plans werediscussed and agreed upon. Clinical Impression     1. Acute pulmonary edema (HCC)    2. Congestive heart failure, unspecified HF chronicity, unspecified heart failure type ([facility] [address]    3. Hypoxia        Disposition     PATIENT REFERRED TO:  No follow-up provider specified.     DISCHARGE MEDICATIONS:  New Prescriptions    No medications on file       DISPOSITION Decision To Admit 09/17/2022 11:03:27 PM       Juvenal Guzman MD  Resident  09/18/22 0809

## 2022-09-18 NOTE — CONSULTS
Reason for Consultation/Chief Complaint: SOB    History of Present Illness:  Raphael Bourne is a 80 y.o. patient whom we were asked to see for sob/CHF. Hx NICM and p afib. Presents with sudden onset of sob. Was working waiting tables and noted sob. Denied chest pain. No palp/tachy. Felt full in abd. No recent edema. No pnd or orthopnea. ER noted low O2 sats. Felt to be CHF. Noted also afib with RVR. Did not feel RVR. Past Medical History:   has a past medical history of Atrial fibrillation ([facility] [address] CHF (congestive heart failure) ([facility] [address] Diverticulosis, Hyperlipidemia, Hypertension, Obesity, and Unspecified sleep apnea. Surgical History:   has a past surgical history that includes Colonoscopy (3/2007.;;4/14); Colonoscopy (4/14); Colonoscopy (05/02/2017); Colonoscopy (N/A, 2/11/2020); and Cholecystectomy, laparoscopic (N/A, 6/11/2021). Social History:   reports that he quit smoking about 31 years ago. His smoking use included cigarettes. He has a 1.25 pack-year smoking history. He has never used smokeless tobacco. He reports current alcohol use of about 2.0 standard drinks per week. He reports that he does not use drugs. Family History:  No evidence for sudden cardiac death or premature CAD    Home Medications:  Were reviewed and are listed in nursing record. and/or listed below  Prior to Admission medications    Medication Sig Start Date End Date Taking? Authorizing Provider   empagliflozin (JARDIANCE) 10 MG tablet Take 1 tablet by mouth daily 9/14/22   Gregorio Mccullough MD   spironolactone (ALDACTONE) 25 MG tablet TAKE 1 TABLET EVERY DAY 8/14/22   Gregorio Mccullough MD   atorvastatin (LIPITOR) 80 MG tablet TAKE 1 TABLET EVERY DAY 8/14/22   Gregorio Mccullough MD   carvedilol (COREG) 25 MG tablet TAKE 1 TABLET TWICE DAILY WITH MEALS 8/14/22   Gregorio Mccullough MD   methylPREDNISolone (MEDROL DOSEPACK) 4 MG tablet Take by mouth as directed.  7/26/22   Martin Bazan MD gabapentin (NEURONTIN) 100 MG capsule Take one tablet once daily for 2 days. Then take one tablet twice daily for 2 days. Then take one tablet three times daily for rest 7/26/22 8/26/22  Paradise Painter MD   diclofenac sodium (VOLTAREN) 1 % GEL Apply 4 g topically 4 times daily as needed for Pain 7/26/22   Paradise Painter MD   tiZANidine (ZANAFLEX) 2 MG tablet Take 1 tablet by mouth nightly as needed (Back pain) 7/15/22   Dyllan Olivarez MD   metFORMIN (GLUCOPHAGE) 500 MG tablet Take 1 tablet by mouth daily (with breakfast) 6/14/22   Dyllan Olivarez MD   meclizine (ANTIVERT) 25 MG tablet TAKE 1 TABLET THREE TIMES DAILY AS NEEDED FOR  DIZZINESS 6/10/22   ARACELY Gonzalez   amiodarone (CORDARONE) 200 MG tablet TAKE 1 TABLET EVERY DAY 10/4/21   Miguel Engel MD   furosemide (LASIX) 20 MG tablet Take 10 mg by mouth daily    Historical Provider, MD   omeprazole (PRILOSEC) 20 MG delayed release capsule Take 20 mg by mouth daily    Historical Provider, MD   aspirin EC 81 MG EC tablet Take 1 tablet by mouth daily. 2/22/13   Miguel Engel MD        Allergies:  Patient has no known allergies. Review of Systems:       Constitutional: there has been no unanticipated weight loss. There's been no change in energy level, sleep pattern, or activity level. Eyes: No visual changes or diplopia. No scleral icterus. ENT: No Headaches, hearing loss or vertigo. No mouth sores or sore throat. Cardiovascular: No loss of consciousness. No hemoptysis, pleuritic pain, or phlebitis. Respiratory: No cough or wheezing, no sputum production. No hematemesis. Gastrointestinal: No abdominal pain, appetite loss, blood in stools. No change in bowel or bladder habits. Genitourinary: No dysuria, trouble voiding, or hematuria. Musculoskeletal:  No gait disturbance, weakness or joint complaints. Integumentary: No rash or pruritis. All other systems reviewed negative as done. Areli Santiago     Physical Examination: Vitals:    09/18/22 0753   BP: 128/67   Pulse: 82   Resp: 18   Temp: 97.5 °F (36.4 °C)   SpO2: 97%    Weight: 216 lb 14.9 oz (98.4 kg)         General Appearance:  Alert, cooperative, no distress, appears stated age   Head:  Normocephalic, without obvious abnormality, atraumatic   Eyes:  PERRL, conjunctiva/corneas clear       Nose: Nares normal, no drainage or sinus tenderness   Throat: Lips, mucosa, and tongue normal   Neck: Supple, symmetrical, trachea midline       Lungs:   Decreased BS, respirations unlabored   Chest Wall:  No tenderness or deformity   Heart:  Irreg/irreg rate and rhythm, S1, S2 normal, no murmur, rub or gallop   Abdomen:   Soft, non-tender, bowel sounds active all four quadrants           Extremities: Extremities normal, atraumatic, no cyanosis or edema   Pulses: 2+ and symmetric   Skin: Skin color, texture, turgor normal, no rashes or lesions   Pysch: Normal mood and affect   Neurologic: Normal gross motor and sensory exam.         Labs  CBC:   Lab Results   Component Value Date/Time    WBC 13.0 09/17/2022 09:19 PM    RBC 5.23 09/17/2022 09:19 PM    HGB 16.5 09/17/2022 09:19 PM    HCT 49.0 09/17/2022 09:19 PM    MCV 93.8 09/17/2022 09:19 PM    RDW 14.0 09/17/2022 09:19 PM     09/17/2022 09:19 PM     CMP:    Lab Results   Component Value Date/Time     09/18/2022 04:37 AM    K 4.5 09/18/2022 04:37 AM    K 4.9 09/17/2022 09:19 PM     09/18/2022 04:37 AM    CO2 28 09/18/2022 04:37 AM    BUN 27 09/18/2022 04:37 AM    CREATININE 1.5 09/18/2022 04:37 AM    GFRAA 55 09/18/2022 04:37 AM    GFRAA >60 02/22/2013 01:50 PM    AGRATIO 1.6 09/17/2022 09:19 PM    LABGLOM 45 09/18/2022 04:37 AM    GLUCOSE 111 09/18/2022 04:37 AM    PROT 7.7 09/17/2022 09:19 PM    PROT 7.1 05/09/2011 01:45 PM    CALCIUM 9.0 09/18/2022 04:37 AM    BILITOT 0.7 09/17/2022 09:19 PM    ALKPHOS 94 09/17/2022 09:19 PM    AST 21 09/17/2022 09:19 PM    ALT 28 09/17/2022 09:19 PM     PT/INR:  No results found for: PTINR  Lab Results   Component Value Date    CKTOTAL 48 10/25/2011    CKMB 1.14 10/25/2011    TROPONINI <0.01 09/17/2022       EKG:  I have reviewed EKG with the following interpretation:  Impression:  personally reviewed, afib with RVR, poor R wave progression. Assessment  Patient Active Problem List   Diagnosis    Encounter for long-term (current) use of other medications    Osteoarthritis of shoulder    SVT (supraventricular tachycardia),paroxysmal.    New onset a-fib ([facility] [address]    Long term current use of anticoagulant therapy    Sleep apnea-on CPAP. HTN (hypertension)    Hyperlipidemia    Lumbar spine strain    Bunion of great toe of right foot    Chronic diastolic CHF (congestive heart failure) (HCC)    Quadriceps tendinitis-right    Closed nondisplaced fracture of left pubis with routine healing    Hordeolum externum of right upper eyelid    Seasonal allergic rhinitis    Vestibular dizziness    Plantar fasciitis, right    Morbid obesity with BMI of 40.0-44.9, adult (HCC)    Shortness of breath    Thrombocytopenia (HCC)    Neck pain on right side    Hyperglycemia    COVID-19 virus infection    Type 2 diabetes mellitus    Chronic renal disease, stage III ([facility] [address] [549432]    Acute right-sided low back pain    Lumbar spondylolysis    DDD (degenerative disc disease), lumbar    Acute respiratory failure (HCC)    Heart failure, systolic, acute ([facility])    ABDIFATAH (acute kidney injury) ([facility] [address]         Plan:    Acute on chronic CHF. Possibly ppt by afib. Remains afib. HR controlled currently. Continue diuresis with IV lasix. Watch cr. AC with wt based heparin for now for afib. Continue coreg. On amio currently. Echo to assess LV function. Hx cardiomyopathy. Hold ace for now in view or renal insufficiency.

## 2022-09-19 VITALS
HEART RATE: 78 BPM | WEIGHT: 215.39 LBS | RESPIRATION RATE: 18 BRPM | DIASTOLIC BLOOD PRESSURE: 74 MMHG | HEIGHT: 66 IN | OXYGEN SATURATION: 94 % | SYSTOLIC BLOOD PRESSURE: 115 MMHG | BODY MASS INDEX: 34.62 KG/M2 | TEMPERATURE: 97.6 F

## 2022-09-19 LAB
ANION GAP SERPL CALCULATED.3IONS-SCNC: 13 MMOL/L (ref 3–16)
ANTI-XA UNFRAC HEPARIN: 0.47 IU/ML (ref 0.3–0.7)
ANTI-XA UNFRAC HEPARIN: 0.57 IU/ML (ref 0.3–0.7)
BASOPHILS ABSOLUTE: 0.1 K/UL (ref 0–0.2)
BASOPHILS RELATIVE PERCENT: 0.7 %
BUN BLDV-MCNC: 30 MG/DL (ref 7–20)
CALCIUM SERPL-MCNC: 8.7 MG/DL (ref 8.3–10.6)
CHLORIDE BLD-SCNC: 99 MMOL/L (ref 99–110)
CO2: 24 MMOL/L (ref 21–32)
CREAT SERPL-MCNC: 1.4 MG/DL (ref 0.8–1.3)
EKG ATRIAL RATE: 326 BPM
EKG DIAGNOSIS: NORMAL
EKG Q-T INTERVAL: 434 MS
EKG QRS DURATION: 92 MS
EKG QTC CALCULATION (BAZETT): 525 MS
EKG R AXIS: -8 DEGREES
EKG T AXIS: 72 DEGREES
EKG VENTRICULAR RATE: 88 BPM
EOSINOPHILS ABSOLUTE: 0.2 K/UL (ref 0–0.6)
EOSINOPHILS RELATIVE PERCENT: 2 %
GFR AFRICAN AMERICAN: 59
GFR NON-AFRICAN AMERICAN: 49
GLUCOSE BLD-MCNC: 120 MG/DL (ref 70–99)
GLUCOSE BLD-MCNC: 122 MG/DL (ref 70–99)
HCT VFR BLD CALC: 44.2 % (ref 40.5–52.5)
HEMOGLOBIN: 14.9 G/DL (ref 13.5–17.5)
LV EF: 48 %
LVEF MODALITY: NORMAL
LYMPHOCYTES ABSOLUTE: 2.4 K/UL (ref 1–5.1)
LYMPHOCYTES RELATIVE PERCENT: 31.2 %
MAGNESIUM: 2 MG/DL (ref 1.8–2.4)
MCH RBC QN AUTO: 31.3 PG (ref 26–34)
MCHC RBC AUTO-ENTMCNC: 33.7 G/DL (ref 31–36)
MCV RBC AUTO: 93 FL (ref 80–100)
MONOCYTES ABSOLUTE: 0.9 K/UL (ref 0–1.3)
MONOCYTES RELATIVE PERCENT: 12.5 %
NEUTROPHILS ABSOLUTE: 4 K/UL (ref 1.7–7.7)
NEUTROPHILS RELATIVE PERCENT: 53.6 %
PDW BLD-RTO: 14 % (ref 12.4–15.4)
PERFORMED ON: ABNORMAL
PLATELET # BLD: 128 K/UL (ref 135–450)
PMV BLD AUTO: 10 FL (ref 5–10.5)
POTASSIUM SERPL-SCNC: 4.2 MMOL/L (ref 3.5–5.1)
RBC # BLD: 4.75 M/UL (ref 4.2–5.9)
SODIUM BLD-SCNC: 136 MMOL/L (ref 136–145)
URINE CULTURE, ROUTINE: NORMAL
WBC # BLD: 7.5 K/UL (ref 4–11)

## 2022-09-19 PROCEDURE — 85520 HEPARIN ASSAY: CPT

## 2022-09-19 PROCEDURE — 6370000000 HC RX 637 (ALT 250 FOR IP)

## 2022-09-19 PROCEDURE — 6360000002 HC RX W HCPCS: Performed by: STUDENT IN AN ORGANIZED HEALTH CARE EDUCATION/TRAINING PROGRAM

## 2022-09-19 PROCEDURE — 99233 SBSQ HOSP IP/OBS HIGH 50: CPT | Performed by: INTERNAL MEDICINE

## 2022-09-19 PROCEDURE — 6360000002 HC RX W HCPCS

## 2022-09-19 PROCEDURE — 93010 ELECTROCARDIOGRAM REPORT: CPT | Performed by: INTERNAL MEDICINE

## 2022-09-19 PROCEDURE — 80048 BASIC METABOLIC PNL TOTAL CA: CPT

## 2022-09-19 PROCEDURE — 2580000003 HC RX 258

## 2022-09-19 PROCEDURE — 80061 LIPID PANEL: CPT

## 2022-09-19 PROCEDURE — 85025 COMPLETE CBC W/AUTO DIFF WBC: CPT

## 2022-09-19 PROCEDURE — 83735 ASSAY OF MAGNESIUM: CPT

## 2022-09-19 PROCEDURE — 36415 COLL VENOUS BLD VENIPUNCTURE: CPT

## 2022-09-19 PROCEDURE — C8929 TTE W OR WO FOL WCON,DOPPLER: HCPCS

## 2022-09-19 RX ORDER — FUROSEMIDE 40 MG/1
40 TABLET ORAL DAILY
Status: DISCONTINUED | OUTPATIENT
Start: 2022-09-20 | End: 2022-09-19 | Stop reason: HOSPADM

## 2022-09-19 RX ORDER — FUROSEMIDE 40 MG/1
40 TABLET ORAL DAILY
Qty: 30 TABLET | Refills: 0 | Status: SHIPPED | OUTPATIENT
Start: 2022-09-20 | End: 2022-09-25 | Stop reason: SDUPTHER

## 2022-09-19 RX ADMIN — SODIUM CHLORIDE, PRESERVATIVE FREE 10 ML: 5 INJECTION INTRAVENOUS at 09:13

## 2022-09-19 RX ADMIN — FUROSEMIDE 40 MG: 10 INJECTION, SOLUTION INTRAMUSCULAR; INTRAVENOUS at 09:12

## 2022-09-19 RX ADMIN — HEPARIN SODIUM AND DEXTROSE 12 UNITS/KG/HR: 10000; 5 INJECTION INTRAVENOUS at 11:43

## 2022-09-19 RX ADMIN — CARVEDILOL 25 MG: 25 TABLET, FILM COATED ORAL at 09:12

## 2022-09-19 RX ADMIN — ATORVASTATIN CALCIUM 80 MG: 80 TABLET, FILM COATED ORAL at 09:12

## 2022-09-19 RX ADMIN — ASPIRIN 81 MG: 81 TABLET, COATED ORAL at 09:12

## 2022-09-19 RX ADMIN — AMIODARONE HYDROCHLORIDE 200 MG: 200 TABLET ORAL at 09:12

## 2022-09-19 RX ADMIN — OMEPRAZOLE 20 MG: 20 CAPSULE, DELAYED RELEASE ORAL at 08:15

## 2022-09-19 ASSESSMENT — PAIN SCALES - GENERAL
PAINLEVEL_OUTOF10: 0

## 2022-09-19 NOTE — PLAN OF CARE
Problem: Discharge Planning  Goal: Discharge to home or other facility with appropriate resources  9/19/2022 0257 by Sebastian Real RN  Outcome: Progressing  Flowsheets  Taken 9/19/2022 0253  Discharge to home or other facility with appropriate resources: Identify barriers to discharge with patient and caregiver  Taken 9/18/2022 2356  Discharge to home or other facility with appropriate resources: Identify barriers to discharge with patient and caregiver  Note: From home. No D/C needs identified at this time. Problem: Safety - Adult  Goal: Free from fall injury  9/19/2022 0257 by Sebastian Real RN  Outcome: Progressing  Flowsheets (Taken 9/19/2022 0253)  Free From Fall Injury: Instruct family/caregiver on patient safety  Note: Identified as a medium fall risk. Fall precautions in place and bed alarm in use. Patient using call light appropriately for assistance OOB. Bed in locked and low position. SR up x2. Problem: Respiratory - Adult  Goal: Achieves optimal ventilation and oxygenation  Outcome: Progressing  Flowsheets (Taken 9/19/2022 0253)  Achieves optimal ventilation and oxygenation: Assess for changes in respiratory status  Note: Lungs with crackles mid-base right lung. 94% on RA. Denies SOB. Reports SOSA exertion is better. Problem: Cardiovascular - Adult  Goal: Maintains optimal cardiac output and hemodynamic stability  Outcome: Progressing  Flowsheets (Taken 9/19/2022 0257)  Maintains optimal cardiac output and hemodynamic stability: Monitor blood pressure and heart rate  Note: VSS. Afib on Tele. On Heparin gtt.      Problem: Metabolic/Fluid and Electrolytes - Adult  Goal: Electrolytes maintained within normal limits  Recent Flowsheet Documentation  Taken 9/18/2022 2356 by Sebastian Real RN  Electrolytes maintained within normal limits: Monitor labs and assess patient for signs and symptoms of electrolyte imbalances

## 2022-09-19 NOTE — PROGRESS NOTES
Discharge note: Patient has been seen by doctor. Discharge order obtained, and discharge instructions reviewed. Patient educated, using the teach back method, about follow up instructions and discharge instructions. A completed copy of the AVS instructions given to patient and all questions answered. IV catheter removed without complaints, catheter intact, site WNL. Discharged to [facility] via wheel chair per transportation.      Electronically signed by Samuel Powers RN on 9/19/2022 at 4:19 PM

## 2022-09-19 NOTE — PROGRESS NOTES
[facility] Daily Progress Note      Admit Date:  9/17/2022    Subjective:  Mr. Nia Heard was seen and examined. F/U CHF/afib. Feeling better. Breathing at baseline. No chest pain.      Objective:   /70   Pulse 79   Temp 97.6 °F (36.4 °C) (Oral)   Resp 17   Ht 5' 6\" (1.676 m)   Wt 215 lb 6.2 oz (97.7 kg)   SpO2 93%   BMI 34.76 kg/m²     Intake/Output Summary (Last 24 hours) at 9/19/2022 1056  Last data filed at 9/19/2022 0746  Gross per 24 hour   Intake 1304.72 ml   Output 1325 ml   Net -20.28 ml       TELEMETRY: Atrial fibrillation     Physical Exam:  General:  Awake, alert, NAD  Skin:  Warm and dry  Neck:  JVP difficult  Chest:  decreased BS, respiration normal  Cardiovascular:  Irreg/irreg S1S2  Abdomen:  Soft nontender  Extremities:  no edema    Medications:    [START ON 9/20/2022] furosemide  40 mg Oral Daily    amiodarone  200 mg Oral Daily    aspirin EC  81 mg Oral Daily    atorvastatin  80 mg Oral Daily    carvedilol  25 mg Oral BID WC    omeprazole  20 mg Oral QAM AC    sodium chloride flush  5-40 mL IntraVENous 2 times per day    insulin lispro  0-4 Units SubCUTAneous TID WC    insulin lispro  0-4 Units SubCUTAneous Nightly      sodium chloride      dextrose      heparin (PORCINE) Infusion 12 Units/kg/hr (09/19/22 0700)     diclofenac sodium, tiZANidine, sodium chloride flush, sodium chloride, ondansetron **OR** ondansetron, polyethylene glycol, acetaminophen **OR** acetaminophen, glucose, dextrose bolus **OR** dextrose bolus, glucagon (rDNA), dextrose, heparin (porcine), heparin (porcine), nitroGLYCERIN    Lab Data:  CBC:   Recent Labs     09/17/22 2119 09/18/22  1834 09/19/22  0314   WBC 13.0* 8.8 7.5   HGB 16.5 14.8 14.9   HCT 49.0 44.5 44.2   MCV 93.8 92.9 93.0    158 128*     BMP:   Recent Labs     09/17/22 2119 09/18/22  0437 09/19/22  0314    143 136   K 4.9 4.5 4.2    102 99   CO2 22 28 24   BUN 26* 27* 30*   CREATININE 1.6* 1.5* 1.4*     LIVER PROFILE: Recent Labs     09/17/22 2119   AST 21   ALT 28   BILITOT 0.7   ALKPHOS 94     PT/INR:   Recent Labs     09/17/22 2119 09/18/22  1834   PROTIME 14.3 15.2*   INR 1.11 1.20*     APTT:   Recent Labs     09/18/22  1834   APTT 56.0*     BNP:  No results for input(s): BNP in the last 72 hours. IMAGING:     Assessment:  Patient Active Problem List    Diagnosis Date Noted    Acute respiratory failure ([address] 09/18/2022    Heart failure, systolic, acute ([address] 65/24/1439    ABDIFATAH (acute kidney injury) ([facility] 09/18/2022    Acute right-sided low back pain 07/26/2022    Lumbar spondylolysis 07/26/2022    DDD (degenerative disc disease), lumbar 07/26/2022    Chronic renal disease, stage III [facility]) [409414] 06/14/2022    Type 2 diabetes mellitus 02/10/2022    COVID-19 virus infection 01/05/2022    Neck pain on right side 10/04/2021    Hyperglycemia 10/04/2021    Thrombocytopenia ([address] 06/11/2021    Shortness of breath 06/09/2021    Morbid obesity with BMI of 40.0-44.9, adult ([address] 04/26/2021    Plantar fasciitis, right 09/30/2020    Vestibular dizziness 08/10/2020    Seasonal allergic rhinitis 04/29/2019    Hordeolum externum of right upper eyelid 01/14/2019    Closed nondisplaced fracture of left pubis with routine healing 10/17/2018    Quadriceps tendinitis-right 08/29/2016    Chronic diastolic CHF (congestive heart failure) ([address] 12/21/2015    Bunion of great toe of right foot 05/18/2015    Lumbar spine strain 12/29/2014    HTN (hypertension) 10/07/2013    Hyperlipidemia 10/07/2013    Sleep apnea-on CPAP. 06/04/2012    Long term current use of anticoagulant therapy 11/01/2011    New onset a-fib ([address] 10/25/2011    SVT (supraventricular tachycardia),paroxysmal. 05/11/2011    Encounter for long-term (current) use of other medications 03/22/2011    Osteoarthritis of shoulder 03/22/2011       Plan:  Breathing at baseline. On RA. Remains afib. HR reasonable. Talked to his primary cardiologist, Dr Adithya Rome yesterday.   Tx CHF, HR control and he will follow closely. AC with Eliquis/lasix. [facility] for home. Follow up with Dr Nasreen Lopez.       Core Measures:  Discharge instructions:   LVEF documented:   ACEI for LV dysfunction:   Smoking Cessation:    Jan Ignacio MD, MD 9/19/2022 10:56 AM

## 2022-09-19 NOTE — PROGRESS NOTES
Patient seen and examined, family present at bedside  Doing well. Off oxygen. I ambulated him in the room, he is doing pretty good, did not have any signs of dyspnea. He received IV Lasix 40 mg in the hospital, at home he was only on 10 mg of oral Lasix. I will continue oral Lasix 20 mg once daily. Echocardiogram was done this morning awaiting results  Will get meds to beds  Discussed with nursing  If echocardiogram without significant abnormalities he can discharge home and follow-up with cardiology, he is an outpatient point with Dr. Lucy Garibay is on Wednesday.   Formal discharge summary to follow    Jon Urias MD  Hospitalist  Attending Physician

## 2022-09-19 NOTE — DISCHARGE SUMMARY
INTERNAL MEDICINE DEPARTMENT AT [address] [place] [address]  DISCHARGE SUMMARY    Patient ID: Blas Walton                                             Discharge Date: 9/19/2022   Patient's PCP: Olga Lopez MD                                          Discharge Physician: Joy Lawler MD    Admit Date: 9/17/2022   Admitting Physician: Cristal Corrigan MD    PROBLEMS DURING HOSPITALIZATION:  Present on Admission:   Acute respiratory failure ([address]   Heart failure, systolic, acute ([address]   ABDIFATAH (acute kidney injury) ([address]   New onset a-fib ([address]      DISCHARGE DIAGNOSES:  Acute on chronic CHF exacerbation  Atrial fibrillation   ABDIFATAH    HPI:   is a 81 y/o male with a PMHx of CHF, aFib not on a/c and DMII who presents to the ED complaining of acute onset SOB. 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 Patient had one episode of this a long time ago but does not remember what may have triggered. Patient also report a swollen feeling in his abdomen. Because the patient never had the opportunity to lie down, unable to assess orthopnea. Patient denies cough, chest pain, hemoptysis, fever/chills, nausea/vomiting, abdominal pain and dysuria. Patient reportedly took all of his blood pressure medication earlier that day and had no alteration in his diet. He does have a recent history of travelling to [facility]. Echo: Last recorded Echo was in 2016 and showed en EF of 40-45%  When EMS arrived, patient's O2 was in the low 80s. He was placed in Cpap and brought to the ED, where he was transitioned to BiPaP. The following issues were addressed during hospitalization:  CHF exacerbation  - likely 2/2 increased salt intake in setting of recent travel. Diuresed well with lasix. Discharged on oral lasix 2x home dose with plan for close follow up with cardiologist this week.  Repeat echocardiogram completed. Atrial fibrillation  - Likely 2/2 to CHF exacerbation. Cardiology consulted, controlled with amiodarone and coreg. Initially on heparin gtt, transitioned to Eliquis on discharge. ABDIFATAH  - Likely 2/2 CHF exacerbation, improved with diuresis     Physical Exam:  Physical Exam   Constitutional:       General: He is not in acute distress. Appearance: Normal appearance. He is not ill-appearing or diaphoretic. HENT:      Head: Normocephalic and atraumatic. Eyes:      Extraocular Movements: Extraocular movements intact. Cardiovascular:      Rate and Rhythm: Normal rate. Rhythm irregular. Heart sounds: No murmur heard. No friction rub. No gallop. Pulmonary:      Breath sounds: Clear to auscultation bilaterally, no wheezing or rhonchi. Abdominal:      General: There is no distension. Tenderness: There is no abdominal tenderness. There is no guarding or rebound. Musculoskeletal:      Right lower leg: No edema. Left lower leg: No edema. Neurological:      Mental Status: He is alert.      Consults: Cardiology   Significant Diagnostic Studies:  CXR, CTA Chest W WO, Echocardiogram  Treatments: Diuresis, amiodarone, coreg, anticoagulation  Disposition: Home  Discharged Condition: Stable  Follow Up: Primary Care Physician in one week    DISCHARGE MEDICATION:       Medication List        ASK your doctor about these medications      amiodarone 200 MG tablet  Commonly known as: CORDARONE  TAKE 1 TABLET EVERY DAY     aspirin EC 81 MG EC tablet     atorvastatin 80 MG tablet  Commonly known as: LIPITOR  TAKE 1 TABLET EVERY DAY     carvedilol 25 MG tablet  Commonly known as: COREG  TAKE 1 TABLET TWICE DAILY WITH MEALS     diclofenac sodium 1 % Gel  Commonly known as: VOLTAREN  Apply 4 g topically 4 times daily as needed for Pain     empagliflozin 10 MG tablet  Commonly known as: Jardiance  Take 1 tablet by mouth daily     furosemide 20 MG tablet  Commonly known as: LASIX     gabapentin 100 MG capsule  Commonly known as: NEURONTIN  Take one tablet once daily for 2 days. Then take one tablet twice daily for 2 days. Then take one tablet three times daily for rest     meclizine 25 MG tablet  Commonly known as: ANTIVERT  TAKE 1 TABLET THREE TIMES DAILY AS NEEDED FOR  DIZZINESS     metFORMIN 500 MG tablet  Commonly known as: GLUCOPHAGE  Take 1 tablet by mouth daily (with breakfast)     methylPREDNISolone 4 MG tablet  Commonly known as: MEDROL DOSEPACK  Take by mouth as directed.      omeprazole 20 MG delayed release capsule  Commonly known as: PRILOSEC     spironolactone 25 MG tablet  Commonly known as: ALDACTONE  TAKE 1 TABLET EVERY DAY     tiZANidine 2 MG tablet  Commonly known as: ZANAFLEX  Take 1 tablet by mouth nightly as needed (Back pain)               Activity: activity as tolerated  Diet: cardiac diet  Wound Care: none needed    Time Spent on discharge is more than 45 minutes    Signed:  Carly Will MD,  PGY-1  9/19/2022

## 2022-09-20 ENCOUNTER — CARE COORDINATION (OUTPATIENT)
Dept: CASE MANAGEMENT | Age: 82
End: 2022-09-20

## 2022-09-20 DIAGNOSIS — I50.21 HEART FAILURE, SYSTOLIC, ACUTE (HCC): ICD-10-CM

## 2022-09-20 DIAGNOSIS — I48.91 NEW ONSET A-FIB (HCC): ICD-10-CM

## 2022-09-20 DIAGNOSIS — J96.00 ACUTE RESPIRATORY FAILURE, UNSPECIFIED WHETHER WITH HYPOXIA OR HYPERCAPNIA (HCC): Primary | ICD-10-CM

## 2022-09-20 DIAGNOSIS — N17.9 AKI (ACUTE KIDNEY INJURY) (HCC): ICD-10-CM

## 2022-09-20 PROCEDURE — 1111F DSCHRG MED/CURRENT MED MERGE: CPT | Performed by: INTERNAL MEDICINE

## 2022-09-20 NOTE — CARE COORDINATION
[facility] Transitions Initial Follow Up Call    Call within 2 business days of discharge: Yes    Patient: Montez Lee Patient : 1940   MRN: [id number]  Reason for Admission: Acute resp. failure  Discharge Date: 22 RARS: Readmission Risk Score: 11.4      Last Discharge [facility]       Date Complaint Diagnosis Description Type Department Provider    22 Shortness of Breath Acute pulmonary edema ([address] . .. ED to Hosp-Admission (Discharged) (ADMITTED) Liz Jones 4 PCU Zechariah Patino MD; José Miguel Hopson. .. Spoke with: Vincenzo  Patient stated he is doing really good. He denies sob, fever, cp, chills, cough, fatigue, weakness, palpitations or swelling. Appetite and fluid intake is good. No hhc needed. Patient walks independently. Patient stated he is back to normal. He has follow up appointment tomorrow. Medication reconciliation and 1111f completed. No elimination problems of bladder or bowel. No questions, needs or concerns. Patient agreeable to future calls. Will continue to follow. Transitions of Care Initial Call    Was this an external facility discharge? No Discharge Facility: [facility] to be reviewed by the provider   Additional needs identified to be addressed with provider: No  none             Method of communication with provider : none    Advance Care Planning:   Does patient have an Advance Directive: not on file; education provided. LPN Care Coordinator contacted the patient by telephone to perform post hospital discharge assessment. Verified name and  with patient as identifiers. Provided introduction to self, and explanation of the LPN CC role. LPN CC reviewed discharge instructions, medical action plan and red flags with patient who verbalized understanding. Patient given an opportunity to ask questions and does not have any further questions or concerns at this time.  Were discharge instructions available to patient? Yes. Reviewed appropriate site of care based on symptoms and resources available to patient including: PCP  Specialist  When to call 911. The patient agrees to contact the PCP office for questions related to their healthcare. Medication reconciliation was performed with family, who verbalizes understanding of administration of home medications. Advised obtaining a 90-day supply of all daily and as-needed medications. Was patient discharged with a pulse oximeter? no    LPN CC provided contact information. Plan for follow-up call in 5-7 days based on severity of symptoms and risk factors.   Plan for next call: self management-   follow up appointment-results of appt      Non-face-to-face services provided:  Obtained and reviewed discharge summary and/or continuity of care documents      Care Transitions 24 Hour Call    Do you have all of your prescriptions and are they filled?: Yes  Care Transitions Interventions         Follow Up  Future Appointments   Date Time Provider Jose Figueredo   10/17/2022  7:45 AM Gregorio Mccullough MD [facility] [employer]   12/14/2022  8:15 AM Gregorio Mccullough MD [facility]       Joel Dumont LPN

## 2022-09-21 LAB
CHOLESTEROL, TOTAL: 144 MG/DL (ref 0–199)
HDLC SERPL-MCNC: 36 MG/DL (ref 40–60)
LDL CHOLESTEROL CALCULATED: 81 MG/DL
TRIGL SERPL-MCNC: 135 MG/DL (ref 0–150)
VLDLC SERPL CALC-MCNC: 27 MG/DL

## 2022-09-22 ENCOUNTER — FOLLOWUP TELEPHONE ENCOUNTER (OUTPATIENT)
Dept: INPATIENT UNIT | Age: 82
End: 2022-09-22

## 2022-09-22 DIAGNOSIS — E11.22 TYPE 2 DIABETES MELLITUS WITH STAGE 3A CHRONIC KIDNEY DISEASE, WITHOUT LONG-TERM CURRENT USE OF INSULIN (HCC): ICD-10-CM

## 2022-09-22 DIAGNOSIS — M51.36 DDD (DEGENERATIVE DISC DISEASE), LUMBAR: ICD-10-CM

## 2022-09-22 DIAGNOSIS — M54.50 ACUTE RIGHT-SIDED LOW BACK PAIN, UNSPECIFIED WHETHER SCIATICA PRESENT: ICD-10-CM

## 2022-09-22 DIAGNOSIS — M54.50 LUMBAR PAIN: ICD-10-CM

## 2022-09-22 DIAGNOSIS — M43.06 LUMBAR SPONDYLOLYSIS: ICD-10-CM

## 2022-09-22 DIAGNOSIS — N18.31 TYPE 2 DIABETES MELLITUS WITH STAGE 3A CHRONIC KIDNEY DISEASE, WITHOUT LONG-TERM CURRENT USE OF INSULIN (HCC): ICD-10-CM

## 2022-09-25 RX ORDER — GABAPENTIN 100 MG/1
CAPSULE ORAL
Qty: 180 CAPSULE | Refills: 0 | Status: SHIPPED | OUTPATIENT
Start: 2022-09-25 | End: 2022-11-17

## 2022-09-25 RX ORDER — FUROSEMIDE 40 MG/1
40 TABLET ORAL DAILY
Qty: 30 TABLET | Refills: 0 | Status: SHIPPED | OUTPATIENT
Start: 2022-09-25 | End: 2022-10-14 | Stop reason: SDUPTHER

## 2022-09-25 RX ORDER — AMIODARONE HYDROCHLORIDE 200 MG/1
TABLET ORAL
Qty: 30 TABLET | Refills: 1 | Status: SHIPPED | OUTPATIENT
Start: 2022-09-25

## 2022-09-27 ENCOUNTER — CARE COORDINATION (OUTPATIENT)
Dept: CASE MANAGEMENT | Age: 82
End: 2022-09-27

## 2022-09-27 NOTE — CARE COORDINATION
[facility] Care Transitions Follow Up Call    Care Transition Nurse contacted the family by telephone to follow up after admission on 2022. Verified name and  with family as identifiers. Patient: Johana Ayala  Patient : 1940   MRN: [id number]   Reason for Admission: Acute Respiratory Failure, CHF  Discharge Date: 22 RARS: Readmission Risk Score: 11.4      Needs to be reviewed by the provider   Additional needs identified to be addressed with provider: No  none             Method of communication with provider: none. CTN spoke with patients spouse this afternoon for follow up CTN call. Spouse states patient is doing really well, reporting no complaints of any fever, chills, nausea, vomiting, chest pain, SOB or cough. Patient with no Patient with no congestion, pain, difficulty emptying bladder, LE edema, feeling lightheaded, dizziness, and heart palpitations. Addressed changes since last contact:  none  Discussed follow-up appointments. If no appointment was previously scheduled, appointment scheduling offered: Yes. Is follow up appointment scheduled within 7 days of discharge? No.    Follow Up  Future Appointments   Date Time Provider Jose Figueredo   2022  9:15 AM Romaine Smith MD [employer]   10/17/2022  7:45 AM Romaine Smith MD [employer]   2022  8:15 AM Romaine Smith MD [address] Transition Nurse reviewed red flags with family and discussed any barriers to care and/or understanding of plan of care after discharge. Discussed appropriate site of care based on symptoms and resources available to patient including: PCP  Specialist  Urgent care clinics  [facility]   Provider home visits. The family agrees to contact the PCP office for questions related to their healthcare. Advance Care Planning:   not on file.      Patients top risk factors for readmission: medical condition-CHF, medication management, multiple health system providers, and polypharmacy  Interventions to address risk factors: Education of patient/family/caregiver/guardian to support self-management-Spouse instructed to continue to monitor for any weight gain, BLE Edema, as well as monitoring for any of the other above noted s/s, reporting to MD immediately. Offered patient enrollment in the Remote Patient Monitoring (RPM) program for in-home monitoring: Patient declined. Care Transitions Subsequent and Final Call    Schedule Follow Up Appointment with PCP: Declined  Subsequent and Final Calls  Do you have any ongoing symptoms?: No  Have your medications changed?: No  Do you have any questions related to your medications?: No  Do you currently have any active services?: No  Do you have any needs or concerns that I can assist you with?: No  Identified Barriers: None  Care Transitions Interventions  No Identified Needs  Other Interventions:             Care Transition Nurse provided contact information for future needs. Plan for follow-up call in 5-7 days based on severity of symptoms and risk factors. Plan for next call: Any issues or concerns that may arise.     Thank Destiny You RN  Care Transition Coordinator  Contact [facility]:[phone]

## 2022-09-29 ENCOUNTER — OFFICE VISIT (OUTPATIENT)
Dept: INTERNAL MEDICINE CLINIC | Age: 82
End: 2022-09-29
Payer: MEDICARE

## 2022-09-29 VITALS
SYSTOLIC BLOOD PRESSURE: 110 MMHG | DIASTOLIC BLOOD PRESSURE: 60 MMHG | BODY MASS INDEX: 35.89 KG/M2 | WEIGHT: 222.38 LBS | OXYGEN SATURATION: 97 % | HEART RATE: 63 BPM

## 2022-09-29 DIAGNOSIS — D69.6 THROMBOCYTOPENIA, UNSPECIFIED (HCC): ICD-10-CM

## 2022-09-29 DIAGNOSIS — Z09 HOSPITAL DISCHARGE FOLLOW-UP: Primary | ICD-10-CM

## 2022-09-29 DIAGNOSIS — I48.91 NEW ONSET A-FIB (HCC): ICD-10-CM

## 2022-09-29 DIAGNOSIS — N17.9 AKI (ACUTE KIDNEY INJURY) (HCC): ICD-10-CM

## 2022-09-29 DIAGNOSIS — I50.21 HEART FAILURE, SYSTOLIC, ACUTE (HCC): ICD-10-CM

## 2022-09-29 PROCEDURE — 1111F DSCHRG MED/CURRENT MED MERGE: CPT | Performed by: INTERNAL MEDICINE

## 2022-09-29 PROCEDURE — 99495 TRANSJ CARE MGMT MOD F2F 14D: CPT | Performed by: INTERNAL MEDICINE

## 2022-09-29 ASSESSMENT — ENCOUNTER SYMPTOMS
COUGH: 0
SHORTNESS OF BREATH: 0
NAUSEA: 0
VOMITING: 0
SORE THROAT: 0
BLOOD IN STOOL: 0
ABDOMINAL PAIN: 0

## 2022-09-29 NOTE — PROGRESS NOTES
Post-Discharge Transitional Care Follow Up      Benjy Ca   YOB: 1940    Date of Office Visit:  9/29/2022  Date of Hospital Admission: 9/17/22  Date of Hospital Discharge: 9/19/22  Readmission Risk Score (high >=14%. Medium >=10%):Readmission Risk Score: 11.4      Care management risk score Rising risk (score 2-5) and Complex Care (Scores >=6): No Risk Score On File     Non face to face  following discharge, date last encounter closed (first attempt may have been earlier): 09/20/2022     Call initiated 2 business days of discharge: Yes     Hospital discharge follow-up  -     [place] DISCHARGE MEDS RECONCILED W/ CURRENT OUTPATIENT MED LIST  Thrombocytopenia, unspecified ([address]  - Stable   New onset a-fib ([address]  Stable, asymptomatic. Continue current dose of apixaban, amiodarone and carvedilol  Follows with cardiology. Recent CANELO showed blood clot in left atrium, patient has an upcoming CANELO in few weeks. ABDIFATAH (acute kidney injury) ([address]  Improving  Continue current dose of furosemide  Repeat CMP in 4 weeks  Heart failure, systolic, acute (HCC)  - Stable   - Continue current dose of furosemide, carvedilol, spironolactone. Medical Decision Making: high complexity  Return in 4 weeks (on 10/27/2022). Subjective:   HPI    Inpatient course: Discharge summary reviewed- see chart. Interval history/Current status: Patient is doing well since discharge from the hospital. Denies any palpitations, shortness of breath, chest pain or leg swelling. He is following with his cardiologist.  Patient is compliant with his medications. Patient Active Problem List   Diagnosis    Encounter for long-term (current) use of other medications    Osteoarthritis of shoulder    SVT (supraventricular tachycardia),paroxysmal.    New onset a-fib ([address]    Long term current use of anticoagulant therapy    Sleep apnea-on CPAP.     HTN (hypertension)    Hyperlipidemia    Lumbar spine strain    Bunion of great toe of right foot    Chronic diastolic CHF (congestive heart failure) ([facility])    Quadriceps tendinitis-right    Closed nondisplaced fracture of left pubis with routine healing    Hordeolum externum of right upper eyelid    Seasonal allergic rhinitis    Vestibular dizziness    Plantar fasciitis, right    Morbid obesity with BMI of 40.0-44.9, adult ([facility])    Shortness of breath    Thrombocytopenia ([facility])    Neck pain on right side    Hyperglycemia    COVID-19 virus infection    Type 2 diabetes mellitus    Chronic renal disease, stage III ([facility] [670053]    Acute right-sided low back pain    Lumbar spondylolysis    DDD (degenerative disc disease), lumbar    Acute respiratory failure ([facility])    Heart failure, systolic, acute ([facility]    ABDIFATAH (acute kidney injury) ([facility]       Medications listed as ordered at the time of discharge from hospital     Medication List            Accurate as of September 29, 2022  2:26 PM. If you have any questions, ask your nurse or doctor. CONTINUE taking these medications      amiodarone 200 MG tablet  Commonly known as: CORDARONE  TAKE 1 TABLET EVERY DAY     apixaban 5 MG Tabs tablet  Commonly known as: ELIQUIS  Take 1 tablet by mouth 2 times daily     aspirin EC 81 MG EC tablet     atorvastatin 80 MG tablet  Commonly known as: LIPITOR  TAKE 1 TABLET EVERY DAY     carvedilol 25 MG tablet  Commonly known as: COREG  TAKE 1 TABLET TWICE DAILY WITH MEALS     diclofenac sodium 1 % Gel  Commonly known as: VOLTAREN  Apply 4 g topically 4 times daily as needed for Pain     empagliflozin 10 MG tablet  Commonly known as: Jardiance  Take 1 tablet by mouth daily     furosemide 40 MG tablet  Commonly known as: LASIX  Take 1 tablet by mouth daily     gabapentin 100 MG capsule  Commonly known as: NEURONTIN  Take one tablet once daily for 2 days. Then take one tablet twice daily for 2 days.  Then take one tablet three times daily for rest     meclizine 25 MG tablet  Commonly known as: ANTIVERT  TAKE 1 TABLET THREE TIMES DAILY AS NEEDED FOR  DIZZINESS     metFORMIN 500 MG tablet  Commonly known as: GLUCOPHAGE  Take 1 tablet by mouth daily (with breakfast)     omeprazole 20 MG delayed release capsule  Commonly known as: PRILOSEC     spironolactone 25 MG tablet  Commonly known as: ALDACTONE  TAKE 1 TABLET EVERY DAY     tiZANidine 2 MG tablet  Commonly known as: ZANAFLEX  Take 1 tablet by mouth nightly as needed (Back pain)               Medications marked \"taking\" at this time  Outpatient Medications Marked as Taking for the 9/29/22 encounter (Office Visit) with Helder Thompson MD   Medication Sig Dispense Refill    amiodarone (CORDARONE) 200 MG tablet TAKE 1 TABLET EVERY DAY 30 tablet 1    furosemide (LASIX) 40 MG tablet Take 1 tablet by mouth daily 30 tablet 0    gabapentin (NEURONTIN) 100 MG capsule Take one tablet once daily for 2 days. Then take one tablet twice daily for 2 days. Then take one tablet three times daily for rest 180 capsule 0    metFORMIN (GLUCOPHAGE) 500 MG tablet Take 1 tablet by mouth daily (with breakfast) 90 tablet 0    apixaban (ELIQUIS) 5 MG TABS tablet Take 1 tablet by mouth 2 times daily 60 tablet 0    spironolactone (ALDACTONE) 25 MG tablet TAKE 1 TABLET EVERY DAY 90 tablet 1    atorvastatin (LIPITOR) 80 MG tablet TAKE 1 TABLET EVERY DAY 90 tablet 1    carvedilol (COREG) 25 MG tablet TAKE 1 TABLET TWICE DAILY WITH MEALS 180 tablet 1    diclofenac sodium (VOLTAREN) 1 % GEL Apply 4 g topically 4 times daily as needed for Pain 100 g 3    tiZANidine (ZANAFLEX) 2 MG tablet Take 1 tablet by mouth nightly as needed (Back pain) 10 tablet 0    meclizine (ANTIVERT) 25 MG tablet TAKE 1 TABLET THREE TIMES DAILY AS NEEDED FOR  DIZZINESS 30 tablet 0    omeprazole (PRILOSEC) 20 MG delayed release capsule Take 20 mg by mouth daily      aspirin EC 81 MG EC tablet Take 1 tablet by mouth daily.  30 tablet 3        Medications patient taking as of now reconciled against medications ordered at time of hospital discharge: Yes    Review of Systems   Constitutional:  Negative for fatigue and fever. HENT:  Negative for nosebleeds and sore throat. Respiratory:  Negative for cough and shortness of breath. Cardiovascular:  Negative for chest pain, palpitations and leg swelling. Gastrointestinal:  Negative for abdominal pain, blood in stool, nausea and vomiting. Neurological:  Negative for dizziness and weakness. Objective:    /60 (Site: Left Upper Arm, Position: Sitting, Cuff Size: Large Adult)   Pulse 63   Wt 222 lb 6 oz (100.9 kg)   SpO2 97%   BMI 35.89 kg/m²   Physical Exam  Constitutional:       Appearance: Normal appearance. HENT:      Head: Normocephalic and atraumatic. Eyes:      General: No scleral icterus. Conjunctiva/sclera: Conjunctivae normal.   Cardiovascular:      Rate and Rhythm: Normal rate and regular rhythm. Pulses: Normal pulses. Heart sounds: Normal heart sounds. Pulmonary:      Effort: Pulmonary effort is normal.      Breath sounds: Normal breath sounds. Musculoskeletal:         General: No swelling. Right lower leg: No edema. Left lower leg: No edema. Skin:     General: Skin is warm and dry. Neurological:      Mental Status: He is alert and oriented to person, place, and time. Mental status is at baseline. Psychiatric:         Mood and Affect: Mood normal.         Behavior: Behavior normal.       An electronic signature was used to authenticate this note.   --Judy Oviedo MD

## 2022-10-05 ENCOUNTER — CARE COORDINATION (OUTPATIENT)
Dept: CASE MANAGEMENT | Age: 82
End: 2022-10-05

## 2022-10-05 NOTE — CARE COORDINATION
[facility] Care Transitions Follow Up Call    Care Transition Nurse contacted the family by telephone to follow up after admission on 2022. Verified name and  with family as identifiers. Patient: Francoise Cameron  Patient : 1940   MRN: [id number]   Reason for Admission: CHF, Acute Respiratory Failure  Discharge Date: 22 RARS: Readmission Risk Score: 11.4      Needs to be reviewed by the provider   Additional needs identified to be addressed with provider: No  none             Method of communication with provider: none. CTN spoke with family, states patient is doing well, no reports of any weight gain, no BLE Edema, no fever, chills, nausea, vomiting, chest pain, SOB or cough. Patient denies having any congestion, pain, difficulty emptying bladder, feeling lightheaded, dizziness, and heart palpitations. Patient had follow up with PCP as well. No medication changes or other issues or concerns. Addressed changes since last contact:  none  Discussed follow-up appointments. If no appointment was previously scheduled, appointment scheduling offered: Yes. Is follow up appointment scheduled within 7 days of discharge? Yes. Follow Up  Future Appointments   Date Time Provider Jose Bea   10/31/2022  8:45 AM Beronica Hutchins MD [facility] [address]   2022  8:15 AM Beronica Hutchins MD [id number] [facility] Transition Nurse reviewed red flags with family and discussed any barriers to care and/or understanding of plan of care after discharge. Discussed appropriate site of care based on symptoms and resources available to patient including: PCP  Specialist  Urgent care clinics  [place]   When to call 911. The family agrees to contact the PCP office for questions related to their healthcare. Advance Care Planning:   not on file.      Patients top risk factors for readmission: medical condition-CHF, medication management, and polypharmacy  Interventions to address risk factors: Education of patient/family/caregiver/guardian to support self-management-Family instructed to continue to monitor for any of the above noted s/s, reporting to MD immediately. Also instructed to continue to make sure patient is monitor sodium intake and weight daily. Offered patient enrollment in the Remote Patient Monitoring (RPM) program for in-home monitoring: Patient declined. Care Transitions Subsequent and Final Call    Schedule Follow Up Appointment with PCP: Declined  Subsequent and Final Calls  Do you have any ongoing symptoms?: No  Have your medications changed?: No  Do you have any questions related to your medications?: No  Do you currently have any active services?: No  Do you have any needs or concerns that I can assist you with?: No  Identified Barriers: None  Care Transitions Interventions  No Identified Needs  Other Interventions:             Care Transition Nurse provided contact information for future needs. Plan for follow-up call in 5-7 days based on severity of symptoms and risk factors. Plan for next call: Any issues or concerns that may arise.     Thank Jeff Hammond RN  Care Transition Coordinator  Contact CNLIEV:[phone]

## 2022-10-10 ENCOUNTER — CARE COORDINATION (OUTPATIENT)
Dept: CASE MANAGEMENT | Age: 82
End: 2022-10-10

## 2022-10-10 NOTE — CARE COORDINATION
[facility] Transitions Follow Up Call    10/10/2022    Patient: Crys Urban  Patient : 1940   MRN: [id number]  Reason for Admission: Acute resp. failure  Discharge Date: 22 RARS: Readmission Risk Score: 11.4      Attempted to call patient for follow up transition call. No answer. Unable to leave voicemail stating this is the final call we will be making. No further outreach scheduled. Care Transitions Subsequent and Final Call    Subsequent and Final Calls  Care Transitions Interventions  Other Interventions:              Follow Up  Future Appointments   Date Time Provider Jose Figueredo   10/31/2022  8:45 AM Claude Rodriguez MD [address]   2022  8:15 AM Claude Rodriguez MD [address] [facility] [address]       Rocky Lopez LPN

## 2022-10-15 RX ORDER — FUROSEMIDE 40 MG/1
40 TABLET ORAL DAILY
Qty: 30 TABLET | Refills: 3 | Status: SHIPPED | OUTPATIENT
Start: 2022-10-15 | End: 2022-10-19

## 2022-10-19 RX ORDER — FUROSEMIDE 40 MG/1
TABLET ORAL
Qty: 30 TABLET | Refills: 1 | Status: SHIPPED | OUTPATIENT
Start: 2022-10-19

## 2022-10-19 NOTE — TELEPHONE ENCOUNTER
Last ov 09 29 22  Future Appointments   Date Time Provider Jose Bea   10/31/2022  8:45 AM Brea Lopze MD [address]   12/14/2022  8:15 AM Brea Lopez MD [facility]

## 2022-10-31 ENCOUNTER — OFFICE VISIT (OUTPATIENT)
Dept: INTERNAL MEDICINE CLINIC | Age: 82
End: 2022-10-31
Payer: MEDICARE

## 2022-10-31 VITALS
DIASTOLIC BLOOD PRESSURE: 70 MMHG | TEMPERATURE: 97.9 F | WEIGHT: 224.5 LBS | OXYGEN SATURATION: 97 % | SYSTOLIC BLOOD PRESSURE: 110 MMHG | BODY MASS INDEX: 36.24 KG/M2 | HEART RATE: 50 BPM

## 2022-10-31 DIAGNOSIS — E78.2 MIXED HYPERLIPIDEMIA: ICD-10-CM

## 2022-10-31 DIAGNOSIS — E11.22 TYPE 2 DIABETES MELLITUS WITH STAGE 3A CHRONIC KIDNEY DISEASE, WITHOUT LONG-TERM CURRENT USE OF INSULIN (HCC): Primary | ICD-10-CM

## 2022-10-31 DIAGNOSIS — I48.0 PAROXYSMAL ATRIAL FIBRILLATION (HCC): ICD-10-CM

## 2022-10-31 DIAGNOSIS — N18.31 TYPE 2 DIABETES MELLITUS WITH STAGE 3A CHRONIC KIDNEY DISEASE, WITHOUT LONG-TERM CURRENT USE OF INSULIN (HCC): Primary | ICD-10-CM

## 2022-10-31 DIAGNOSIS — E66.01 SEVERE OBESITY (BMI 35.0-39.9) WITH COMORBIDITY (HCC): ICD-10-CM

## 2022-10-31 DIAGNOSIS — I50.32 CHRONIC DIASTOLIC CHF (CONGESTIVE HEART FAILURE) (HCC): ICD-10-CM

## 2022-10-31 DIAGNOSIS — I10 ESSENTIAL HYPERTENSION: ICD-10-CM

## 2022-10-31 PROCEDURE — 3078F DIAST BP <80 MM HG: CPT | Performed by: INTERNAL MEDICINE

## 2022-10-31 PROCEDURE — 1036F TOBACCO NON-USER: CPT | Performed by: INTERNAL MEDICINE

## 2022-10-31 PROCEDURE — 3074F SYST BP LT 130 MM HG: CPT | Performed by: INTERNAL MEDICINE

## 2022-10-31 PROCEDURE — 3044F HG A1C LEVEL LT 7.0%: CPT | Performed by: INTERNAL MEDICINE

## 2022-10-31 PROCEDURE — 99214 OFFICE O/P EST MOD 30 MIN: CPT | Performed by: INTERNAL MEDICINE

## 2022-10-31 PROCEDURE — G8484 FLU IMMUNIZE NO ADMIN: HCPCS | Performed by: INTERNAL MEDICINE

## 2022-10-31 PROCEDURE — G8417 CALC BMI ABV UP PARAM F/U: HCPCS | Performed by: INTERNAL MEDICINE

## 2022-10-31 PROCEDURE — 1123F ACP DISCUSS/DSCN MKR DOCD: CPT | Performed by: INTERNAL MEDICINE

## 2022-10-31 PROCEDURE — G8427 DOCREV CUR MEDS BY ELIG CLIN: HCPCS | Performed by: INTERNAL MEDICINE

## 2022-10-31 ASSESSMENT — ENCOUNTER SYMPTOMS
BLOOD IN STOOL: 0
SORE THROAT: 0
ABDOMINAL PAIN: 0
SHORTNESS OF BREATH: 0
NAUSEA: 0
VOMITING: 0
COUGH: 0

## 2022-10-31 NOTE — PATIENT INSTRUCTIONS
Continue current medications  Take diclofenac 2 times daily with food x 2 weeks-has at home. Advised heels exercises to stretch has tendon in his foot  Call if not getting better  Patient Education        Plantar Fasciitis: Care Instructions  Your Care Instructions     Plantar fasciitis is pain and inflammation of the plantar fascia, the tissue at the bottom of your foot that connects the heel bone to the toes. The plantar fascia also supports the arch. If you strain the plantar fascia, it can develop small tears and cause heel pain when you stand or walk. Plantar fasciitis can be caused by running or other sports. It also may occur in people who are overweight or who have high arches or flat feet. You may get plantar fasciitis if you walk or stand for long periods, or have a tight Achilles tendon or calf muscles. You can improve your foot pain with rest and other care at home. It might take a few weeks to a few months for your foot to heal completely. Follow-up care is a key part of your treatment and safety. Be sure to make and go to all appointments, and call your doctor if you are having problems. It's also a good idea to know your test results and keep a list of the medicines you take. How can you care for yourself at home? · Rest your feet often. Reduce your activity to a level that lets you avoid pain. If possible, do not run or walk on hard surfaces. · Take pain medicines exactly as directed. ? If the doctor gave you a prescription medicine for pain, take it as prescribed. ? If you are not taking a prescription pain medicine, take an over-the-counter anti-inflammatory medicine for pain and swelling, such as ibuprofen (Advil, Motrin) or naproxen (Aleve). Read and follow all instructions on the label. · Use ice massage to help with pain and swelling. You can use an ice cube or an ice cup several times a day. To make an ice cup, fill a paper cup with water and freeze it.  Cut off the top of the cup HEMATOLOGY/ONCOLOGY PROGRESS NOTE      REASON FOR CONSULTATION/ CHIEF COMPLAINT  I am seeing Jael Alaniz at the request of Dr. 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 See my 10/18 note for a more detailed history.     She is here today with her son Jeffrey. He notes that she has been feeling a bit anxious today. She is having abdominal pain today.  The morphine helps, however her son feels that the medication is making her a bit more anxious. She is still in the rehab facility, however she has been more mobile and is able to walk with a walker.  They are tentatively planning on discharge the second week in November.    REVIEW OF SYSTEMS:    All systems reviewed and are negative with the except of the findings noted in the HPI.    HISTORIES:    Past Medical History:   Diagnosis Date   • Abdominal pain    • Blood clot associated with vein wall inflammation    • Diverticulitis of large intestine    • Essential (primary) hypertension    • Hearing loss    • Hypertension    • Joint pain    • Pancreatic cancer (CMS/HCC)    • Poor appetite    • Pulmonary embolism (CMS/HCC)    • Weight gain       Past Surgical History:   Procedure Laterality Date   • Back surgery     • Femur/knee surg unlisted      right femur fracture/ plate   • Hip surgery Right    • Knee surgery        (Not in a hospital admission)    ALLERGIES:  No Known Allergies   Social History     Tobacco Use   • Smoking status: Never Smoker   • Smokeless tobacco: Never Used   Substance Use Topics   • Alcohol use: Yes     Alcohol/week: 2.0 standard drinks     Types: 2 Standard drinks or equivalent per week     Comment: social      No family history on  file.  Medications and allergies- reviewed      OBJECTIVE:      Vital Signs:   Oncology Encounter Vitals [10/31/22 1008]   ONC OP Encounter Vitals Group      BP       Pulse       Resp 16      Temp       Temp src       SpO2       Weight 109 lb 12.8 oz (49.8 kg)      Height 4' 11.5\" (1.511 m)      Pain Score       Pain Location       Pain Education?       BSA (Calculated - m2) - Mimi Le 1.44      BSA (Calculated - sq m) 1.45      BMI (Calculated) 21.81     PHYSICAL EXAMINATION:    General: Cachectic, chronically ill-appearing, nontoxic appearing.  Skin:  Skin turgor normal.  No rash.  Mild jaundice.  Eyes:  Conjunctiva clear, scleral icterus.  HEENT: Supple. No thyromegaly.  No lymphadenopathy  Lungs:   Clear. No wheezing or rales.    Heart:  Regular rate and rhythm. S1 and S2 normal.   Abdomen:  Soft, non-tender, bowel sounds normal.    Extremities:   Normal, No edema.    Neurologic:  Alert and oriented x 3.  No focal deficit    Recent PHQ 2/9 Score    PHQ 2:  Date Adult PHQ 2 Score Adult PHQ 2 Interpretation   10/18/2022 0 No further screening needed       PHQ 9:       LABORATORY DATA:  Recent Labs   Lab 10/18/22  0957   WBC 8.7   RBC 4.09   HGB 13.8   HCT 41.6   .7*      Absolute Neutrophils 5.4   Absolute Lymphocytes 2.4   Absolute Monocytes 0.7   Absolute Eosinophils  0.1   Absolute Basophils 0.0     Recent Labs   Lab 10/18/22  0957 08/31/22  0500 08/30/22  1816   Glucose 95   < > 100*   Sodium 143   < > 141   Potassium 4.2   < > 3.9   Chloride 111*   < > 109   Carbon Dioxide 24   < > 27   BUN 21*   < > 20   Creatinine 1.41*   < > 1.06*   Calcium 8.9   < > 9.5   Magnesium  --   --  2.3   Protein, Total 7.2   < > 7.6   Albumin 2.4*   < > 2.8*   GOT/AST 74*   < > 1,120*   Alkaline Phosphatase 313*   < > 901*   GPT 47   < > 635*    < > = values in this interval not displayed.     Recent Labs   Lab 10/18/22  0957 08/31/22  0500 08/30/22  1816   Anion Gap 12   < > 9   Globulin 4.8*   < > 4.8*  until a half-inch of ice shows. Hold onto the remaining paper to use the cup. Rub the ice in small circles over the area for 5 to 7 minutes. · Contrast baths, which alternate hot and cold water, can also help reduce swelling. But because heat alone may make pain and swelling worse, end a contrast bath with a soak in cold water. · Wear a night splint if your doctor suggests it. A night splint holds your foot with the toes pointed up and the foot and ankle at a 90-degree angle. This position gives the bottom of your foot a constant, gentle stretch. · Do simple exercises such as calf stretches and towel stretches 2 to 3 times each day, especially when you first get up in the morning. These can help the plantar fascia become more flexible. They also make the muscles that support your arch stronger. Hold these stretches for 15 to 30 seconds per stretch. Repeat 2 to 4 times. ? Stand about 1 foot from a wall. Place the palms of both hands against the wall at chest level. Lean forward against the wall, keeping one leg with the knee straight and heel on the ground while bending the knee of the other leg.  ? Sit down on the floor or a mat with your feet stretched in front of you. Roll up a towel lengthwise, and loop it over the ball of your foot. Holding the towel at both ends, gently pull the towel toward you to stretch your foot. · Wear shoes with good arch support. Athletic shoes or shoes with a well-cushioned sole are good choices. · Try heel cups or shoe inserts (orthotics) to help cushion your heel. You can buy these at many shoe stores. · Put on your shoes as soon as you get out of bed. Going barefoot or wearing slippers may make your pain worse. · Reach and stay at a good weight for your height. This puts less strain on your feet. When should you call for help? Call your doctor now or seek immediate medical care if:  · You have heel pain with fever, redness, or warmth in your heel.   · You cannot put weight   LD, Total  --   --  424*   Bilirubin, Total 6.0*   < > 4.0*    < > = values in this interval not displayed.         IMAGING STUDIES:    Imaging studies reviewed. The pertinent positives are     CT CHEST 10/24 IMPRESSION:     Multiple new pulmonary nodules/nodular opacities, nonspecific, but suspicious for metastatic foci in patient with known pancreatic cancer.        MRI abd 8/31/22.  IMPRESSION:  1.   Very limited exam.  2.   Diffuse intrahepatic and extrahepatic biliary ductal dilatation  reidentified appearing slightly worsened.  No definite choledocholithiasis.  3.   No large pancreatic mass, however subtle lesion is not excluded on  this limited exam.  No pancreatic ductal dilatation or focal glandular  atrophy to suggest large pancreatic head mass.  4.   Large hiatal hernia.  5.   Hepatic steatosis with possible hemangioma or other vascular shunting.   No definite suspicious hepatic lesions.    CT AP 8/27/22.  IMPRESSION:  1. No evidence of acute inflammation or abnormal fluid collection.   2. Stable findings of prior cholecystectomy with intra and extrahepatic  biliary dilatation.  3.  Stable moderate hiatal hernia.  4.  No other acute abnormalities.      US liver/gallbladder/pancreas 8/30/22.  IMPRESSION:  1.   Heterogeneous liver with suggestion of small hypoechoic lesions which  is not apparent on the recent CT likely an artifactual finding.  Suggests  interval follow-up CT 4-6 months to confirm stability.  2.   Status post cholecystectomy with dilated common and intrahepatic bile  ducts.     PATHOLOGY  FNA 9/30/22.  Head of pancreas; ThinPrep and cell block:   -Positive for malignancy.  -Poorly differentiated adenocarcinoma.  See comment.      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    We discussed treatment options. I  on the sore foot. Watch closely for changes in your health, and be sure to contact your doctor if:  · You have numbness or tingling in your heel. · Your heel pain lasts more than 2 weeks. Where can you learn more? Go to https://leandro.Variable. org and sign in to your [employer] account. Enter Y679 in the [employer] box to learn more about \"Plantar Fasciitis: Care Instructions. \"     If you do not have an account, please click on the \"Sign Up Now\" link. Current as of: March 2, 2020               Content Version: 12.5  © 2076-4526 [employer]. Care instructions adapted under license by [facility] ([facility]). If you have questions about a medical condition or this instruction, always ask your healthcare professional. [address] any warranty or liability for your use of this information. Patient Education        Plantar Fasciitis: Exercises  Introduction  Here are some examples of exercises for you to try. The exercises may be suggested for a condition or for rehabilitation. Start each exercise slowly. Ease off the exercises if you start to have pain. You will be told when to start these exercises and which ones will work best for you. How to do the exercises  Towel stretch   1. Sit with your legs extended and knees straight. 2. Place a towel around your foot just under the toes. 3. Hold each end of the towel in each hand, with your hands above your knees. 4. Pull back with the towel so that your foot stretches toward you. 5. Hold the position for at least 15 to 30 seconds. 6. Repeat 2 to 4 times a session, up to 5 sessions a day. Calf stretch   This exercise stretches the muscles at the back of the lower leg (the calf) and the Achilles tendon. Do this exercise 3 or 4 times a day, 5 days a week. 1. Stand facing a wall with your hands on the wall at about eye level. Put the leg you want to stretch about a step behind your other leg.   2. Keeping emphasized that this cancer will be life-limiting.  While the tumor may be technically resectable, given her age, comorbidities, and functional status I recommended against curative intent treatment with FOLFORINOX and a Whipple procedure.  The treatment would near certainly be prohibitively toxic and potentially fatal.   Palliative options include radiation or single-agent chemotherapy with gemcitabine, likely given at 800 or 1000 mg/m² every other week presuming she has improvement in her biliary obstruction.     Pancreatic cancer  - CT chest 10/24 showed multiple nonspecific sub-centimeter pulmonary nodules suspicious for metastatic foci. We discussed palliative chemotherapy with gemcitabine and how it is often not administered to patients with biliary obstruction.  Of note, there are multiple pharmacologic analyses and observational data sets with gemcitabine given to patients with bilirubinemia without an increased risk of adverse effects (see [id number], [id number], [id number]).  I would be willing to try it in her case.  - We discussed referral to palliative care and/or home hospice once she leaves the rehab facility.  - Patient and her son met with radiation oncologist Dr. Blanca. He recommended a repeat CT chest in 4-5 weeks and re-evaluation thereafter.    Back & epigastric pain  - Patient was unable to take tramadol during her 10/18 visit. Gave her 2 mg IV morphine in the clinic with good relief.  - Her son notes that the morphine causes some confusion. We discussed that palliative care would be helpful in this regard.  I would be willing to prescribe oxycodone to see if there is an improvement.    I will see her 4-5 weeks after her chest CT.    I spent 35 minutes on today's visit including >50% of time in face-to-face communication and coordination of care.     On 10/31/2022, IMikey scribed the services personally performed by Dr. Koch.    The documentation recorded by the scribe accurately  your back heel on the floor, bend your front knee until you feel a stretch in the back leg. 3. Hold the stretch for 15 to 30 seconds. Repeat 2 to 4 times. Plantar fascia and calf stretch   Stretching the plantar fascia and calf muscles can increase flexibility and decrease heel pain. You can do this exercise several times each day and before and after activity. 1. Stand on a step as shown above. Be sure to hold on to the banister. 2. Slowly let your heels down over the edge of the step as you relax your calf muscles. You should feel a gentle stretch across the bottom of your foot and up the back of your leg to your knee. 3. Hold the stretch about 15 to 30 seconds, and then tighten your calf muscle a little to bring your heel back up to the level of the step. Repeat 2 to 4 times. Towel curls   Make this exercise more challenging by placing a weighted object, such as a soup can, on the other end of the towel. 1. While sitting, place your foot on a towel on the floor and scrunch the towel toward you with your toes. 2. Then, also using your toes, push the towel away from you. [place] pickups   1. Put marbles on the floor next to a cup.  2. Using your toes, try to lift the marbles up from the floor and put them in the cup. Follow-up care is a key part of your treatment and safety. Be sure to make and go to all appointments, and call your doctor if you are having problems. It's also a good idea to know your test results and keep a list of the medicines you take. Where can you learn more? Go to https://LAN[employer].WIDIP. org and sign in to your [employer] account. Enter Y887 in the [facility] box to learn more about \"Plantar Fasciitis: Exercises. \"     If you do not have an account, please click on the \"Sign Up Now\" link. Current as of: March 2, 2020               Content Version: 12.5  © 0205-7949 [employer]. Care instructions adapted under license by [facility] ([facility]). If you have questions about a medical condition or this instruction, always ask your healthcare professional. [place] [address] any warranty or liability for your use of this information. and completely reflects the service(s) I personally performed and the decisions made by me.     Conor Koch MD, PhD  Hematology/Oncology

## 2022-10-31 NOTE — PROGRESS NOTES
Yandel Pickering (:  1940) is a 80 y.o. male, Established patient, here for evaluation of the following chief complaint(s):  Hypertension and Hyperlipidemia (4 week follow up)         ASSESSMENT/PLAN:  1. 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 Essential hypertension  - Stable   - Continue current dose of carvedilol, furosemide and Aldactone    3. Severe obesity (BMI 35.0-39. 9) with comorbidity ([address]  - Stable   - Continue lifestyle modifications with regular exercise and diabetic diet. 4. Mixed hyperlipidemia  - Stable   - Continue current dose of Atorvastatin    5. Chronic diastolic CHF (congestive heart failure) (HCC)  - Stable   -Follows with cardiology  - Continue current dose of carvedilol, Aldactone, furosemide, Jardiance. Medications managed by his cardiologist    6. Paroxysmal atrial fibrillation (HCC)  - Stable   Continue current dose of carvedilol, amiodarone and Eliquis  Follows with cardiology. Medications managed by his cardiologist    7. Thrombocytopenia, unspecified ([facility]  Stable. Continue monitoring blood count. Patient follows with cardiology regularly for the management of chronic congestive heart failure and paroxysmal atrial fibrillation. Return in about 3 months (around 2023). Subjective   SUBJECTIVE/OBJECTIVE:  Hypertension  This is a chronic problem. The current episode started more than 1 year ago. The problem is controlled. Pertinent negatives include no chest pain, palpitations or shortness of breath. Risk factors for coronary artery disease include dyslipidemia and male gender. Past treatments include diuretics and beta blockers. The current treatment provides significant improvement. There are no compliance problems. Hyperlipidemia  This is a chronic problem. The current episode started more than 1 year ago.  The problem is controlled. Pertinent negatives include no chest pain or shortness of breath. Current antihyperlipidemic treatment includes statins. The current treatment provides significant improvement of lipids. There are no compliance problems. Risk factors for coronary artery disease include dyslipidemia, hypertension and male sex. Diabetes  He presents for his follow-up diabetic visit. He has type 2 diabetes mellitus. His disease course has been stable. Pertinent negatives for hypoglycemia include no dizziness. Pertinent negatives for diabetes include no chest pain, no fatigue and no weakness. Diabetic complications include heart disease and nephropathy. Risk factors for coronary artery disease include diabetes mellitus, hypertension and male sex. Current diabetic treatment includes oral agent (monotherapy). He is compliant with treatment all of the time. He is following a diabetic diet. He participates in exercise intermittently. An ACE inhibitor/angiotensin II receptor blocker is not being taken. Review of Systems   Constitutional:  Negative for fatigue and fever. HENT:  Negative for nosebleeds and sore throat. Respiratory:  Negative for cough and shortness of breath. Cardiovascular:  Negative for chest pain, palpitations and leg swelling. Gastrointestinal:  Negative for abdominal pain, blood in stool, nausea and vomiting. Neurological:  Negative for dizziness and weakness. Objective   Physical Exam  Constitutional:       Appearance: Normal appearance. HENT:      Head: Normocephalic and atraumatic. Eyes:      General: No scleral icterus. Conjunctiva/sclera: Conjunctivae normal.   Cardiovascular:      Rate and Rhythm: Normal rate and regular rhythm. Pulses: Normal pulses. Heart sounds: Normal heart sounds. Pulmonary:      Effort: Pulmonary effort is normal.      Breath sounds: Normal breath sounds. Musculoskeletal:         General: No swelling.    Skin:     General: Skin is warm and dry. Neurological:      Mental Status: He is alert and oriented to person, place, and time. Mental status is at baseline. Psychiatric:         Mood and Affect: Mood normal.         Behavior: Behavior normal.              An electronic signature was used to authenticate this note.     --Ny Dutta MD

## 2022-12-04 DIAGNOSIS — N18.31 TYPE 2 DIABETES MELLITUS WITH STAGE 3A CHRONIC KIDNEY DISEASE, WITHOUT LONG-TERM CURRENT USE OF INSULIN (HCC): ICD-10-CM

## 2022-12-04 DIAGNOSIS — E11.22 TYPE 2 DIABETES MELLITUS WITH STAGE 3A CHRONIC KIDNEY DISEASE, WITHOUT LONG-TERM CURRENT USE OF INSULIN (HCC): ICD-10-CM

## 2022-12-05 NOTE — TELEPHONE ENCOUNTER
Last ov  10 31 22  Future Appointments   Date Time Provider Jose Figueredo   1/30/2023  8:15 AM Nesha France MD [facility]

## 2023-01-11 ENCOUNTER — TELEPHONE (OUTPATIENT)
Dept: INTERNAL MEDICINE CLINIC | Age: 83
End: 2023-01-11

## 2023-01-11 NOTE — TELEPHONE ENCOUNTER
That is late based on his symptoms. Patient may have a stroke. Please advise him to go to emergency today.

## 2023-01-11 NOTE — TELEPHONE ENCOUNTER
Please check if patient can see any provider in our office today. Otherwise they can take him to emergency for urgent evaluation and treatment.

## 2023-01-11 NOTE — TELEPHONE ENCOUNTER
Nurse Triage- pt wife called in stating pt is weak and dizzy and wants to know if pt can come in to get blood sugar checked

## 2023-01-12 ENCOUNTER — OFFICE VISIT (OUTPATIENT)
Dept: INTERNAL MEDICINE CLINIC | Age: 83
End: 2023-01-12

## 2023-01-12 VITALS
HEART RATE: 48 BPM | OXYGEN SATURATION: 95 % | SYSTOLIC BLOOD PRESSURE: 122 MMHG | BODY MASS INDEX: 35.36 KG/M2 | RESPIRATION RATE: 14 BRPM | TEMPERATURE: 97.8 F | WEIGHT: 220 LBS | HEIGHT: 66 IN | DIASTOLIC BLOOD PRESSURE: 58 MMHG

## 2023-01-12 DIAGNOSIS — R53.83 FATIGUE, UNSPECIFIED TYPE: ICD-10-CM

## 2023-01-12 DIAGNOSIS — E11.22 TYPE 2 DIABETES MELLITUS WITH STAGE 3A CHRONIC KIDNEY DISEASE, WITHOUT LONG-TERM CURRENT USE OF INSULIN (HCC): Primary | ICD-10-CM

## 2023-01-12 DIAGNOSIS — R42 LIGHTHEADEDNESS: ICD-10-CM

## 2023-01-12 DIAGNOSIS — N18.31 TYPE 2 DIABETES MELLITUS WITH STAGE 3A CHRONIC KIDNEY DISEASE, WITHOUT LONG-TERM CURRENT USE OF INSULIN (HCC): Primary | ICD-10-CM

## 2023-01-12 LAB
A/G RATIO: 1.9 (ref 1.1–2.2)
ALBUMIN SERPL-MCNC: 4.1 G/DL (ref 3.4–5)
ALP BLD-CCNC: 63 U/L (ref 40–129)
ALT SERPL-CCNC: 22 U/L (ref 10–40)
ANION GAP SERPL CALCULATED.3IONS-SCNC: 12 MMOL/L (ref 3–16)
AST SERPL-CCNC: 17 U/L (ref 15–37)
BASOPHILS ABSOLUTE: 0 K/UL (ref 0–0.2)
BASOPHILS RELATIVE PERCENT: 0.4 %
BILIRUB SERPL-MCNC: 0.4 MG/DL (ref 0–1)
BUN BLDV-MCNC: 30 MG/DL (ref 7–20)
CALCIUM SERPL-MCNC: 9.1 MG/DL (ref 8.3–10.6)
CHLORIDE BLD-SCNC: 102 MMOL/L (ref 99–110)
CO2: 25 MMOL/L (ref 21–32)
CREAT SERPL-MCNC: 1.3 MG/DL (ref 0.8–1.3)
EOSINOPHILS ABSOLUTE: 0.1 K/UL (ref 0–0.6)
EOSINOPHILS RELATIVE PERCENT: 1.8 %
GFR SERPL CREATININE-BSD FRML MDRD: 56 ML/MIN/{1.73_M2}
GLUCOSE BLD-MCNC: 127 MG/DL (ref 70–99)
HCT VFR BLD CALC: 41.2 % (ref 40.5–52.5)
HEMOGLOBIN: 13.8 G/DL (ref 13.5–17.5)
LYMPHOCYTES ABSOLUTE: 1.5 K/UL (ref 1–5.1)
LYMPHOCYTES RELATIVE PERCENT: 21.6 %
MAGNESIUM: 2 MG/DL (ref 1.8–2.4)
MCH RBC QN AUTO: 31.3 PG (ref 26–34)
MCHC RBC AUTO-ENTMCNC: 33.5 G/DL (ref 31–36)
MCV RBC AUTO: 93.6 FL (ref 80–100)
MONOCYTES ABSOLUTE: 0.8 K/UL (ref 0–1.3)
MONOCYTES RELATIVE PERCENT: 11.1 %
NEUTROPHILS ABSOLUTE: 4.6 K/UL (ref 1.7–7.7)
NEUTROPHILS RELATIVE PERCENT: 65.1 %
PDW BLD-RTO: 13.6 % (ref 12.4–15.4)
PLATELET # BLD: 147 K/UL (ref 135–450)
PMV BLD AUTO: 9.8 FL (ref 5–10.5)
POTASSIUM SERPL-SCNC: 4.2 MMOL/L (ref 3.5–5.1)
RBC # BLD: 4.4 M/UL (ref 4.2–5.9)
SODIUM BLD-SCNC: 139 MMOL/L (ref 136–145)
T4 FREE: 1.3 NG/DL (ref 0.9–1.8)
TOTAL PROTEIN: 6.3 G/DL (ref 6.4–8.2)
TSH REFLEX: 4.81 UIU/ML (ref 0.27–4.2)
WBC # BLD: 7.1 K/UL (ref 4–11)

## 2023-01-12 RX ORDER — GLUCOSAMINE HCL/CHONDROITIN SU 500-400 MG
CAPSULE ORAL
Qty: 100 STRIP | Refills: 1 | Status: SHIPPED | OUTPATIENT
Start: 2023-01-12

## 2023-01-12 RX ORDER — LANCETS 30 GAUGE
1 EACH MISCELLANEOUS DAILY
Qty: 100 EACH | Refills: 1 | Status: SHIPPED | OUTPATIENT
Start: 2023-01-12

## 2023-01-12 ASSESSMENT — PATIENT HEALTH QUESTIONNAIRE - PHQ9
1. LITTLE INTEREST OR PLEASURE IN DOING THINGS: 0
SUM OF ALL RESPONSES TO PHQ QUESTIONS 1-9: 0
SUM OF ALL RESPONSES TO PHQ QUESTIONS 1-9: 0
2. FEELING DOWN, DEPRESSED OR HOPELESS: 0
SUM OF ALL RESPONSES TO PHQ QUESTIONS 1-9: 0
SUM OF ALL RESPONSES TO PHQ9 QUESTIONS 1 & 2: 0
SUM OF ALL RESPONSES TO PHQ QUESTIONS 1-9: 0

## 2023-01-13 LAB
ESTIMATED AVERAGE GLUCOSE: 122.6 MG/DL
HBA1C MFR BLD: 5.9 %

## 2023-01-21 ASSESSMENT — ENCOUNTER SYMPTOMS
COUGH: 0
BLOOD IN STOOL: 0
SORE THROAT: 0
VOMITING: 0
SHORTNESS OF BREATH: 0
ABDOMINAL PAIN: 0
NAUSEA: 0

## 2023-01-21 NOTE — PROGRESS NOTES
Veronica Omalley (:  1940) is a [de-identified] y.o. male, Established patient, here for evaluation of the following chief complaint(s):  Dizziness and Fatigue         ASSESSMENT/PLAN:  1. Type 2 diabetes mellitus with stage 3a chronic kidney disease, without long-term current use of insulin (HCC)  - Stable   - Continue current dose of metformin and Jardiance   -     blood glucose monitor kit and supplies; Dispense sufficient amount for indicated testing frequency plus additional to accommodate PRN testing needs. Dispense all needed supplies to include: monitor, strips, lancing device, lancets, control solutions, alcohol swabs., Disp-1 kit, R-0, Normal  -     Lancets MISC; DAILY Starting Thu 2023, Disp-100 each, R-1, Normal  -     blood glucose monitor strips; Test 1 times a day & as needed for symptoms of irregular blood glucose. Dispense sufficient amount for indicated testing frequency plus additional to accommodate PRN testing needs. , Disp-100 strip, R-1, Normal  -     Hemoglobin A1C  2. Fatigue, unspecified type  Probably due to dehydration  Advised patient to increase water intake and skip furosemide for 1 to 2 days and restart once fatigue and lightheadedness improves  Ordered additional workup as mentioned below  -     Comprehensive Metabolic Panel  -     TSH with Reflex  -     CBC with Auto Differential  -     Magnesium  3. Lightheadedness  Probably due to dehydration  Advised patient to increase water intake and skip furosemide for 1 to 2 days and restart once fatigue and lightheadedness improves  Ordered additional workup as mentioned below  -     Comprehensive Metabolic Panel  -     TSH with Reflex  -     CBC with Auto Differential  -     Magnesium    Return if symptoms worsen or fail to improve. Subjective   SUBJECTIVE/OBJECTIVE:  Dizziness  Associated symptoms include fatigue.  Pertinent negatives include no abdominal pain, chest pain, coughing, fever, nausea, sore throat, vomiting or weakness. Fatigue  Associated symptoms include fatigue. Pertinent negatives include no abdominal pain, chest pain, coughing, fever, nausea, sore throat, vomiting or weakness. Review of Systems   Constitutional:  Positive for fatigue. Negative for fever. HENT:  Negative for nosebleeds and sore throat. Respiratory:  Negative for cough and shortness of breath. Cardiovascular:  Negative for chest pain, palpitations and leg swelling. Gastrointestinal:  Negative for abdominal pain, blood in stool, nausea and vomiting. Neurological:  Positive for dizziness. Negative for weakness. Objective   Physical Exam  Constitutional:       Appearance: Normal appearance. HENT:      Head: Normocephalic and atraumatic. Mouth/Throat:      Mouth: Mucous membranes are dry. Pharynx: Oropharynx is clear. Eyes:      General: No scleral icterus. Right eye: No discharge. Left eye: No discharge. Extraocular Movements: Extraocular movements intact. Conjunctiva/sclera: Conjunctivae normal.      Pupils: Pupils are equal, round, and reactive to light. Cardiovascular:      Rate and Rhythm: Normal rate and regular rhythm. Pulses: Normal pulses. Heart sounds: Normal heart sounds. Pulmonary:      Effort: Pulmonary effort is normal. No respiratory distress. Breath sounds: Normal breath sounds. No wheezing. Abdominal:      General: Abdomen is flat. Bowel sounds are normal. There is no distension. Palpations: Abdomen is soft. Tenderness: There is no abdominal tenderness. There is no guarding. Musculoskeletal:         General: No swelling. Normal range of motion. Cervical back: Normal range of motion. Right lower leg: No edema. Left lower leg: No edema. Skin:     General: Skin is warm and dry. Neurological:      General: No focal deficit present. Mental Status: He is alert and oriented to person, place, and time.  Mental status is at baseline. Cranial Nerves: No cranial nerve deficit. Sensory: No sensory deficit. Motor: No weakness. Coordination: Coordination normal.      Gait: Gait normal.   Psychiatric:         Mood and Affect: Mood normal.         Behavior: Behavior normal.        During this visit I have spent 40 minutes reviewing previous notes, test results, medications and to perform face to face encounter with the patient obtaining history, performing physical exam and discussing the diagnosis, lab results, medications and importance of compliance with the treatment plan as well as to complete documentation in electronic health records. An electronic signature was used to authenticate this note.     --Savi Grider MD

## 2023-01-31 ENCOUNTER — OFFICE VISIT (OUTPATIENT)
Dept: INTERNAL MEDICINE CLINIC | Age: 83
End: 2023-01-31
Payer: MEDICARE

## 2023-01-31 VITALS
BODY MASS INDEX: 36.48 KG/M2 | HEART RATE: 68 BPM | OXYGEN SATURATION: 98 % | DIASTOLIC BLOOD PRESSURE: 84 MMHG | WEIGHT: 226 LBS | SYSTOLIC BLOOD PRESSURE: 131 MMHG | TEMPERATURE: 98 F

## 2023-01-31 DIAGNOSIS — D69.6 THROMBOCYTOPENIA, UNSPECIFIED (HCC): ICD-10-CM

## 2023-01-31 DIAGNOSIS — I48.0 PAROXYSMAL ATRIAL FIBRILLATION (HCC): ICD-10-CM

## 2023-01-31 DIAGNOSIS — I10 ESSENTIAL HYPERTENSION: ICD-10-CM

## 2023-01-31 DIAGNOSIS — I50.32 CHRONIC DIASTOLIC CHF (CONGESTIVE HEART FAILURE) (HCC): ICD-10-CM

## 2023-01-31 DIAGNOSIS — N18.31 TYPE 2 DIABETES MELLITUS WITH STAGE 3A CHRONIC KIDNEY DISEASE, WITHOUT LONG-TERM CURRENT USE OF INSULIN (HCC): Primary | ICD-10-CM

## 2023-01-31 DIAGNOSIS — E78.2 MIXED HYPERLIPIDEMIA: ICD-10-CM

## 2023-01-31 DIAGNOSIS — E11.22 TYPE 2 DIABETES MELLITUS WITH STAGE 3A CHRONIC KIDNEY DISEASE, WITHOUT LONG-TERM CURRENT USE OF INSULIN (HCC): Primary | ICD-10-CM

## 2023-01-31 DIAGNOSIS — E66.01 SEVERE OBESITY (BMI 35.0-39.9) WITH COMORBIDITY (HCC): ICD-10-CM

## 2023-01-31 DIAGNOSIS — R20.8 DECREASED PERIPHERAL VIBRATORY SENSE: ICD-10-CM

## 2023-01-31 PROCEDURE — 3044F HG A1C LEVEL LT 7.0%: CPT | Performed by: INTERNAL MEDICINE

## 2023-01-31 PROCEDURE — 99214 OFFICE O/P EST MOD 30 MIN: CPT | Performed by: INTERNAL MEDICINE

## 2023-01-31 PROCEDURE — 3079F DIAST BP 80-89 MM HG: CPT | Performed by: INTERNAL MEDICINE

## 2023-01-31 PROCEDURE — G8484 FLU IMMUNIZE NO ADMIN: HCPCS | Performed by: INTERNAL MEDICINE

## 2023-01-31 PROCEDURE — G8417 CALC BMI ABV UP PARAM F/U: HCPCS | Performed by: INTERNAL MEDICINE

## 2023-01-31 PROCEDURE — 1036F TOBACCO NON-USER: CPT | Performed by: INTERNAL MEDICINE

## 2023-01-31 PROCEDURE — G8427 DOCREV CUR MEDS BY ELIG CLIN: HCPCS | Performed by: INTERNAL MEDICINE

## 2023-01-31 PROCEDURE — 1123F ACP DISCUSS/DSCN MKR DOCD: CPT | Performed by: INTERNAL MEDICINE

## 2023-01-31 PROCEDURE — 3075F SYST BP GE 130 - 139MM HG: CPT | Performed by: INTERNAL MEDICINE

## 2023-01-31 ASSESSMENT — ENCOUNTER SYMPTOMS
VOMITING: 0
BLOOD IN STOOL: 0
COUGH: 0
ABDOMINAL PAIN: 0
NAUSEA: 0
SHORTNESS OF BREATH: 0
SORE THROAT: 0

## 2023-01-31 NOTE — PROGRESS NOTES
Kayli Gerardo (:  1940) is a [de-identified] y.o. male, Established patient, here for evaluation of the following chief complaint(s):  3 Month Follow-Up and Diabetes         ASSESSMENT/PLAN:  1. Type 2 diabetes mellitus with stage 3a chronic kidney disease, without long-term current use of insulin (HCC)  - Stable   - Continue current dose of metformin and Jardiance   -      DIABETES FOOT EXAM    2. Essential hypertension  - Stable   - Continue current dose of carvedilol, furosemide and Aldactone    3. Severe obesity (BMI 35.0-39. 9) with comorbidity ([facility]  - Stable   - Continue lifestyle modifications with regular exercise and diabetic diet. 4. Mixed hyperlipidemia  - Stable   - Continue current dose of Atorvastatin    5. Chronic diastolic CHF (congestive heart failure) (HCC)  - Stable   -Follows with cardiology  - Continue current dose of carvedilol, Aldactone, furosemide, Jardiance. Medications managed by his cardiologist    6. Paroxysmal atrial fibrillation (HCC)  - Stable   Continue current dose of carvedilol, amiodarone and Eliquis  Follows with cardiology. Medications managed by his cardiologist    7. Thrombocytopenia, unspecified ([facility]  Stable. Continue monitoring blood count. 8. Decreased peripheral vibratory sense  -     Vitamin B12 & Folate; Future    Patient follows with cardiology regularly for the management of chronic congestive heart failure and paroxysmal atrial fibrillation. Return in about 3 months (around 2023). Subjective   SUBJECTIVE/OBJECTIVE:  Hypertension  This is a chronic problem. The current episode started more than 1 year ago. The problem is controlled. Pertinent negatives include no chest pain, palpitations or shortness of breath. Risk factors for coronary artery disease include dyslipidemia and male gender. Past treatments include diuretics and beta blockers. The current treatment provides significant improvement. There are no compliance problems. Hyperlipidemia  This is a chronic problem. The current episode started more than 1 year ago. The problem is controlled. Pertinent negatives include no chest pain or shortness of breath. Current antihyperlipidemic treatment includes statins. The current treatment provides significant improvement of lipids. There are no compliance problems. Risk factors for coronary artery disease include dyslipidemia, hypertension and male sex. Diabetes  He presents for his follow-up diabetic visit. He has type 2 diabetes mellitus. His disease course has been stable. Pertinent negatives for hypoglycemia include no dizziness. Pertinent negatives for diabetes include no chest pain, no fatigue and no weakness. Diabetic complications include heart disease and nephropathy. Risk factors for coronary artery disease include diabetes mellitus, hypertension and male sex. Current diabetic treatment includes oral agent (monotherapy). He is compliant with treatment all of the time. He is following a diabetic diet. He participates in exercise intermittently. An ACE inhibitor/angiotensin II receptor blocker is not being taken. Review of Systems   Constitutional:  Negative for fatigue and fever. HENT:  Negative for nosebleeds and sore throat. Respiratory:  Negative for cough and shortness of breath. Cardiovascular:  Negative for chest pain, palpitations and leg swelling. Gastrointestinal:  Negative for abdominal pain, blood in stool, nausea and vomiting. Neurological:  Negative for dizziness and weakness. Objective   Physical Exam  Constitutional:       Appearance: Normal appearance. HENT:      Head: Normocephalic and atraumatic. Eyes:      General: No scleral icterus. Conjunctiva/sclera: Conjunctivae normal.   Cardiovascular:      Rate and Rhythm: Normal rate and regular rhythm. Pulses: Normal pulses. Heart sounds: Normal heart sounds.    Pulmonary:      Effort: Pulmonary effort is normal.      Breath sounds: Normal breath sounds. Musculoskeletal:         General: No swelling. Right lower leg: No edema. Left lower leg: No edema. Feet:      Comments: Visual inspection:  Deformity/amputation: absent  Skin lesions/pre-ulcerative calluses: absent  Edema: right- negative, left- negative    Sensory exam:  Monofilament sensation: normal  (minimum of 5 random plantar locations tested, avoiding callused areas - > 1 area with absence of sensation is + for neuropathy)    Plus at least one of the following:  Pulses: normal,   Proprioception: Intact  Vibration (128 Hz): Impaired - bilateral    Skin:     General: Skin is warm and dry. Neurological:      Mental Status: He is alert and oriented to person, place, and time. Mental status is at baseline. Psychiatric:         Mood and Affect: Mood normal.         Behavior: Behavior normal.              An electronic signature was used to authenticate this note.     --Ny Dutta MD

## 2023-03-03 DIAGNOSIS — E11.22 TYPE 2 DIABETES MELLITUS WITH STAGE 3A CHRONIC KIDNEY DISEASE, WITHOUT LONG-TERM CURRENT USE OF INSULIN (HCC): ICD-10-CM

## 2023-03-03 DIAGNOSIS — N18.31 TYPE 2 DIABETES MELLITUS WITH STAGE 3A CHRONIC KIDNEY DISEASE, WITHOUT LONG-TERM CURRENT USE OF INSULIN (HCC): ICD-10-CM

## 2023-03-09 DIAGNOSIS — N18.31 TYPE 2 DIABETES MELLITUS WITH STAGE 3A CHRONIC KIDNEY DISEASE, WITHOUT LONG-TERM CURRENT USE OF INSULIN (HCC): ICD-10-CM

## 2023-03-09 DIAGNOSIS — E11.22 TYPE 2 DIABETES MELLITUS WITH STAGE 3A CHRONIC KIDNEY DISEASE, WITHOUT LONG-TERM CURRENT USE OF INSULIN (HCC): ICD-10-CM

## 2023-03-09 NOTE — TELEPHONE ENCOUNTER
----- Message from Thania Ramirez sent at 3/8/2023  9:15 AM EST -----  Subject: Refill Request    QUESTIONS  Name of Medication? metFORMIN (GLUCOPHAGE) 500 MG tablet  Patient-reported dosage and instructions? 1 per day  How many days do you have left? 2  Preferred Pharmacy? [id number]  Pharmacy phone number (if available)? [phone]  ---------------------------------------------------------------------------  --------------  Omelia Counter INFO  What is the best way for the office to contact you? OK to leave message on   voicemail  Preferred Call Back Phone Number? [phone]  ---------------------------------------------------------------------------  --------------  SCRIPT ANSWERS  Relationship to Patient?  Self

## 2023-04-01 ENCOUNTER — HOSPITAL ENCOUNTER (INPATIENT)
Age: 83
LOS: 3 days | Discharge: HOME | DRG: 291 | End: 2023-04-04
Attending: EMERGENCY MEDICINE | Admitting: INTERNAL MEDICINE
Payer: MEDICARE

## 2023-04-01 ENCOUNTER — APPOINTMENT (OUTPATIENT)
Dept: GENERAL RADIOLOGY | Age: 83
DRG: 291 | End: 2023-04-01
Payer: MEDICARE

## 2023-04-01 DIAGNOSIS — I50.9 ACUTE ON CHRONIC CONGESTIVE HEART FAILURE, UNSPECIFIED HEART FAILURE TYPE (HCC): Primary | ICD-10-CM

## 2023-04-01 DIAGNOSIS — R06.03 ACUTE RESPIRATORY DISTRESS: ICD-10-CM

## 2023-04-01 PROBLEM — I48.91 ATRIAL FIBRILLATION (HCC): Status: ACTIVE | Noted: 2023-04-01

## 2023-04-01 PROBLEM — I50.23 ACUTE ON CHRONIC SYSTOLIC (CONGESTIVE) HEART FAILURE (HCC): Status: ACTIVE | Noted: 2022-09-18

## 2023-04-01 PROBLEM — I50.33 ACUTE ON CHRONIC DIASTOLIC (CONGESTIVE) HEART FAILURE (HCC): Status: ACTIVE | Noted: 2023-04-01

## 2023-04-01 LAB
ANION GAP SERPL CALCULATED.3IONS-SCNC: 15 MMOL/L (ref 3–16)
BASE EXCESS BLDV CALC-SCNC: -3.4 MMOL/L
BASE EXCESS BLDV CALC-SCNC: 0.8 MMOL/L
BASOPHILS # BLD: 0.1 K/UL (ref 0–0.2)
BASOPHILS NFR BLD: 0.5 %
BUN SERPL-MCNC: 29 MG/DL (ref 7–20)
CALCIUM SERPL-MCNC: 8.6 MG/DL (ref 8.3–10.6)
CHLORIDE SERPL-SCNC: 100 MMOL/L (ref 99–110)
CO2 BLDV-SCNC: 28 MMOL/L
CO2 BLDV-SCNC: 31 MMOL/L
CO2 SERPL-SCNC: 22 MMOL/L (ref 21–32)
COHGB MFR BLDV: 0.5 %
COHGB MFR BLDV: 1 %
CREAT SERPL-MCNC: 1.6 MG/DL (ref 0.8–1.3)
DEPRECATED RDW RBC AUTO: 14.3 % (ref 12.4–15.4)
EOSINOPHIL # BLD: 0.3 K/UL (ref 0–0.6)
EOSINOPHIL NFR BLD: 2.8 %
GFR SERPLBLD CREATININE-BSD FMLA CKD-EPI: 43 ML/MIN/{1.73_M2}
GLUCOSE SERPL-MCNC: 172 MG/DL (ref 70–99)
HCO3 BLDV-SCNC: 26 MMOL/L (ref 23–29)
HCO3 BLDV-SCNC: 29 MMOL/L (ref 23–29)
HCT VFR BLD AUTO: 44.9 % (ref 40.5–52.5)
HGB BLD-MCNC: 15 G/DL (ref 13.5–17.5)
LYMPHOCYTES # BLD: 3 K/UL (ref 1–5.1)
LYMPHOCYTES NFR BLD: 27.8 %
MCH RBC QN AUTO: 31.5 PG (ref 26–34)
MCHC RBC AUTO-ENTMCNC: 33.5 G/DL (ref 31–36)
MCV RBC AUTO: 94.1 FL (ref 80–100)
METHGB MFR BLDV: 0.3 %
METHGB MFR BLDV: 0.6 %
MONOCYTES # BLD: 1.2 K/UL (ref 0–1.3)
MONOCYTES NFR BLD: 11 %
NEUTROPHILS # BLD: 6.3 K/UL (ref 1.7–7.7)
NEUTROPHILS NFR BLD: 57.9 %
NT-PROBNP SERPL-MCNC: 914 PG/ML (ref 0–449)
O2 THERAPY: ABNORMAL
O2 THERAPY: ABNORMAL
PCO2 BLDV: 58.3 MMHG (ref 40–50)
PCO2 BLDV: 65.7 MMHG (ref 40–50)
PH BLDV: 7.21 [PH] (ref 7.35–7.45)
PH BLDV: 7.3 [PH] (ref 7.35–7.45)
PLATELET # BLD AUTO: 195 K/UL (ref 135–450)
PMV BLD AUTO: 10.3 FL (ref 5–10.5)
PO2 BLDV: 152 MMHG
PO2 BLDV: 30 MMHG
POTASSIUM SERPL-SCNC: 4.7 MMOL/L (ref 3.5–5.1)
RBC # BLD AUTO: 4.77 M/UL (ref 4.2–5.9)
REASON FOR REJECTION: NORMAL
REJECTED TEST: NORMAL
SAO2 % BLDV: 51 %
SAO2 % BLDV: 98 %
SODIUM SERPL-SCNC: 137 MMOL/L (ref 136–145)
TROPONIN T SERPL-MCNC: <0.01 NG/ML
WBC # BLD AUTO: 10.9 K/UL (ref 4–11)

## 2023-04-01 PROCEDURE — 36415 COLL VENOUS BLD VENIPUNCTURE: CPT

## 2023-04-01 PROCEDURE — 99285 EMERGENCY DEPT VISIT HI MDM: CPT

## 2023-04-01 PROCEDURE — 80048 BASIC METABOLIC PNL TOTAL CA: CPT

## 2023-04-01 PROCEDURE — 96374 THER/PROPH/DIAG INJ IV PUSH: CPT

## 2023-04-01 PROCEDURE — 94761 N-INVAS EAR/PLS OXIMETRY MLT: CPT

## 2023-04-01 PROCEDURE — 83036 HEMOGLOBIN GLYCOSYLATED A1C: CPT

## 2023-04-01 PROCEDURE — 84484 ASSAY OF TROPONIN QUANT: CPT

## 2023-04-01 PROCEDURE — 71045 X-RAY EXAM CHEST 1 VIEW: CPT

## 2023-04-01 PROCEDURE — 6360000002 HC RX W HCPCS: Performed by: EMERGENCY MEDICINE

## 2023-04-01 PROCEDURE — 6370000000 HC RX 637 (ALT 250 FOR IP): Performed by: EMERGENCY MEDICINE

## 2023-04-01 PROCEDURE — 94660 CPAP INITIATION&MGMT: CPT

## 2023-04-01 PROCEDURE — 85025 COMPLETE CBC W/AUTO DIFF WBC: CPT

## 2023-04-01 PROCEDURE — 82803 BLOOD GASES ANY COMBINATION: CPT

## 2023-04-01 PROCEDURE — 83880 ASSAY OF NATRIURETIC PEPTIDE: CPT

## 2023-04-01 PROCEDURE — 93005 ELECTROCARDIOGRAM TRACING: CPT | Performed by: EMERGENCY MEDICINE

## 2023-04-01 PROCEDURE — 2060000000 HC ICU INTERMEDIATE R&B

## 2023-04-01 PROCEDURE — 2700000000 HC OXYGEN THERAPY PER DAY

## 2023-04-01 RX ORDER — FUROSEMIDE 10 MG/ML
40 INJECTION INTRAMUSCULAR; INTRAVENOUS ONCE
Status: COMPLETED | OUTPATIENT
Start: 2023-04-01 | End: 2023-04-01

## 2023-04-01 RX ADMIN — NITROGLYCERIN 0.5 INCH: 20 OINTMENT TOPICAL at 19:09

## 2023-04-01 RX ADMIN — FUROSEMIDE 40 MG: 10 INJECTION, SOLUTION INTRAMUSCULAR; INTRAVENOUS at 19:08

## 2023-04-01 ASSESSMENT — PAIN DESCRIPTION - LOCATION
LOCATION: CHEST
LOCATION: CHEST

## 2023-04-01 ASSESSMENT — PAIN DESCRIPTION - PAIN TYPE
TYPE: ACUTE PAIN
TYPE: ACUTE PAIN

## 2023-04-01 ASSESSMENT — PAIN - FUNCTIONAL ASSESSMENT: PAIN_FUNCTIONAL_ASSESSMENT: 0-10

## 2023-04-01 ASSESSMENT — PAIN DESCRIPTION - ORIENTATION
ORIENTATION: MID
ORIENTATION: MID

## 2023-04-01 ASSESSMENT — ENCOUNTER SYMPTOMS
SHORTNESS OF BREATH: 1
WHEEZING: 0
ABDOMINAL PAIN: 0
COUGH: 1

## 2023-04-01 ASSESSMENT — PAIN SCALES - GENERAL
PAINLEVEL_OUTOF10: 10
PAINLEVEL_OUTOF10: 5

## 2023-04-01 ASSESSMENT — PAIN DESCRIPTION - DESCRIPTORS
DESCRIPTORS: PATIENT UNABLE TO DESCRIBE
DESCRIPTORS: PATIENT UNABLE TO DESCRIBE

## 2023-04-01 NOTE — ED NOTES
Pt arrived to dept via EMS from home. Pt c/o sudden onset resp distress and mid sternal chest pain starting approx 1.5 hrs ago at home. Pt in visible resp distress and unable to respond with more than one word answers upon arrival. Pt received a duoneb tx en route and placed on 9 l/min O2 with an SpO2 of 91%. Pt placed on a nonrebreather and SpO2 up to 95%. Dr. Mile Orr to bedside. RT notified. Pt awake, alert and oriented x 3. Skin warm and diaphoretic/normal color for ethnicity. Resp labored, with audible wheezing and crackles. Pt placed in gown and on cardiac monitor. Call light in reach. Will continue to monitor.        [address] [facility] Shahid, RN  04/01/23 1944

## 2023-04-01 NOTE — ED PROVIDER NOTES
nightly as needed (Back pain)       ALLERGIES     Patient has no known allergies. FAMILY HISTORY       Family History   Problem Relation Age of Onset    Cancer Father         prostate    Diabetes Sister           SOCIAL HISTORY       Social History     Socioeconomic History    Marital status:      Spouse name: None    Number of children: None    Years of education: None    Highest education level: None   Tobacco Use    Smoking status: Former     Packs/day: 0.25     Years: 5.00     Pack years: 1.25     Types: Cigarettes     Quit date: 10/1/1990     Years since quittin.5    Smokeless tobacco: Never    Tobacco comments:     quit 25 years ago   Vaping Use    Vaping Use: Never used   Substance and Sexual Activity    Alcohol use: Yes     Alcohol/week: 2.0 standard drinks     Types: 1 Glasses of wine, 1 Cans of beer per week    Drug use: No     Social Determinants of Health     Financial Resource Strain: Low Risk     Difficulty of Paying Living Expenses: Not hard at all   Food Insecurity: No Food Insecurity    Worried About Running Out of Food in the Last Year: Never true    Ran Out of Food in the Last Year: Never true   Transportation Needs: No Transportation Needs    Lack of Transportation (Medical): No    Lack of Transportation (Non-Medical):  No   Physical Activity: Sufficiently Active    Days of Exercise per Week: 7 days    Minutes of Exercise per Session: 40 min   Intimate Partner Violence: Not At Risk    Fear of Current or Ex-Partner: No    Emotionally Abused: No    Physically Abused: No    Sexually Abused: No       SCREENINGS         Marcos Coma Scale  Eye Opening: Spontaneous  Best Verbal Response: Oriented  Best Motor Response: Obeys commands  [place] Coma Scale Score: 15                     CIWA Assessment  BP: (!) 141/39  Heart Rate: 62                 PHYSICAL EXAM    (up to 7 for level 4, 8 or more for level 5)     ED Triage Vitals   BP Temp Temp src Heart Rate Resp SpO2 Height Weight

## 2023-04-02 LAB
ANION GAP SERPL CALCULATED.3IONS-SCNC: 15 MMOL/L (ref 3–16)
BASOPHILS # BLD: 0 K/UL (ref 0–0.2)
BASOPHILS NFR BLD: 0.3 %
BUN SERPL-MCNC: 33 MG/DL (ref 7–20)
CALCIUM SERPL-MCNC: 8.7 MG/DL (ref 8.3–10.6)
CHLORIDE SERPL-SCNC: 102 MMOL/L (ref 99–110)
CO2 SERPL-SCNC: 24 MMOL/L (ref 21–32)
CREAT SERPL-MCNC: 1.3 MG/DL (ref 0.8–1.3)
DEPRECATED RDW RBC AUTO: 13.7 % (ref 12.4–15.4)
EKG ATRIAL RATE: 54 BPM
EKG DIAGNOSIS: NORMAL
EKG P AXIS: 52 DEGREES
EKG P-R INTERVAL: 240 MS
EKG Q-T INTERVAL: 492 MS
EKG QRS DURATION: 92 MS
EKG QTC CALCULATION (BAZETT): 466 MS
EKG R AXIS: -3 DEGREES
EKG T AXIS: 31 DEGREES
EKG VENTRICULAR RATE: 54 BPM
EOSINOPHIL # BLD: 0.1 K/UL (ref 0–0.6)
EOSINOPHIL NFR BLD: 1.3 %
EST. AVERAGE GLUCOSE BLD GHB EST-MCNC: 116.9 MG/DL
GFR SERPLBLD CREATININE-BSD FMLA CKD-EPI: 55 ML/MIN/{1.73_M2}
GLUCOSE BLD-MCNC: 102 MG/DL (ref 70–99)
GLUCOSE BLD-MCNC: 139 MG/DL (ref 70–99)
GLUCOSE BLD-MCNC: 165 MG/DL (ref 70–99)
GLUCOSE BLD-MCNC: 170 MG/DL (ref 70–99)
GLUCOSE BLD-MCNC: 93 MG/DL (ref 70–99)
GLUCOSE SERPL-MCNC: 102 MG/DL (ref 70–99)
HBA1C MFR BLD: 5.7 %
HCT VFR BLD AUTO: 40.2 % (ref 40.5–52.5)
HGB BLD-MCNC: 13.5 G/DL (ref 13.5–17.5)
LYMPHOCYTES # BLD: 1.4 K/UL (ref 1–5.1)
LYMPHOCYTES NFR BLD: 16.5 %
MCH RBC QN AUTO: 31 PG (ref 26–34)
MCHC RBC AUTO-ENTMCNC: 33.5 G/DL (ref 31–36)
MCV RBC AUTO: 92.6 FL (ref 80–100)
MONOCYTES # BLD: 1 K/UL (ref 0–1.3)
MONOCYTES NFR BLD: 12 %
NEUTROPHILS # BLD: 5.9 K/UL (ref 1.7–7.7)
NEUTROPHILS NFR BLD: 69.9 %
PERFORMED ON: ABNORMAL
PERFORMED ON: NORMAL
PLATELET # BLD AUTO: 132 K/UL (ref 135–450)
PMV BLD AUTO: 10.1 FL (ref 5–10.5)
POTASSIUM SERPL-SCNC: 4.1 MMOL/L (ref 3.5–5.1)
RBC # BLD AUTO: 4.34 M/UL (ref 4.2–5.9)
SODIUM SERPL-SCNC: 141 MMOL/L (ref 136–145)
WBC # BLD AUTO: 8.5 K/UL (ref 4–11)

## 2023-04-02 PROCEDURE — 6360000002 HC RX W HCPCS: Performed by: INTERNAL MEDICINE

## 2023-04-02 PROCEDURE — 36415 COLL VENOUS BLD VENIPUNCTURE: CPT

## 2023-04-02 PROCEDURE — 94761 N-INVAS EAR/PLS OXIMETRY MLT: CPT

## 2023-04-02 PROCEDURE — 80048 BASIC METABOLIC PNL TOTAL CA: CPT

## 2023-04-02 PROCEDURE — 85025 COMPLETE CBC W/AUTO DIFF WBC: CPT

## 2023-04-02 PROCEDURE — 6370000000 HC RX 637 (ALT 250 FOR IP): Performed by: INTERNAL MEDICINE

## 2023-04-02 PROCEDURE — 2580000003 HC RX 258: Performed by: INTERNAL MEDICINE

## 2023-04-02 PROCEDURE — 93010 ELECTROCARDIOGRAM REPORT: CPT | Performed by: INTERNAL MEDICINE

## 2023-04-02 PROCEDURE — 2060000000 HC ICU INTERMEDIATE R&B

## 2023-04-02 PROCEDURE — 94660 CPAP INITIATION&MGMT: CPT

## 2023-04-02 RX ORDER — DEXTROSE MONOHYDRATE 100 MG/ML
INJECTION, SOLUTION INTRAVENOUS CONTINUOUS PRN
Status: DISCONTINUED | OUTPATIENT
Start: 2023-04-02 | End: 2023-04-04 | Stop reason: HOSPADM

## 2023-04-02 RX ORDER — SODIUM CHLORIDE 0.9 % (FLUSH) 0.9 %
10 SYRINGE (ML) INJECTION PRN
Status: DISCONTINUED | OUTPATIENT
Start: 2023-04-02 | End: 2023-04-04 | Stop reason: HOSPADM

## 2023-04-02 RX ORDER — INSULIN LISPRO 100 [IU]/ML
0-8 INJECTION, SOLUTION INTRAVENOUS; SUBCUTANEOUS
Status: DISCONTINUED | OUTPATIENT
Start: 2023-04-02 | End: 2023-04-04 | Stop reason: HOSPADM

## 2023-04-02 RX ORDER — ACETAMINOPHEN 650 MG/1
650 SUPPOSITORY RECTAL EVERY 4 HOURS PRN
Status: DISCONTINUED | OUTPATIENT
Start: 2023-04-02 | End: 2023-04-04 | Stop reason: HOSPADM

## 2023-04-02 RX ORDER — ASPIRIN 81 MG/1
81 TABLET ORAL DAILY
Status: DISCONTINUED | OUTPATIENT
Start: 2023-04-02 | End: 2023-04-04 | Stop reason: HOSPADM

## 2023-04-02 RX ORDER — ACETAMINOPHEN 325 MG/1
650 TABLET ORAL EVERY 4 HOURS PRN
Status: DISCONTINUED | OUTPATIENT
Start: 2023-04-02 | End: 2023-04-04 | Stop reason: HOSPADM

## 2023-04-02 RX ORDER — INSULIN LISPRO 100 [IU]/ML
0-4 INJECTION, SOLUTION INTRAVENOUS; SUBCUTANEOUS NIGHTLY
Status: DISCONTINUED | OUTPATIENT
Start: 2023-04-02 | End: 2023-04-04 | Stop reason: HOSPADM

## 2023-04-02 RX ORDER — POLYETHYLENE GLYCOL 3350 17 G/17G
17 POWDER, FOR SOLUTION ORAL DAILY PRN
Status: DISCONTINUED | OUTPATIENT
Start: 2023-04-02 | End: 2023-04-04 | Stop reason: HOSPADM

## 2023-04-02 RX ORDER — ONDANSETRON 2 MG/ML
4 INJECTION INTRAMUSCULAR; INTRAVENOUS EVERY 4 HOURS PRN
Status: DISCONTINUED | OUTPATIENT
Start: 2023-04-02 | End: 2023-04-04 | Stop reason: HOSPADM

## 2023-04-02 RX ORDER — TIZANIDINE 4 MG/1
2 TABLET ORAL NIGHTLY PRN
Status: DISCONTINUED | OUTPATIENT
Start: 2023-04-02 | End: 2023-04-04 | Stop reason: HOSPADM

## 2023-04-02 RX ORDER — AMIODARONE HYDROCHLORIDE 200 MG/1
200 TABLET ORAL DAILY
Status: DISCONTINUED | OUTPATIENT
Start: 2023-04-02 | End: 2023-04-04 | Stop reason: HOSPADM

## 2023-04-02 RX ORDER — SODIUM CHLORIDE 0.9 % (FLUSH) 0.9 %
10 SYRINGE (ML) INJECTION EVERY 12 HOURS SCHEDULED
Status: DISCONTINUED | OUTPATIENT
Start: 2023-04-02 | End: 2023-04-04 | Stop reason: HOSPADM

## 2023-04-02 RX ORDER — SODIUM CHLORIDE 9 MG/ML
INJECTION, SOLUTION INTRAVENOUS PRN
Status: DISCONTINUED | OUTPATIENT
Start: 2023-04-02 | End: 2023-04-04 | Stop reason: HOSPADM

## 2023-04-02 RX ORDER — ENOXAPARIN SODIUM 100 MG/ML
30 INJECTION SUBCUTANEOUS 2 TIMES DAILY
Status: DISCONTINUED | OUTPATIENT
Start: 2023-04-02 | End: 2023-04-04 | Stop reason: HOSPADM

## 2023-04-02 RX ORDER — FUROSEMIDE 10 MG/ML
40 INJECTION INTRAMUSCULAR; INTRAVENOUS 2 TIMES DAILY
Status: DISCONTINUED | OUTPATIENT
Start: 2023-04-02 | End: 2023-04-03

## 2023-04-02 RX ORDER — PANTOPRAZOLE SODIUM 40 MG/1
40 TABLET, DELAYED RELEASE ORAL
Status: DISCONTINUED | OUTPATIENT
Start: 2023-04-02 | End: 2023-04-04 | Stop reason: HOSPADM

## 2023-04-02 RX ORDER — ATORVASTATIN CALCIUM 80 MG/1
80 TABLET, FILM COATED ORAL DAILY
Status: DISCONTINUED | OUTPATIENT
Start: 2023-04-02 | End: 2023-04-04 | Stop reason: HOSPADM

## 2023-04-02 RX ORDER — CARVEDILOL 25 MG/1
25 TABLET ORAL 2 TIMES DAILY WITH MEALS
Status: DISCONTINUED | OUTPATIENT
Start: 2023-04-02 | End: 2023-04-04 | Stop reason: HOSPADM

## 2023-04-02 RX ADMIN — Medication 10 ML: at 09:07

## 2023-04-02 RX ADMIN — FUROSEMIDE 40 MG: 10 INJECTION, SOLUTION INTRAMUSCULAR; INTRAVENOUS at 09:07

## 2023-04-02 RX ADMIN — ENOXAPARIN SODIUM 30 MG: 100 INJECTION SUBCUTANEOUS at 09:07

## 2023-04-02 RX ADMIN — AMIODARONE HYDROCHLORIDE 200 MG: 200 TABLET ORAL at 09:06

## 2023-04-02 RX ADMIN — FUROSEMIDE 40 MG: 10 INJECTION, SOLUTION INTRAMUSCULAR; INTRAVENOUS at 17:55

## 2023-04-02 RX ADMIN — CARVEDILOL 25 MG: 25 TABLET, FILM COATED ORAL at 09:06

## 2023-04-02 RX ADMIN — ENOXAPARIN SODIUM 30 MG: 100 INJECTION SUBCUTANEOUS at 20:51

## 2023-04-02 RX ADMIN — ASPIRIN 81 MG: 81 TABLET, COATED ORAL at 09:06

## 2023-04-02 RX ADMIN — Medication 10 ML: at 20:51

## 2023-04-02 RX ADMIN — ATORVASTATIN CALCIUM 80 MG: 80 TABLET, FILM COATED ORAL at 09:06

## 2023-04-02 RX ADMIN — PANTOPRAZOLE SODIUM 40 MG: 40 TABLET, DELAYED RELEASE ORAL at 09:06

## 2023-04-02 ASSESSMENT — PAIN SCALES - GENERAL: PAINLEVEL_OUTOF10: 0

## 2023-04-02 NOTE — PLAN OF CARE
Problem: ABCDS Injury Assessment  Goal: Absence of physical injury  4/2/2023 1402 by Bryant Holt, RN  Outcome: Progressing     Problem: Safety - Adult  Goal: Free from fall injury  Outcome: Progressing

## 2023-04-02 NOTE — H&P
obliterated with pressures   Skin: Skin color, texture, turgor normal. No rashes or lesions on exposed skin  Neurologic: Neurovascularly intact without any focal sensory/motor deficits. Cranial nerves: II-XII intact, grossly non-focal. Gait was not tested. Mental Status: Alert and oriented, thought content appropriate, normal insight        CBC:   Recent Labs     04/01/23  1858   WBC 10.9   HGB 15.0        BMP:    Recent Labs     04/01/23  1858      K 4.7      CO2 22   BUN 29*   CREATININE 1.6*   GLUCOSE 172*     Troponin:   Recent Labs     04/01/23 1858   TROPONINI <0.01     PT/INR:  No results found for: PTINR  U/A:    Lab Results   Component Value Date/Time    LEUKOCYTESUR Negative 09/17/2022 09:55 PM    NITRITE neg 04/26/2013 01:44 PM    RBCUA 1 06/09/2021 10:31 PM    SPECGRAV 1.020 09/17/2022 09:55 PM    UROBILINOGEN 0.2 09/17/2022 09:55 PM    BILIRUBINUR Negative 09/17/2022 09:55 PM    BILIRUBINUR neg 04/26/2013 01:44 PM    BLOODU Negative 09/17/2022 09:55 PM    GLUCOSEU Negative 09/17/2022 09:55 PM    PROTEINU Negative 09/17/2022 09:55 PM         RAD:   I have independently reviewed and interpreted the imaging studies below and based my recommendations to the patient on those findings. XR CHEST PORTABLE    Result Date: 4/1/2023  EXAMINATION: ONE XRAY VIEW OF THE CHEST 4/1/2023 6:56 pm COMPARISON: 09/17/2022 HISTORY: Acute shortness of breath. FINDINGS: Evaluation is suboptimal secondary to body habitus and portable technique. In addition, the patient is rotated. Stable mild cardiomegaly. Perihilar opacities are suspicious for pulmonary edema. No definite pleural effusion. No pneumothorax. Mild cardiomegaly with pulmonary edema.          EKG:   Read by ER in the absence of a Cardiologist shows  Rhythm: sinus bradycardia  Rate: 50-60  Axis: normal  Ectopy: none  Conduction: 1st degree AV block  ST Segments: no acute change  T Waves: no acute change  Q Waves:

## 2023-04-02 NOTE — PLAN OF CARE
Plan of care initiated. 4 Eyes Skin Assessment     NAME:  La Canales  YOB: 1940  MEDICAL RECORD NUMBER:  [id number]    The patient is being assessed for  Admission    I agree that One RN has performed a thorough Head to Toe Skin Assessment on the patient. ALL assessment sites listed below have been assessed. Areas assessed by both nurses:    Head, Face, Ears, Shoulders, Back, Chest, Arms, Elbows, Hands, Sacrum. Buttock, Coccyx, Ischium, and Legs. Feet and Heels        Does the Patient have a Wound?  No noted wound(s)       Hansel Prevention initiated by RN: NA   Wound Care Orders initiated by RN: NA    Pressure Injury (Stage 3,4, Unstageable, DTI, NWPT, and Complex wounds) if present, place referral order by RN under : NA    New and Established Ostomies, if present place, referral order under : NA      Nurse 1 eSignature: Electronically signed by Maddie Rivera RN on 4/2/23 at 3:07 AM EDT    **SHARE this note so that the co-signing nurse can place an eSignature**    Nurse 2 eSignature: Electronically signed by Arturo Gerardo RN on 4/2/23 at 3:07 AM EDT

## 2023-04-02 NOTE — ED NOTES
Report called to [facility] [place].  Will transport [facility] BANDAR Lay  04/01/23 0640

## 2023-04-03 LAB
ANION GAP SERPL CALCULATED.3IONS-SCNC: 14 MMOL/L (ref 3–16)
BASOPHILS # BLD: 0 K/UL (ref 0–0.2)
BASOPHILS NFR BLD: 0.4 %
BUN SERPL-MCNC: 33 MG/DL (ref 7–20)
CALCIUM SERPL-MCNC: 8.9 MG/DL (ref 8.3–10.6)
CHLORIDE SERPL-SCNC: 100 MMOL/L (ref 99–110)
CO2 SERPL-SCNC: 25 MMOL/L (ref 21–32)
CREAT SERPL-MCNC: 1.5 MG/DL (ref 0.8–1.3)
DEPRECATED RDW RBC AUTO: 13.7 % (ref 12.4–15.4)
EOSINOPHIL # BLD: 0.1 K/UL (ref 0–0.6)
EOSINOPHIL NFR BLD: 1.5 %
GFR SERPLBLD CREATININE-BSD FMLA CKD-EPI: 47 ML/MIN/{1.73_M2}
GLUCOSE BLD-MCNC: 113 MG/DL (ref 70–99)
GLUCOSE BLD-MCNC: 127 MG/DL (ref 70–99)
GLUCOSE BLD-MCNC: 131 MG/DL (ref 70–99)
GLUCOSE BLD-MCNC: 187 MG/DL (ref 70–99)
GLUCOSE SERPL-MCNC: 112 MG/DL (ref 70–99)
HCT VFR BLD AUTO: 40.6 % (ref 40.5–52.5)
HGB BLD-MCNC: 13.7 G/DL (ref 13.5–17.5)
LYMPHOCYTES # BLD: 1.8 K/UL (ref 1–5.1)
LYMPHOCYTES NFR BLD: 23.9 %
MAGNESIUM SERPL-MCNC: 2 MG/DL (ref 1.8–2.4)
MCH RBC QN AUTO: 31.1 PG (ref 26–34)
MCHC RBC AUTO-ENTMCNC: 33.9 G/DL (ref 31–36)
MCV RBC AUTO: 91.8 FL (ref 80–100)
MONOCYTES # BLD: 1.1 K/UL (ref 0–1.3)
MONOCYTES NFR BLD: 14.8 %
NEUTROPHILS # BLD: 4.5 K/UL (ref 1.7–7.7)
NEUTROPHILS NFR BLD: 59.4 %
PERFORMED ON: ABNORMAL
PLATELET # BLD AUTO: 135 K/UL (ref 135–450)
PMV BLD AUTO: 9.2 FL (ref 5–10.5)
POTASSIUM SERPL-SCNC: 3.5 MMOL/L (ref 3.5–5.1)
RBC # BLD AUTO: 4.42 M/UL (ref 4.2–5.9)
SODIUM SERPL-SCNC: 139 MMOL/L (ref 136–145)
WBC # BLD AUTO: 7.5 K/UL (ref 4–11)

## 2023-04-03 PROCEDURE — 6370000000 HC RX 637 (ALT 250 FOR IP): Performed by: INTERNAL MEDICINE

## 2023-04-03 PROCEDURE — 2060000000 HC ICU INTERMEDIATE R&B

## 2023-04-03 PROCEDURE — 6370000000 HC RX 637 (ALT 250 FOR IP): Performed by: STUDENT IN AN ORGANIZED HEALTH CARE EDUCATION/TRAINING PROGRAM

## 2023-04-03 PROCEDURE — 2580000003 HC RX 258: Performed by: INTERNAL MEDICINE

## 2023-04-03 PROCEDURE — 80048 BASIC METABOLIC PNL TOTAL CA: CPT

## 2023-04-03 PROCEDURE — 99222 1ST HOSP IP/OBS MODERATE 55: CPT | Performed by: STUDENT IN AN ORGANIZED HEALTH CARE EDUCATION/TRAINING PROGRAM

## 2023-04-03 PROCEDURE — 6360000002 HC RX W HCPCS: Performed by: INTERNAL MEDICINE

## 2023-04-03 PROCEDURE — 85025 COMPLETE CBC W/AUTO DIFF WBC: CPT

## 2023-04-03 PROCEDURE — 36415 COLL VENOUS BLD VENIPUNCTURE: CPT

## 2023-04-03 PROCEDURE — 83735 ASSAY OF MAGNESIUM: CPT

## 2023-04-03 RX ORDER — FUROSEMIDE 40 MG/1
40 TABLET ORAL DAILY
Status: DISCONTINUED | OUTPATIENT
Start: 2023-04-04 | End: 2023-04-04

## 2023-04-03 RX ORDER — SPIRONOLACTONE 25 MG/1
25 TABLET ORAL DAILY
Status: DISCONTINUED | OUTPATIENT
Start: 2023-04-03 | End: 2023-04-04 | Stop reason: HOSPADM

## 2023-04-03 RX ADMIN — ASPIRIN 81 MG: 81 TABLET, COATED ORAL at 08:27

## 2023-04-03 RX ADMIN — CARVEDILOL 25 MG: 25 TABLET, FILM COATED ORAL at 17:08

## 2023-04-03 RX ADMIN — FUROSEMIDE 40 MG: 10 INJECTION, SOLUTION INTRAMUSCULAR; INTRAVENOUS at 08:27

## 2023-04-03 RX ADMIN — Medication 10 ML: at 20:49

## 2023-04-03 RX ADMIN — ENOXAPARIN SODIUM 30 MG: 100 INJECTION SUBCUTANEOUS at 20:47

## 2023-04-03 RX ADMIN — ENOXAPARIN SODIUM 30 MG: 100 INJECTION SUBCUTANEOUS at 08:27

## 2023-04-03 RX ADMIN — POLYETHYLENE GLYCOL 3350 17 G: 17 POWDER, FOR SOLUTION ORAL at 17:08

## 2023-04-03 RX ADMIN — CARVEDILOL 25 MG: 25 TABLET, FILM COATED ORAL at 08:27

## 2023-04-03 RX ADMIN — SPIRONOLACTONE 25 MG: 25 TABLET ORAL at 17:08

## 2023-04-03 RX ADMIN — PANTOPRAZOLE SODIUM 40 MG: 40 TABLET, DELAYED RELEASE ORAL at 07:23

## 2023-04-03 RX ADMIN — AMIODARONE HYDROCHLORIDE 200 MG: 200 TABLET ORAL at 08:27

## 2023-04-03 RX ADMIN — ATORVASTATIN CALCIUM 80 MG: 80 TABLET, FILM COATED ORAL at 08:27

## 2023-04-03 RX ADMIN — Medication 10 ML: at 08:27

## 2023-04-03 ASSESSMENT — PAIN SCALES - GENERAL: PAINLEVEL_OUTOF10: 0

## 2023-04-03 NOTE — CONSULTS
apixaban (ELIQUIS) 5 MG TABS tablet Take by mouth 2 times daily   Yes Historical Provider, MD   metFORMIN (GLUCOPHAGE) 500 MG tablet Take 1 tablet by mouth daily (with breakfast) 3/9/23   ARACELY Chan   blood glucose monitor kit and supplies Dispense sufficient amount for indicated testing frequency plus additional to accommodate PRN testing needs. Dispense all needed supplies to include: monitor, strips, lancing device, lancets, control solutions, alcohol swabs. 1/12/23   Venu Sellers MD   Lancets MISC 1 each by Does not apply route daily 1/12/23   Venu Sellers MD   blood glucose monitor strips Test 1 times a day & as needed for symptoms of irregular blood glucose. Dispense sufficient amount for indicated testing frequency plus additional to accommodate PRN testing needs. 1/12/23   Venu Sellers MD   furosemide (LASIX) 40 MG tablet TAKE 1 TABLET EVERY DAY  Patient taking differently: Take 10 mg by mouth daily 10/19/22   Venu Sellers MD   amiodarone (CORDARONE) 200 MG tablet TAKE 1 TABLET EVERY DAY 9/25/22   Venu Sellers MD   empagliflozin (JARDIANCE) 10 MG tablet Take 1 tablet by mouth daily 9/14/22   Venu Sellers MD   spironolactone (ALDACTONE) 25 MG tablet TAKE 1 TABLET EVERY DAY 8/14/22   Venu Sellers MD   atorvastatin (LIPITOR) 80 MG tablet TAKE 1 TABLET EVERY DAY 8/14/22   Venu Sellers MD   carvedilol (COREG) 25 MG tablet TAKE 1 TABLET TWICE DAILY WITH MEALS 8/14/22   Venu Sellers MD   diclofenac sodium (VOLTAREN) 1 % GEL Apply 4 g topically 4 times daily as needed for Pain 7/26/22   Renaldo Dennis MD   omeprazole (PRILOSEC) 20 MG delayed release capsule Take 20 mg by mouth daily    Historical Provider, MD   aspirin EC 81 MG EC tablet Take 1 tablet by mouth daily.  2/22/13   Isabel Carrero MD        Current Medications:  Current Facility-Administered Medications   Medication Dose Route Frequency

## 2023-04-03 NOTE — ACP (ADVANCE CARE PLANNING)
Advance Care Planning     Advance Care Planning Activator (Inpatient)  Conversation Note      Date of ACP Conversation: 4/3/2023     Conversation Conducted with: Patient with Decision Making Capacity    ACP Activator: Mirella Matthew 47 Decision Maker:     Current Designated Health Care Decision Maker:     Primary Decision Maker: Abel Young - Spouse - [phone]    Secondary Decision Maker: Chela Fontaine - [phone]    Today we documented Decision Maker(s) consistent with Legal Next of Kin hierarchy. Care Preferences    Ventilation: \"If you were in your present state of health and suddenly became very ill and were unable to breathe on your own, what would your preference be about the use of a ventilator (breathing machine) if it were available to you? \"      Would the patient desire the use of ventilator (breathing machine)?: yes    \"If your health worsens and it becomes clear that your chance of recovery is unlikely, what would your preference be about the use of a ventilator (breathing machine) if it were available to you? \"     Would the patient desire the use of ventilator (breathing machine)?: No      Resuscitation  \"CPR works best to restart the heart when there is a sudden event, like a heart attack, in someone who is otherwise healthy. Unfortunately, CPR does not typically restart the heart for people who have serious health conditions or who are very sick. \"    \"In the event your heart stopped as a result of an underlying serious health condition, would you want attempts to be made to restart your heart (answer \"yes\" for attempt to resuscitate) or would you prefer a natural death (answer \"no\" for do not attempt to resuscitate)? \" yes       [] Yes   [] No   Educated Patient / Rebeka Ambrose regarding differences between Advance Directives and portable DNR orders.     Length of ACP Conversation in minutes:      Conversation Outcomes:  ACP discussion

## 2023-04-03 NOTE — CARE COORDINATION
Case Management Assessment  Initial Evaluation    Date/Time of Evaluation: 4/3/2023 2:08 PM  Assessment Completed by: Sunita Webb RN    If patient is discharged prior to next notation, then this note serves as note for discharge by case management. Patient Name: Tamiko Ornelas                   YOB: 1940  Diagnosis: Acute respiratory distress [R06.03]  Acute on chronic diastolic (congestive) heart failure ([facility]) [I50.33]  Acute on chronic congestive heart failure, unspecified heart failure type [facility]) [I50.9]                   Date / Time: 4/1/2023  6:38 PM    Patient Admission Status: Inpatient   Readmission Risk (Low < 19, Mod (19-27), High > 27): Readmission Risk Score: 13.5    Current PCP: Berlin Anderson MD  PCP verified by CM? Yes    Chart Reviewed: Yes      History Provided by: Patient, Significant Other  Patient Orientation: Alert and Oriented    Patient Cognition: Alert    Hospitalization in the last 30 days (Readmission):  No    If yes, Readmission Assessment in  Navigator will be completed. Advance Directives:      Code Status: Full Code   Patient's Primary Decision Maker is: Legal Next of Kin    Primary Decision Maker: Madison Dejesus - Spouse - [phone]    Secondary Decision Maker: Billy Weston Child - [phone]    Discharge Planning:    Patient lives with: Spouse/Significant Other Type of Home: House  Primary Care Giver: Spouse  Patient Support Systems include: Spouse/Significant Other   Current Financial resources: Darrel Meraz  Current community resources:  None  Current services prior to admission: None            Current DME:  None            Type of Home Care services:  None    ADLS  Prior functional level: Independent in ADLs/IADLs  Current functional level: Independent in ADLs/IADLs    No current PT/OT orders    Family can provide assistance at DC:  Yes  Would you like Case Management to discuss the discharge plan with any other family

## 2023-04-04 VITALS
WEIGHT: 216.27 LBS | OXYGEN SATURATION: 91 % | DIASTOLIC BLOOD PRESSURE: 61 MMHG | TEMPERATURE: 97.8 F | BODY MASS INDEX: 34.76 KG/M2 | RESPIRATION RATE: 18 BRPM | HEART RATE: 73 BPM | SYSTOLIC BLOOD PRESSURE: 104 MMHG | HEIGHT: 66 IN

## 2023-04-04 LAB
ANION GAP SERPL CALCULATED.3IONS-SCNC: 14 MMOL/L (ref 3–16)
BASOPHILS # BLD: 0 K/UL (ref 0–0.2)
BASOPHILS NFR BLD: 0.6 %
BUN SERPL-MCNC: 33 MG/DL (ref 7–20)
CALCIUM SERPL-MCNC: 8.9 MG/DL (ref 8.3–10.6)
CHLORIDE SERPL-SCNC: 98 MMOL/L (ref 99–110)
CO2 SERPL-SCNC: 26 MMOL/L (ref 21–32)
CREAT SERPL-MCNC: 1.5 MG/DL (ref 0.8–1.3)
DEPRECATED RDW RBC AUTO: 13.7 % (ref 12.4–15.4)
EOSINOPHIL # BLD: 0.2 K/UL (ref 0–0.6)
EOSINOPHIL NFR BLD: 2.1 %
GFR SERPLBLD CREATININE-BSD FMLA CKD-EPI: 47 ML/MIN/{1.73_M2}
GLUCOSE BLD-MCNC: 111 MG/DL (ref 70–99)
GLUCOSE BLD-MCNC: 146 MG/DL (ref 70–99)
GLUCOSE BLD-MCNC: 149 MG/DL (ref 70–99)
GLUCOSE SERPL-MCNC: 114 MG/DL (ref 70–99)
HCT VFR BLD AUTO: 39.9 % (ref 40.5–52.5)
HGB BLD-MCNC: 13.5 G/DL (ref 13.5–17.5)
LV EF: 53 %
LVEF MODALITY: NORMAL
LYMPHOCYTES # BLD: 1.9 K/UL (ref 1–5.1)
LYMPHOCYTES NFR BLD: 24 %
MCH RBC QN AUTO: 31.2 PG (ref 26–34)
MCHC RBC AUTO-ENTMCNC: 33.8 G/DL (ref 31–36)
MCV RBC AUTO: 92.3 FL (ref 80–100)
MONOCYTES # BLD: 1.1 K/UL (ref 0–1.3)
MONOCYTES NFR BLD: 13.9 %
NEUTROPHILS # BLD: 4.7 K/UL (ref 1.7–7.7)
NEUTROPHILS NFR BLD: 59.4 %
PERFORMED ON: ABNORMAL
PLATELET # BLD AUTO: 128 K/UL (ref 135–450)
PMV BLD AUTO: 9.9 FL (ref 5–10.5)
POTASSIUM SERPL-SCNC: 3.8 MMOL/L (ref 3.5–5.1)
RBC # BLD AUTO: 4.32 M/UL (ref 4.2–5.9)
SODIUM SERPL-SCNC: 138 MMOL/L (ref 136–145)
WBC # BLD AUTO: 7.8 K/UL (ref 4–11)

## 2023-04-04 PROCEDURE — 80048 BASIC METABOLIC PNL TOTAL CA: CPT

## 2023-04-04 PROCEDURE — 6370000000 HC RX 637 (ALT 250 FOR IP): Performed by: INTERNAL MEDICINE

## 2023-04-04 PROCEDURE — 2580000003 HC RX 258: Performed by: INTERNAL MEDICINE

## 2023-04-04 PROCEDURE — 6360000002 HC RX W HCPCS: Performed by: INTERNAL MEDICINE

## 2023-04-04 PROCEDURE — 36415 COLL VENOUS BLD VENIPUNCTURE: CPT

## 2023-04-04 PROCEDURE — C8929 TTE W OR WO FOL WCON,DOPPLER: HCPCS

## 2023-04-04 PROCEDURE — 94761 N-INVAS EAR/PLS OXIMETRY MLT: CPT

## 2023-04-04 PROCEDURE — 99232 SBSQ HOSP IP/OBS MODERATE 35: CPT | Performed by: NURSE PRACTITIONER

## 2023-04-04 PROCEDURE — 6370000000 HC RX 637 (ALT 250 FOR IP): Performed by: STUDENT IN AN ORGANIZED HEALTH CARE EDUCATION/TRAINING PROGRAM

## 2023-04-04 PROCEDURE — 85025 COMPLETE CBC W/AUTO DIFF WBC: CPT

## 2023-04-04 PROCEDURE — 6360000004 HC RX CONTRAST MEDICATION: Performed by: FAMILY MEDICINE

## 2023-04-04 RX ORDER — FUROSEMIDE 20 MG/1
20 TABLET ORAL DAILY
Status: DISCONTINUED | OUTPATIENT
Start: 2023-04-05 | End: 2023-04-04 | Stop reason: HOSPADM

## 2023-04-04 RX ORDER — FUROSEMIDE 20 MG/1
20 TABLET ORAL DAILY
Qty: 60 TABLET | Refills: 3 | Status: SHIPPED | OUTPATIENT
Start: 2023-04-05

## 2023-04-04 RX ADMIN — Medication 10 ML: at 08:58

## 2023-04-04 RX ADMIN — ASPIRIN 81 MG: 81 TABLET, COATED ORAL at 08:57

## 2023-04-04 RX ADMIN — CARVEDILOL 25 MG: 25 TABLET, FILM COATED ORAL at 08:57

## 2023-04-04 RX ADMIN — PERFLUTREN 1.5 ML: 6.52 INJECTION, SUSPENSION INTRAVENOUS at 11:30

## 2023-04-04 RX ADMIN — SPIRONOLACTONE 25 MG: 25 TABLET ORAL at 08:57

## 2023-04-04 RX ADMIN — FUROSEMIDE 40 MG: 40 TABLET ORAL at 08:57

## 2023-04-04 RX ADMIN — AMIODARONE HYDROCHLORIDE 200 MG: 200 TABLET ORAL at 08:57

## 2023-04-04 RX ADMIN — PANTOPRAZOLE SODIUM 40 MG: 40 TABLET, DELAYED RELEASE ORAL at 05:08

## 2023-04-04 RX ADMIN — ATORVASTATIN CALCIUM 80 MG: 80 TABLET, FILM COATED ORAL at 08:57

## 2023-04-04 RX ADMIN — ENOXAPARIN SODIUM 30 MG: 100 INJECTION SUBCUTANEOUS at 08:57

## 2023-04-04 NOTE — PLAN OF CARE
Problem: Discharge Planning  Goal: Discharge to home or other facility with appropriate resources  Outcome: Progressing     Problem: ABCDS Injury Assessment  Goal: Absence of physical injury  Outcome: Progressing     Problem: Safety - Adult  Goal: Free from fall injury  Outcome: Progressing     Problem: Respiratory - Adult  Goal: Achieves optimal ventilation and oxygenation  Outcome: Progressing     Problem: Cardiovascular - Adult  Goal: Maintains optimal cardiac output and hemodynamic stability  Outcome: Progressing     Problem: Metabolic/Fluid and Electrolytes - Adult  Goal: Electrolytes maintained within normal limits  Outcome: Progressing     Problem: Metabolic/Fluid and Electrolytes - Adult  Goal: Hemodynamic stability and optimal renal function maintained  Outcome: Progressing

## 2023-04-04 NOTE — PLAN OF CARE
Problem: Discharge Planning  Goal: Discharge to home or other facility with appropriate resources  4/4/2023 1608 by Kyle Albert RN  Outcome: Progressing  4/4/2023 1122 by Kyle Albert RN  Outcome: Progressing     Problem: ABCDS Injury Assessment  Goal: Absence of physical injury  4/4/2023 1608 by Kyle Albert RN  Outcome: Progressing  4/4/2023 1122 by Kyle Albert RN  Outcome: Progressing     Problem: Safety - Adult  Goal: Free from fall injury  4/4/2023 1608 by Kyle Albert RN  Outcome: Progressing  4/4/2023 1122 by Kyle Albert RN  Outcome: Progressing     Problem: Respiratory - Adult  Goal: Achieves optimal ventilation and oxygenation  4/4/2023 1608 by Kyle Albert RN  Outcome: Progressing  4/4/2023 1122 by Kyle Albert RN  Outcome: Progressing     Problem: Cardiovascular - Adult  Goal: Maintains optimal cardiac output and hemodynamic stability  4/4/2023 1608 by Kyle Albert RN  Outcome: Progressing  4/4/2023 1122 by Kyle Albert RN  Outcome: Progressing     Problem: Metabolic/Fluid and Electrolytes - Adult  Goal: Electrolytes maintained within normal limits  4/4/2023 1608 by Kyle Albert RN  Outcome: Progressing  4/4/2023 1122 by Kyle Albert RN  Outcome: Progressing  Goal: Hemodynamic stability and optimal renal function maintained  4/4/2023 1608 by Kyle Albert RN  Outcome: Progressing  4/4/2023 1122 by Kyle Albert RN  Outcome: Progressing

## 2023-04-04 NOTE — PLAN OF CARE
Problem: Discharge Planning  Goal: Discharge to home or other facility with appropriate resources  4/3/2023 2304 by Abraham Bosch  Outcome: Progressing     Problem: ABCDS Injury Assessment  Goal: Absence of physical injury  4/3/2023 2304 by Abraham Bosch  Outcome: Progressing     Problem: Safety - Adult  Goal: Free from fall injury  4/3/2023 2304 by Abraham Bosch  Outcome: Progressing     Problem: Respiratory - Adult  Goal: Achieves optimal ventilation and oxygenation  4/3/2023 2304 by Abraham Bosch  Outcome: Progressing     Problem: Cardiovascular - Adult  Goal: Maintains optimal cardiac output and hemodynamic stability  4/3/2023 2304 by Abraham Bosch  Outcome: Progressing     Problem: Metabolic/Fluid and Electrolytes - Adult  Goal: Electrolytes maintained within normal limits  4/3/2023 2304 by Abraham Bosch  Outcome: Progressing     Problem: Metabolic/Fluid and Electrolytes - Adult  Goal: Hemodynamic stability and optimal renal function maintained  4/3/2023 2304 by Abraham Bosch  Outcome: Progressing

## 2023-04-04 NOTE — PLAN OF CARE
Problem: Discharge Planning  Goal: Discharge to home or other facility with appropriate resources  4/4/2023 1122 by Thomas Duggan RN  Outcome: Progressing  4/3/2023 2304 by Barrera Goodness  Outcome: Progressing  4/3/2023 2241 by Barrera Goodness  Outcome: Progressing     Problem: ABCDS Injury Assessment  Goal: Absence of physical injury  4/4/2023 1122 by Thomas Duggan RN  Outcome: Progressing  4/3/2023 2304 by Barrera Goodness  Outcome: Progressing  4/3/2023 2241 by Barrera Goodness  Outcome: Progressing     Problem: Safety - Adult  Goal: Free from fall injury  4/4/2023 1122 by Thomas Duggan RN  Outcome: Progressing  4/3/2023 2304 by Barrera Goodness  Outcome: Progressing  4/3/2023 2241 by Barrera Goodness  Outcome: Progressing     Problem: Respiratory - Adult  Goal: Achieves optimal ventilation and oxygenation  4/4/2023 1122 by Thomas Duggan RN  Outcome: Progressing  4/3/2023 2304 by Barrera Goodness  Outcome: Progressing  4/3/2023 2241 by Barrera Goodness  Outcome: Progressing     Problem: Cardiovascular - Adult  Goal: Maintains optimal cardiac output and hemodynamic stability  4/4/2023 1122 by Thomas Duggan RN  Outcome: Progressing  4/3/2023 2304 by Barrera Goodness  Outcome: Progressing  4/3/2023 2241 by Barrera Goodness  Outcome: Progressing     Problem: Metabolic/Fluid and Electrolytes - Adult  Goal: Electrolytes maintained within normal limits  4/4/2023 1122 by Thomas Duggan RN  Outcome: Progressing  4/3/2023 2304 by Barrera Goodness  Outcome: Progressing  4/3/2023 2241 by Barrera Goodness  Outcome: Progressing  Goal: Hemodynamic stability and optimal renal function maintained  4/4/2023 1122 by Thomas Duggan RN  Outcome: Progressing  4/3/2023 2304 by Bruce Goodness  Outcome: Progressing  4/3/2023 2241 by Barrera Goodness  Outcome: Progressing

## 2023-04-04 NOTE — CARE COORDINATION
04/04/23 1052   IMM Letter   IMM Letter given to Patient/Family/Significant other/Guardian/POA/by: Provided to patient at bedside by Anabelle Arce RN. Education provided to patient, patient reported no questions and verbalized understanding. Patient aware of 4 hours allotted time to determine if they choose to pursue Medicare appeal process. Copy left with patient at bedside. No further questions or concerns. Wife at bedside.    IMM Letter date given: 04/04/23   IMM Letter time given: [phone]  Electronically signed by Anabelle Arce RN on 4/4/2023 at 10:53 AM [employer]

## 2023-04-05 ENCOUNTER — TELEPHONE (OUTPATIENT)
Dept: INTERNAL MEDICINE CLINIC | Age: 83
End: 2023-04-05

## 2023-04-05 ENCOUNTER — CARE COORDINATION (OUTPATIENT)
Dept: CASE MANAGEMENT | Age: 83
End: 2023-04-05

## 2023-04-05 DIAGNOSIS — I50.23 ACUTE ON CHRONIC SYSTOLIC (CONGESTIVE) HEART FAILURE (HCC): Primary | ICD-10-CM

## 2023-04-05 PROCEDURE — 1111F DSCHRG MED/CURRENT MED MERGE: CPT

## 2023-04-05 NOTE — CARE COORDINATION
within 7 days of discharge? Yes. Follow Up  Future Appointments   Date Time Provider Jose Figueredo   5/1/2023  8:45 AM Elizabeth Olvera MD [id number] [facility] Transition Nurse provided contact information.     Plan for next call:  SOB - follow up with cardiac    Padmini Yeh RN BSN  Care Transition Nurse  [phone]

## 2023-04-07 NOTE — PROGRESS NOTES
Nutrition Education    Heart Failure Diet Education:    Per hospital protocol or consult, patient being seen for Heart Failure diet guidelines. Learners: Patient [x]Family []Caregiver [] Other: ______________  Methods: [][x]Verbal Education  [x]Handouts  []Teachback     Patient's Lifestyle Questions:   Is HF a new Diagnosis? []Yes [x]No    Has patient had prior education? [x]Yes []No    Who does Grocery shopping and cooking? wife    Frequency of eating out? They don't eat out    Typical Eating Habits:Lite breakfast, sandwich for lunch and cooks from scratch for dinner      Instructed the Patient on:   [x] High/Low Sodium foods    [] Moderation/portion control  [] Eating out  [x] Fluid Restriction    [] Label reading  [] Carb Counting   [] Other:      Educational Materials Provided:   [x] Nutrition Care Manual (NCM) Heart Failure Nutrition Therapy   [x] NCM Sodium (Salt) Content of Foods  [] NCM Sodium Free Flavoring Tips    [] Heart Healthy Eating Label Reading Tips   [] Healthy Eating when Eating Out  [] Controlling Your Fluids   [] [facility] (from [place])  [] NCM Carbohydrate Counting for People with Diabetes   [] Managing Your Diabetes Plate Method     -Diet Recommendations: 2000 mg Sodium/day, 64 oz Fluids/day         Monitoring/Evaluation:   -Barriers: Has had difficulty continuing when changes have been made in the past  -Evaluation of education: Indicates understanding.  -Expected compliance: good. Additional Comments: Both pt and wife agree that this is something that they have to stay with. He does really like salt and his wife has been cooking with salt. Both pt and wife agreeable to change and try other seasoning.      Total time involved in patient education: 15 minutes        Electronically signed by Anastasiia Friedman RD, LD on 4/3/2023 at 2:59 PM             Anastasiia Friedman RD, LD  Contact Number: [phone]
Patient refusing to wear Bipap tonight, despite explaining him about the benefits of its use.
Patient slept well throughout shift, easily aroused for routine checks. One episode of desat to mid [de-identified] while sleeping. Woken up and sats returned to normal. In bed, call light within reach.
Patient states feeling well this evening. Rested well overnight with no complaints. No signs or symptoms of distress.
Patient's HR began to fluctuate from the 50's-60's. MD Julia Garcia notified, asked about administration of coreg and lasix. MD gave OK to hold coreg and gave OK to administer lasix.    Electronically signed by Shanti Huizar RN on 4/2/2023 at 6:53 PM
Physician Progress Note      PATIENT:               Khoi Seay  CSN #:                  [id number]  :                       1940  ADMIT DATE:       2023 6:38 PM  Ligia Borges DATE:        2023 6:26 PM  RESPONDING  PROVIDER #:        Sally Joya MD          QUERY TEXT:    Pt admitted with CHF exacerbation. Pt noted to have tachypnea, resp distress,   hypoxia . If possible, please document in the progress notes and discharge   summary if you are evaluating and/or treating any of the following: The medical record reflects the following:  Risk Factors: chf  Clinical Indicators: On arrival  c/o sudden onset resp distress,  Pt received   a duoneb tx en route and placed on 9 l/min O2 with an SpO2 of 91%. Pt placed   on a nonrebreather and SpO2 up to 95%. Resp labored, with audible wheezing   and crackles. Per ED-He is in mild respiratory distress but speaking to me in   3-4 word sentences. Increased work of breathing. RR 32,  94%hi flow O2. Has   been weaned to room air 4/3. pH-7.209, pCO2-65.7  Treatment: hi flow O2, monitor resp status, monitor o2 sats, wean o2, iv lasix    Thank you, Yakelin Pride RN CDS CRCR CCDS  [email or web address]  Options provided:  -- Acute respiratory failure with hypoxia  -- Acute respiratory failure with hypercapnia  -- Acute respiratory failure with hypoxia and hypercapnia  -- Other - I will add my own diagnosis  -- Disagree - Not applicable / Not valid  -- Disagree - Clinically unable to determine / Unknown  -- Refer to Clinical Documentation Reviewer    PROVIDER RESPONSE TEXT:    This patient is in acute respiratory failure with hypoxia and hypercapnia. Query created by: Diane Pride on 2023 9:29 AM      Electronically signed by:   Sally Joya MD 2023 9:07 AM
Written discharge instructions including diet, activity, weight monitoring, what to do if symptoms worsen and the importance of follow up care and need to call his/her physician for  an appointment were reviewed with the patient who verbalized understanding. These written instructions were given to the patient to take home and a copy was made for the medical record.  Electronically signed by Jett Erickson RN on 4/4/2023 at 6:14 PM
Soft, non-tender, non-distended with normal bowel sounds. Musculoskeletal: No clubbing, cyanosis or edema bilaterally. Full range of motion without deformity. Skin: Skin color, texture, turgor normal.  No rashes or lesions. Extremities: Trace ankle edema  Neurologic:  Neurovascularly intact without any focal sensory/motor deficits. Cranial nerves: II-XII intact, grossly non-focal.  Psychiatric: Alert and oriented, thought content appropriate, normal insight  Capillary Refill: Brisk, 3 seconds, normal   Peripheral Pulses: +2 palpable, equal bilaterally       Labs:   Recent Labs     04/01/23 1858 04/02/23  0507   WBC 10.9 8.5   HGB 15.0 13.5   HCT 44.9 40.2*    132*     Recent Labs     04/01/23 1858 04/02/23  0507    141   K 4.7 4.1    102   CO2 22 24   BUN 29* 33*   CREATININE 1.6* 1.3   CALCIUM 8.6 8.7     No results for input(s): AST, ALT, BILIDIR, BILITOT, ALKPHOS in the last 72 hours. No results for input(s): INR in the last 72 hours. Recent Labs     04/01/23 1858   TROPONINI <0.01       Urinalysis:      Lab Results   Component Value Date/Time    NITRU Negative 09/17/2022 09:55 PM    WBCUA 0 06/09/2021 10:31 PM    RBCUA 1 06/09/2021 10:31 PM    BLOODU Negative 09/17/2022 09:55 PM    SPECGRAV 1.020 09/17/2022 09:55 PM    GLUCOSEU Negative 09/17/2022 09:55 PM       Radiology:  XR CHEST PORTABLE   Final Result   Mild cardiomegaly with pulmonary edema.              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
panel in a.m. DVT Prophylaxis: lovenox  Diet: ADULT DIET; Regular; 5 carb choices (75 gm/meal);  Low Sodium (2 gm); 1500 ml  Code Status: Full Code    PT/OT Eval Status: n/a    Dispo - PCU    Tennille Bell MD
present. Assessment/Plan:    1.) Acute on chronic heart failure, LVEF 45-50%: Hypervolemic on admit D/T dietary indiscretion. NYHA Class III   Stage C  Appears euvolemic today. Creat slightly elevated 1.5. He was on lasix 10mg PTA but was not taking. Will decrease lasix to 20mg daily today and cont spironolactone, coreg. Restart jardiance at DC. Not on ACEi/ARNI PTA - not indicated with LVEF >40%. Unclear why- defer to primary cardiology to start    Cardiac Rehab and ICD not indicated with EF >35%)  2gm Na diet, daily weight, 64 oz fluid restriction  Avoid NSAIDS and other nephrotoxic meds  ECHO pending- if EF suggests ischemia then he will need a LHC.   IF EF OK, then OK for DC and FU with TC.     2.) Bradycardia:   Asymptomatic    3.) Paroxysmal Atrial Fib:   Cont Amio  NO OAC PTA, hospitalist started eliquis, defer to primary cardiologist    Electronically signed by ARACELY Bentley - CNP on 4/4/2023 at 8:29 AM

## 2023-04-14 NOTE — DISCHARGE SUMMARY
Hospital Medicine Discharge Summary    Patient ID: Kevin Delcid      Patient's PCP: Jaci Florentino MD    Admit Date: 4/1/2023     Discharge Date: 4/4/2023      Admitting Physician: Sharyle Senior, MD     Discharge Physician: Sheri Phillips MD     Discharge Diagnoses: Active Hospital Problems    Diagnosis Date Noted    Acute on chronic systolic (congestive) heart failure ([facility] [address] [I50.23] 09/18/2022     Priority: Medium    Chronic renal disease, stage III ([facility] [address] [647821] [N18.30] 06/14/2022     Priority: Medium    Atrial fibrillation ([facility] [address] [I48.91] 04/01/2023    Type 2 diabetes mellitus [E11.9] 02/10/2022    HTN (hypertension) [I10] 10/07/2013    Hyperlipidemia [E78.5] 10/07/2013       The patient was seen and examined on day of discharge and this discharge summary is in conjunction with any daily progress note from day of discharge. Hospital Course:     Acute on chronic combined systolic diastolic heart failure  Patient appears to have had an episode of flash pulmonary edema based on his symptoms and presentation. Repeat Echo 4/4/23 reassuring with mild grade 1 diastolic dysfunction and normal EF  Cardiology consulted  Decreased Lasix soon after admission      Exam:     /61   Pulse 73   Temp 97.8 °F (36.6 °C) (Oral)   Resp 18   Ht 5' 6\" (1.676 m)   Wt 216 lb 4.3 oz (98.1 kg)   SpO2 91%   BMI 34.91 kg/m²     General appearance: No apparent distress, appears stated age and cooperative. HEENT: Pupils equal, round, and reactive to light. Conjunctivae/corneas clear. Neck: Supple, with full range of motion. No jugular venous distention. Trachea midline. Respiratory:  Normal respiratory effort. Clear to auscultation, bilaterally without Rales/Wheezes/Rhonchi. Cardiovascular: Regular rate and rhythm with normal S1/S2 without murmurs, rubs or gallops. Abdomen: Soft, non-tender, non-distended with normal bowel sounds.   Musculoskelatal: No clubbing, cyanosis or edema

## 2023-04-20 ENCOUNTER — CARE COORDINATION (OUTPATIENT)
Dept: CASE MANAGEMENT | Age: 83
End: 2023-04-20

## 2023-04-20 NOTE — CARE COORDINATION
[facility] Care Transitions Follow Up Call       Patient: Star Rudolph  Patient : 1940   MRN: [id number]  Reason for Admission: SOB  Discharge Date: 23 RARS: Readmission Risk Score: 15.5      Needs to be reviewed by the provider   Additional needs identified to be addressed with provider: No               Method of communication with provider: none. Unable to reach patient for CT follow up phone call. Left message. Contact number provided. Requested return call to CTN.     Follow Up  Future Appointments   Date Time Provider Jose Figueredo   2023  8:45 AM MD Dunia Epstein 442 Transitions Subsequent and Final Call    Subsequent and Final Calls  Care Transitions Interventions  Other Interventions:              Moon Barry RN BSN  Care Transition Nurse  [phone]

## 2023-04-27 ENCOUNTER — CARE COORDINATION (OUTPATIENT)
Dept: CASE MANAGEMENT | Age: 83
End: 2023-04-27

## 2023-04-27 NOTE — CARE COORDINATION
[facility] Care Transitions Follow Up Call      Patient: Crys Urban  Patient : 1940   MRN: [id number]  Reason for Admission: SOB  Discharge Date: 23 RARS: Readmission Risk Score: 15.5      Needs to be reviewed by the provider   Additional needs identified to be addressed with provider: No               Method of communication with provider: none. 2nd and final attempt at CT follow up phone call. Unable to reach patient. Left message. Informed Nathen Aracelisbeverly this would be the final CTN outreach. Encouraged him to contact one of his physicians with any future medical issues or concerns.     Follow Up  Future Appointments   Date Time Provider Jose Figueredo   2023  8:45 AM MD Dunia Linares 442 Transitions Subsequent and Final Call    Subsequent and Final Calls  Care Transitions Interventions  Other Interventions:              Jenny Velasquez RN BSN  Care Transition Nurse  [phone]

## 2023-05-01 ENCOUNTER — OFFICE VISIT (OUTPATIENT)
Dept: INTERNAL MEDICINE CLINIC | Age: 83
End: 2023-05-01

## 2023-05-01 VITALS
TEMPERATURE: 98.1 F | HEART RATE: 84 BPM | WEIGHT: 220 LBS | SYSTOLIC BLOOD PRESSURE: 114 MMHG | DIASTOLIC BLOOD PRESSURE: 74 MMHG | OXYGEN SATURATION: 98 % | BODY MASS INDEX: 35.51 KG/M2

## 2023-05-01 DIAGNOSIS — I50.32 CHRONIC DIASTOLIC CHF (CONGESTIVE HEART FAILURE) (HCC): ICD-10-CM

## 2023-05-01 DIAGNOSIS — I48.0 PAROXYSMAL ATRIAL FIBRILLATION (HCC): ICD-10-CM

## 2023-05-01 DIAGNOSIS — D68.69 SECONDARY HYPERCOAGULABLE STATE (HCC): ICD-10-CM

## 2023-05-01 DIAGNOSIS — E11.22 TYPE 2 DIABETES MELLITUS WITH STAGE 3A CHRONIC KIDNEY DISEASE, WITHOUT LONG-TERM CURRENT USE OF INSULIN (HCC): Primary | ICD-10-CM

## 2023-05-01 DIAGNOSIS — N18.31 TYPE 2 DIABETES MELLITUS WITH STAGE 3A CHRONIC KIDNEY DISEASE, WITHOUT LONG-TERM CURRENT USE OF INSULIN (HCC): Primary | ICD-10-CM

## 2023-05-01 DIAGNOSIS — E66.01 SEVERE OBESITY (BMI 35.0-39.9) WITH COMORBIDITY (HCC): ICD-10-CM

## 2023-05-01 DIAGNOSIS — D69.6 THROMBOCYTOPENIA, UNSPECIFIED (HCC): ICD-10-CM

## 2023-05-01 DIAGNOSIS — I10 ESSENTIAL HYPERTENSION: ICD-10-CM

## 2023-05-01 DIAGNOSIS — E78.2 MIXED HYPERLIPIDEMIA: ICD-10-CM

## 2023-05-01 SDOH — ECONOMIC STABILITY: FOOD INSECURITY: WITHIN THE PAST 12 MONTHS, THE FOOD YOU BOUGHT JUST DIDN'T LAST AND YOU DIDN'T HAVE MONEY TO GET MORE.: NEVER TRUE

## 2023-05-01 SDOH — ECONOMIC STABILITY: FOOD INSECURITY: WITHIN THE PAST 12 MONTHS, YOU WORRIED THAT YOUR FOOD WOULD RUN OUT BEFORE YOU GOT MONEY TO BUY MORE.: NEVER TRUE

## 2023-05-01 SDOH — ECONOMIC STABILITY: HOUSING INSECURITY
IN THE LAST 12 MONTHS, WAS THERE A TIME WHEN YOU DID NOT HAVE A STEADY PLACE TO SLEEP OR SLEPT IN A SHELTER (INCLUDING NOW)?: NO

## 2023-05-01 SDOH — ECONOMIC STABILITY: INCOME INSECURITY: HOW HARD IS IT FOR YOU TO PAY FOR THE VERY BASICS LIKE FOOD, HOUSING, MEDICAL CARE, AND HEATING?: NOT HARD AT ALL

## 2023-05-01 ASSESSMENT — ENCOUNTER SYMPTOMS
BLOOD IN STOOL: 0
NAUSEA: 0
SHORTNESS OF BREATH: 0
SORE THROAT: 0
COUGH: 0
ABDOMINAL PAIN: 0
VOMITING: 0

## 2023-05-01 NOTE — PROGRESS NOTES
Nuria Salmon (:  1940) is a [de-identified] y.o. male, Established patient, here for evaluation of the following chief complaint(s):  3 Month Follow-Up and Diabetes         ASSESSMENT/PLAN:  1. 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 Essential hypertension  - Stable   - Continue current dose of carvedilol, furosemide and Aldactone    3. Severe obesity (BMI 35.0-39. 9) with comorbidity ([address]  - Stable   - Continue lifestyle modifications with regular exercise and diabetic diet. 4. Mixed hyperlipidemia  - Stable   - Continue current dose of Atorvastatin    5. Chronic diastolic CHF (congestive heart failure) (HCC)  - Stable   -Follows with cardiology  - Continue current dose of carvedilol, Aldactone, furosemide, Jardiance. Medications managed by his cardiologist    6. Paroxysmal atrial fibrillation (HCC)  - Stable   Continue current dose of carvedilol, amiodarone and Eliquis  Follows with cardiology. Medications managed by his cardiologist    7. Thrombocytopenia, unspecified ([facility] [address]  Stable. Continue monitoring blood count. Patient follows with cardiology regularly for the management of chronic congestive heart failure and paroxysmal atrial fibrillation. Return in about 3 months (around 2023). Subjective   SUBJECTIVE/OBJECTIVE:  Hypertension  This is a chronic problem. The current episode started more than 1 year ago. The problem is controlled. Pertinent negatives include no chest pain, palpitations or shortness of breath. Risk factors for coronary artery disease include dyslipidemia and male gender. Past treatments include diuretics and beta blockers. The current treatment provides significant improvement. There are no compliance problems. Hyperlipidemia  This is a chronic problem. The current episode started more than 1 year ago.  The problem is

## 2023-06-08 DIAGNOSIS — N18.31 TYPE 2 DIABETES MELLITUS WITH STAGE 3A CHRONIC KIDNEY DISEASE, WITHOUT LONG-TERM CURRENT USE OF INSULIN (HCC): ICD-10-CM

## 2023-06-08 DIAGNOSIS — E11.22 TYPE 2 DIABETES MELLITUS WITH STAGE 3A CHRONIC KIDNEY DISEASE, WITHOUT LONG-TERM CURRENT USE OF INSULIN (HCC): ICD-10-CM

## 2023-06-09 NOTE — TELEPHONE ENCOUNTER
Last office visit 5/1/23        Future Appointments   Date Time Provider Jose Figueredo   8/28/2023  7:30 AM Emily Adams MD [facility]

## 2023-07-17 DIAGNOSIS — N18.31 TYPE 2 DIABETES MELLITUS WITH STAGE 3A CHRONIC KIDNEY DISEASE, WITHOUT LONG-TERM CURRENT USE OF INSULIN (HCC): ICD-10-CM

## 2023-07-17 DIAGNOSIS — E11.22 TYPE 2 DIABETES MELLITUS WITH STAGE 3A CHRONIC KIDNEY DISEASE, WITHOUT LONG-TERM CURRENT USE OF INSULIN (HCC): ICD-10-CM

## 2023-07-17 RX ORDER — CALCIUM CITRATE/VITAMIN D3 200MG-6.25
TABLET ORAL
Qty: 100 STRIP | Refills: 5 | Status: SHIPPED | OUTPATIENT
Start: 2023-07-17

## 2023-07-17 NOTE — TELEPHONE ENCOUNTER
Future Appointments   Date Time Provider [address]   8/29/2023  7:45 AM May Page, [address]  [place] - [place]     Last appt on 5. 1.2023

## 2023-08-20 DIAGNOSIS — E78.2 MIXED HYPERLIPIDEMIA: ICD-10-CM

## 2023-08-20 DIAGNOSIS — I10 ESSENTIAL HYPERTENSION: ICD-10-CM

## 2023-08-21 RX ORDER — ATORVASTATIN CALCIUM 80 MG/1
TABLET, FILM COATED ORAL
Qty: 90 TABLET | Refills: 0 | Status: SHIPPED | OUTPATIENT
Start: 2023-08-21

## 2023-08-21 RX ORDER — CARVEDILOL 25 MG/1
TABLET ORAL
Qty: 180 TABLET | Refills: 0 | Status: SHIPPED | OUTPATIENT
Start: 2023-08-21

## 2023-08-21 RX ORDER — SPIRONOLACTONE 25 MG/1
TABLET ORAL
Qty: 90 TABLET | Refills: 0 | Status: SHIPPED | OUTPATIENT
Start: 2023-08-21

## 2023-08-25 RX ORDER — FUROSEMIDE 20 MG/1
20 TABLET ORAL DAILY
Qty: 60 TABLET | Refills: 0 | Status: SHIPPED | OUTPATIENT
Start: 2023-08-25

## 2023-08-25 NOTE — TELEPHONE ENCOUNTER
Future Appointments   Date Time Provider [address]   8/29/2023  7:45 AM Leta Crawford, [address] [place]     Last appt on 5. 1.2023

## 2023-08-27 NOTE — PROGRESS NOTES
POC Glucose 04/04/2023 146 (H)  70 - 99 mg/dl Final    Performed on 04/04/2023 ACCU-CHEK   Final    POC Glucose 04/04/2023 149 (H)  70 - 99 mg/dl Final    Performed on 04/04/2023 ACCU-CHEK   Final       Family History   Problem Relation Age of Onset    Cancer Father         prostate    Diabetes Sister        Current Outpatient Medications   Medication Sig Dispense Refill    furosemide (LASIX) 20 MG tablet Take 1 tablet by mouth daily 60 tablet 0    atorvastatin (LIPITOR) 80 MG tablet TAKE 1 TABLET EVERY DAY 90 tablet 0    carvedilol (COREG) 25 MG tablet TAKE 1 TABLET TWICE DAILY WITH MEALS 180 tablet 0    spironolactone (ALDACTONE) 25 MG tablet TAKE 1 TABLET EVERY DAY 90 tablet 0    blood glucose test strips (TRUE METRIX BLOOD GLUCOSE TEST) strip TEST 1 TIMES A DAY & AS NEEDED FOR SYMPTOMS OF IRREGULAR BLOOD GLUCOSE. DISPENSE SUFFICIENT AMOUNT FOR INDICATED TESTING FREQUENCY PLUS ADDITIONAL TO ACCOMMODATE PRN TESTING NEEDS. 100 strip 5    meclizine (ANTIVERT) 25 MG tablet TAKE 1 TABLET THREE TIMES DAILY AS NEEDED FOR  DIZZINESS 30 tablet 0    metFORMIN (GLUCOPHAGE) 500 MG tablet TAKE 1 TABLET BY MOUTH DAILY (WITH BREAKFAST) 90 tablet 0    apixaban (ELIQUIS) 5 MG TABS tablet Take by mouth 2 times daily      blood glucose monitor kit and supplies Dispense sufficient amount for indicated testing frequency plus additional to accommodate PRN testing needs. Dispense all needed supplies to include: monitor, strips, lancing device, lancets, control solutions, alcohol swabs.  1 kit 0    Lancets MISC 1 each by Does not apply route daily 100 each 1    amiodarone (CORDARONE) 200 MG tablet TAKE 1 TABLET EVERY DAY 30 tablet 1    empagliflozin (JARDIANCE) 10 MG tablet Take 1 tablet by mouth daily 30 tablet 3    diclofenac sodium (VOLTAREN) 1 % GEL Apply 4 g topically 4 times daily as needed for Pain 100 g 3    omeprazole (PRILOSEC) 20 MG delayed release capsule Take 1 capsule by mouth daily      aspirin EC 81 MG EC tablet Take 1

## 2023-08-29 ENCOUNTER — OFFICE VISIT (OUTPATIENT)
Dept: INTERNAL MEDICINE CLINIC | Age: 83
End: 2023-08-29
Payer: MEDICARE

## 2023-08-29 VITALS
BODY MASS INDEX: 35.11 KG/M2 | HEART RATE: 76 BPM | DIASTOLIC BLOOD PRESSURE: 60 MMHG | SYSTOLIC BLOOD PRESSURE: 100 MMHG | OXYGEN SATURATION: 96 % | WEIGHT: 217.5 LBS | TEMPERATURE: 98.4 F

## 2023-08-29 DIAGNOSIS — I10 PRIMARY HYPERTENSION: ICD-10-CM

## 2023-08-29 DIAGNOSIS — I48.91 ATRIAL FIBRILLATION, UNSPECIFIED TYPE (HCC): ICD-10-CM

## 2023-08-29 DIAGNOSIS — I50.32 CHRONIC DIASTOLIC CHF (CONGESTIVE HEART FAILURE) (HCC): ICD-10-CM

## 2023-08-29 DIAGNOSIS — E78.2 MIXED HYPERLIPIDEMIA: ICD-10-CM

## 2023-08-29 DIAGNOSIS — E11.22 TYPE 2 DIABETES MELLITUS WITH STAGE 3A CHRONIC KIDNEY DISEASE, WITHOUT LONG-TERM CURRENT USE OF INSULIN (HCC): Primary | ICD-10-CM

## 2023-08-29 DIAGNOSIS — N18.31 TYPE 2 DIABETES MELLITUS WITH STAGE 3A CHRONIC KIDNEY DISEASE, WITHOUT LONG-TERM CURRENT USE OF INSULIN (HCC): Primary | ICD-10-CM

## 2023-08-29 LAB — HBA1C MFR BLD: 6.3 %

## 2023-08-29 PROCEDURE — 1123F ACP DISCUSS/DSCN MKR DOCD: CPT | Performed by: NURSE PRACTITIONER

## 2023-08-29 PROCEDURE — 3078F DIAST BP <80 MM HG: CPT | Performed by: NURSE PRACTITIONER

## 2023-08-29 PROCEDURE — 99214 OFFICE O/P EST MOD 30 MIN: CPT | Performed by: NURSE PRACTITIONER

## 2023-08-29 PROCEDURE — 83036 HEMOGLOBIN GLYCOSYLATED A1C: CPT | Performed by: NURSE PRACTITIONER

## 2023-08-29 PROCEDURE — G8427 DOCREV CUR MEDS BY ELIG CLIN: HCPCS | Performed by: NURSE PRACTITIONER

## 2023-08-29 PROCEDURE — 1036F TOBACCO NON-USER: CPT | Performed by: NURSE PRACTITIONER

## 2023-08-29 PROCEDURE — G8417 CALC BMI ABV UP PARAM F/U: HCPCS | Performed by: NURSE PRACTITIONER

## 2023-08-29 PROCEDURE — 3044F HG A1C LEVEL LT 7.0%: CPT | Performed by: NURSE PRACTITIONER

## 2023-08-29 PROCEDURE — 3074F SYST BP LT 130 MM HG: CPT | Performed by: NURSE PRACTITIONER

## 2023-08-29 RX ORDER — SPIRONOLACTONE 25 MG/1
25 TABLET ORAL DAILY
Qty: 90 TABLET | Refills: 0 | Status: CANCELLED | OUTPATIENT
Start: 2023-08-29

## 2023-08-29 RX ORDER — ATORVASTATIN CALCIUM 80 MG/1
80 TABLET, FILM COATED ORAL DAILY
Qty: 90 TABLET | Refills: 0 | Status: CANCELLED | OUTPATIENT
Start: 2023-08-29

## 2023-08-29 ASSESSMENT — ENCOUNTER SYMPTOMS: SHORTNESS OF BREATH: 0

## 2024-01-12 DIAGNOSIS — I10 ESSENTIAL HYPERTENSION: ICD-10-CM

## 2024-01-12 DIAGNOSIS — E11.22 TYPE 2 DIABETES MELLITUS WITH STAGE 3A CHRONIC KIDNEY DISEASE, WITHOUT LONG-TERM CURRENT USE OF INSULIN (HCC): ICD-10-CM

## 2024-01-12 DIAGNOSIS — N18.31 TYPE 2 DIABETES MELLITUS WITH STAGE 3A CHRONIC KIDNEY DISEASE, WITHOUT LONG-TERM CURRENT USE OF INSULIN (HCC): ICD-10-CM

## 2024-01-12 DIAGNOSIS — E78.2 MIXED HYPERLIPIDEMIA: ICD-10-CM

## 2024-01-12 RX ORDER — CARVEDILOL 25 MG/1
TABLET ORAL
Qty: 180 TABLET | Refills: 3 | Status: SHIPPED | OUTPATIENT
Start: 2024-01-12

## 2024-01-12 RX ORDER — FUROSEMIDE 20 MG/1
20 TABLET ORAL DAILY
Qty: 60 TABLET | Refills: 3 | Status: SHIPPED | OUTPATIENT
Start: 2024-01-12

## 2024-01-12 RX ORDER — SPIRONOLACTONE 25 MG/1
TABLET ORAL
Qty: 90 TABLET | Refills: 3 | Status: SHIPPED | OUTPATIENT
Start: 2024-01-12

## 2024-01-12 RX ORDER — ATORVASTATIN CALCIUM 80 MG/1
TABLET, FILM COATED ORAL
Qty: 90 TABLET | Refills: 3 | Status: SHIPPED | OUTPATIENT
Start: 2024-01-12

## 2024-01-12 NOTE — TELEPHONE ENCOUNTER
Future Appointments   Date Time Provider Department Center   3/19/2024  8:00 AM Corinne Escamilla MD [facility] [place] - [place]         Last appt 12/19/23

## 2024-01-12 NOTE — TELEPHONE ENCOUNTER
Future Appointments   Date Time Provider Department Center   3/19/2024  8:00 AM Corinne Escamilla MD [facility] [place] - [place]         Last appt 12/19/23

## 2024-01-12 NOTE — TELEPHONE ENCOUNTER
Future Appointments   Date Time Provider Department Center   3/19/2024  8:00 AM Corinne Escamilla MD [facility] [place] - [place]         Last appt 12/19/23

## 2024-03-22 ASSESSMENT — PATIENT HEALTH QUESTIONNAIRE - PHQ9
SUM OF ALL RESPONSES TO PHQ QUESTIONS 1-9: 0
SUM OF ALL RESPONSES TO PHQ9 QUESTIONS 1 & 2: 0
2. FEELING DOWN, DEPRESSED OR HOPELESS: NOT AT ALL
1. LITTLE INTEREST OR PLEASURE IN DOING THINGS: NOT AT ALL
SUM OF ALL RESPONSES TO PHQ9 QUESTIONS 1 & 2: 0
SUM OF ALL RESPONSES TO PHQ QUESTIONS 1-9: 0
2. FEELING DOWN, DEPRESSED OR HOPELESS: NOT AT ALL
SUM OF ALL RESPONSES TO PHQ QUESTIONS 1-9: 0
SUM OF ALL RESPONSES TO PHQ QUESTIONS 1-9: 0
1. LITTLE INTEREST OR PLEASURE IN DOING THINGS: NOT AT ALL

## 2024-03-25 ENCOUNTER — OFFICE VISIT (OUTPATIENT)
Dept: INTERNAL MEDICINE CLINIC | Age: 84
End: 2024-03-25
Payer: MEDICARE

## 2024-03-25 VITALS
BODY MASS INDEX: 35.55 KG/M2 | OXYGEN SATURATION: 97 % | DIASTOLIC BLOOD PRESSURE: 74 MMHG | TEMPERATURE: 98.7 F | WEIGHT: 220.25 LBS | HEART RATE: 72 BPM | SYSTOLIC BLOOD PRESSURE: 118 MMHG

## 2024-03-25 DIAGNOSIS — E11.22 TYPE 2 DIABETES MELLITUS WITH STAGE 3A CHRONIC KIDNEY DISEASE, WITHOUT LONG-TERM CURRENT USE OF INSULIN (HCC): Primary | ICD-10-CM

## 2024-03-25 DIAGNOSIS — D69.6 THROMBOCYTOPENIA, UNSPECIFIED (HCC): ICD-10-CM

## 2024-03-25 DIAGNOSIS — E66.01 SEVERE OBESITY (BMI 35.0-39.9) WITH COMORBIDITY (HCC): ICD-10-CM

## 2024-03-25 DIAGNOSIS — I48.0 PAROXYSMAL ATRIAL FIBRILLATION (HCC): ICD-10-CM

## 2024-03-25 DIAGNOSIS — N18.31 TYPE 2 DIABETES MELLITUS WITH STAGE 3A CHRONIC KIDNEY DISEASE, WITHOUT LONG-TERM CURRENT USE OF INSULIN (HCC): Primary | ICD-10-CM

## 2024-03-25 DIAGNOSIS — I50.32 CHRONIC DIASTOLIC CHF (CONGESTIVE HEART FAILURE) (HCC): ICD-10-CM

## 2024-03-25 DIAGNOSIS — E78.2 MIXED HYPERLIPIDEMIA: ICD-10-CM

## 2024-03-25 DIAGNOSIS — I10 ESSENTIAL HYPERTENSION: ICD-10-CM

## 2024-03-25 LAB
BASOPHILS # BLD: 0 K/UL (ref 0–0.2)
BASOPHILS NFR BLD: 0.6 %
DEPRECATED RDW RBC AUTO: 13.9 % (ref 12.4–15.4)
EOSINOPHIL # BLD: 0.2 K/UL (ref 0–0.6)
EOSINOPHIL NFR BLD: 2.9 %
EST. AVERAGE GLUCOSE BLD GHB EST-MCNC: 128.4 MG/DL
HBA1C MFR BLD: 6.1 %
HCT VFR BLD AUTO: 41.7 % (ref 40.5–52.5)
HGB BLD-MCNC: 14.2 G/DL (ref 13.5–17.5)
LYMPHOCYTES # BLD: 1.7 K/UL (ref 1–5.1)
LYMPHOCYTES NFR BLD: 27.3 %
MCH RBC QN AUTO: 31.6 PG (ref 26–34)
MCHC RBC AUTO-ENTMCNC: 34 G/DL (ref 31–36)
MCV RBC AUTO: 92.7 FL (ref 80–100)
MONOCYTES # BLD: 1 K/UL (ref 0–1.3)
MONOCYTES NFR BLD: 15.6 %
NEUTROPHILS # BLD: 3.4 K/UL (ref 1.7–7.7)
NEUTROPHILS NFR BLD: 53.6 %
PLATELET # BLD AUTO: 136 K/UL (ref 135–450)
PMV BLD AUTO: 10.2 FL (ref 5–10.5)
RBC # BLD AUTO: 4.5 M/UL (ref 4.2–5.9)
WBC # BLD AUTO: 6.3 K/UL (ref 4–11)

## 2024-03-25 PROCEDURE — G8417 CALC BMI ABV UP PARAM F/U: HCPCS | Performed by: INTERNAL MEDICINE

## 2024-03-25 PROCEDURE — G8427 DOCREV CUR MEDS BY ELIG CLIN: HCPCS | Performed by: INTERNAL MEDICINE

## 2024-03-25 PROCEDURE — G8484 FLU IMMUNIZE NO ADMIN: HCPCS | Performed by: INTERNAL MEDICINE

## 2024-03-25 PROCEDURE — 36415 COLL VENOUS BLD VENIPUNCTURE: CPT | Performed by: INTERNAL MEDICINE

## 2024-03-25 PROCEDURE — 3078F DIAST BP <80 MM HG: CPT | Performed by: INTERNAL MEDICINE

## 2024-03-25 PROCEDURE — 1123F ACP DISCUSS/DSCN MKR DOCD: CPT | Performed by: INTERNAL MEDICINE

## 2024-03-25 PROCEDURE — 3074F SYST BP LT 130 MM HG: CPT | Performed by: INTERNAL MEDICINE

## 2024-03-25 PROCEDURE — 99214 OFFICE O/P EST MOD 30 MIN: CPT | Performed by: INTERNAL MEDICINE

## 2024-03-25 PROCEDURE — 1036F TOBACCO NON-USER: CPT | Performed by: INTERNAL MEDICINE

## 2024-03-25 ASSESSMENT — ENCOUNTER SYMPTOMS
COUGH: 0
VOMITING: 0
SHORTNESS OF BREATH: 0
NAUSEA: 0
SORE THROAT: 0
ABDOMINAL PAIN: 0
BLOOD IN STOOL: 0

## 2024-03-25 NOTE — PROGRESS NOTES
Vincenzo Abraham (:  1940) is a 83 y.o. male, Established patient, here for evaluation of the following chief complaint(s):  Diabetes, Hypertension, and Hyperlipidemia         ASSESSMENT/PLAN:  1. Type 2 diabetes mellitus with stage 3a chronic kidney disease, without long-term current use of insulin (HCC)  - Stable   - Continue current dose of metformin and Jardiance   -     Hemoglobin A1C  -      DIABETES FOOT EXAM    2. Essential hypertension  - Stable   - Continue current dose of carvedilol, furosemide and Aldactone    3. Severe obesity (BMI 35.0-39.9) with comorbidity (HCC)  - Stable   - Continue lifestyle modifications with regular exercise and diabetic diet.    4. Mixed hyperlipidemia  - Stable   - Continue current dose of Atorvastatin    5. Chronic diastolic CHF (congestive heart failure) ([facility])  - Stable   -Follows with cardiology  - Continue current dose of carvedilol, Aldactone, furosemide, Jardiance.  Medications managed by his cardiologist    6. Paroxysmal atrial fibrillation ([facility])  - Stable   Continue current dose of carvedilol, amiodarone and Eliquis  Follows with cardiology.  Medications managed by his cardiologist    7. Thrombocytopenia, unspecified (HCC)  Stable.  Continue monitoring blood count.  -     CBC with Auto Differential      Patient follows with cardiology regularly for the management of chronic congestive heart failure and paroxysmal atrial fibrillation.    Return in about 3 months (around 2024).         Subjective   SUBJECTIVE/OBJECTIVE:  Hypertension  This is a chronic problem. The current episode started more than 1 year ago. The problem is controlled. Pertinent negatives include no chest pain, palpitations or shortness of breath. Risk factors for coronary artery disease include dyslipidemia and male gender. Past treatments include diuretics and beta blockers. The current treatment provides significant improvement. There are no compliance problems.

## 2024-06-13 RX ORDER — FUROSEMIDE 20 MG/1
20 TABLET ORAL DAILY
Qty: 90 TABLET | Refills: 3 | Status: SHIPPED | OUTPATIENT
Start: 2024-06-13

## 2024-06-13 NOTE — TELEPHONE ENCOUNTER
Future Appointments   Date Time Provider Department Center   6/24/2024  8:00 AM Corinne Escamilla MD [facility] [place] - [place]       Last appt 3/25/24

## 2024-06-24 ENCOUNTER — OFFICE VISIT (OUTPATIENT)
Dept: INTERNAL MEDICINE CLINIC | Age: 84
End: 2024-06-24
Payer: MEDICARE

## 2024-06-24 VITALS
HEIGHT: 66 IN | BODY MASS INDEX: 35.36 KG/M2 | DIASTOLIC BLOOD PRESSURE: 58 MMHG | SYSTOLIC BLOOD PRESSURE: 106 MMHG | OXYGEN SATURATION: 96 % | HEART RATE: 68 BPM | WEIGHT: 220 LBS

## 2024-06-24 DIAGNOSIS — N18.31 TYPE 2 DIABETES MELLITUS WITH STAGE 3A CHRONIC KIDNEY DISEASE, WITHOUT LONG-TERM CURRENT USE OF INSULIN (HCC): Primary | ICD-10-CM

## 2024-06-24 DIAGNOSIS — E78.2 MIXED HYPERLIPIDEMIA: ICD-10-CM

## 2024-06-24 DIAGNOSIS — I48.0 PAROXYSMAL ATRIAL FIBRILLATION (HCC): ICD-10-CM

## 2024-06-24 DIAGNOSIS — I10 ESSENTIAL HYPERTENSION: ICD-10-CM

## 2024-06-24 DIAGNOSIS — E66.01 SEVERE OBESITY (BMI 35.0-39.9) WITH COMORBIDITY (HCC): ICD-10-CM

## 2024-06-24 DIAGNOSIS — E11.22 TYPE 2 DIABETES MELLITUS WITH STAGE 3A CHRONIC KIDNEY DISEASE, WITHOUT LONG-TERM CURRENT USE OF INSULIN (HCC): Primary | ICD-10-CM

## 2024-06-24 DIAGNOSIS — D69.6 THROMBOCYTOPENIA, UNSPECIFIED (HCC): ICD-10-CM

## 2024-06-24 DIAGNOSIS — I50.32 CHRONIC DIASTOLIC CHF (CONGESTIVE HEART FAILURE) (HCC): ICD-10-CM

## 2024-06-24 LAB — HBA1C MFR BLD: 6.1 %

## 2024-06-24 PROCEDURE — 1123F ACP DISCUSS/DSCN MKR DOCD: CPT | Performed by: INTERNAL MEDICINE

## 2024-06-24 PROCEDURE — G8427 DOCREV CUR MEDS BY ELIG CLIN: HCPCS | Performed by: INTERNAL MEDICINE

## 2024-06-24 PROCEDURE — 83036 HEMOGLOBIN GLYCOSYLATED A1C: CPT | Performed by: INTERNAL MEDICINE

## 2024-06-24 PROCEDURE — 3044F HG A1C LEVEL LT 7.0%: CPT | Performed by: INTERNAL MEDICINE

## 2024-06-24 PROCEDURE — G2211 COMPLEX E/M VISIT ADD ON: HCPCS | Performed by: INTERNAL MEDICINE

## 2024-06-24 PROCEDURE — 3078F DIAST BP <80 MM HG: CPT | Performed by: INTERNAL MEDICINE

## 2024-06-24 PROCEDURE — 3074F SYST BP LT 130 MM HG: CPT | Performed by: INTERNAL MEDICINE

## 2024-06-24 PROCEDURE — 99214 OFFICE O/P EST MOD 30 MIN: CPT | Performed by: INTERNAL MEDICINE

## 2024-06-24 PROCEDURE — G8417 CALC BMI ABV UP PARAM F/U: HCPCS | Performed by: INTERNAL MEDICINE

## 2024-06-24 PROCEDURE — 1036F TOBACCO NON-USER: CPT | Performed by: INTERNAL MEDICINE

## 2024-06-24 SDOH — ECONOMIC STABILITY: FOOD INSECURITY: WITHIN THE PAST 12 MONTHS, YOU WORRIED THAT YOUR FOOD WOULD RUN OUT BEFORE YOU GOT MONEY TO BUY MORE.: NEVER TRUE

## 2024-06-24 SDOH — ECONOMIC STABILITY: FOOD INSECURITY: WITHIN THE PAST 12 MONTHS, THE FOOD YOU BOUGHT JUST DIDN'T LAST AND YOU DIDN'T HAVE MONEY TO GET MORE.: NEVER TRUE

## 2024-06-24 SDOH — ECONOMIC STABILITY: INCOME INSECURITY: HOW HARD IS IT FOR YOU TO PAY FOR THE VERY BASICS LIKE FOOD, HOUSING, MEDICAL CARE, AND HEATING?: NOT HARD AT ALL

## 2024-06-24 ASSESSMENT — ENCOUNTER SYMPTOMS
NAUSEA: 0
BLOOD IN STOOL: 0
VOMITING: 0
COUGH: 0
SHORTNESS OF BREATH: 0
SORE THROAT: 0
ABDOMINAL PAIN: 0

## 2024-06-24 NOTE — PROGRESS NOTES
Vincenzo Abraham (:  1940) is a 83 y.o. male, Established patient, here for evaluation of the following chief complaint(s):  3 Month Follow-Up and Diabetes      Assessment & Plan   ASSESSMENT/PLAN:  1. Type 2 diabetes mellitus with stage 3a chronic kidney disease, without long-term current use of insulin (HCC)  - Stable   - Continue current dose of metformin and Jardiance   -     Hemoglobin A1C    2. Essential hypertension  - Stable   - Continue current dose of carvedilol, furosemide and Aldactone    3. Severe obesity (BMI 35.0-39.9) with comorbidity (HCC)  - Stable   - Continue lifestyle modifications with regular exercise and diabetic diet.    4. Mixed hyperlipidemia  - Stable   - Continue current dose of Atorvastatin    5. Chronic diastolic CHF (congestive heart failure) (HCC)  - Stable   -Follows with cardiology  - Continue current dose of carvedilol, Aldactone, furosemide, Jardiance.  Medications managed by his cardiologist    6. Paroxysmal atrial fibrillation (HCC)  - Stable   Continue current dose of carvedilol, amiodarone and Eliquis  Follows with cardiology.  Medications managed by his cardiologist    7. Thrombocytopenia, unspecified (HCC)  Improved  Continue monitoring blood count.    Patient follows with cardiology regularly for the management of chronic congestive heart failure and paroxysmal atrial fibrillation.    Return in about 3 months (around 2024).         Subjective   SUBJECTIVE/OBJECTIVE:  Hypertension  This is a chronic problem. The current episode started more than 1 year ago. The problem is controlled. Pertinent negatives include no chest pain, palpitations or shortness of breath. Risk factors for coronary artery disease include dyslipidemia and male gender. Past treatments include diuretics and beta blockers. The current treatment provides significant improvement. There are no compliance problems.    Hyperlipidemia  This is a chronic problem. The current episode started more

## 2024-10-22 ASSESSMENT — ENCOUNTER SYMPTOMS
ABDOMINAL PAIN: 0
NAUSEA: 0
BLOOD IN STOOL: 0
COUGH: 0
SHORTNESS OF BREATH: 0
SORE THROAT: 0
VOMITING: 0

## 2024-10-22 NOTE — PROGRESS NOTES
Vincenzo Abraham (:  1940) is a 83 y.o. male, Established patient, here for evaluation of the following chief complaint(s):  new to provider and Diabetes    Here to establish care.    Past medical history significant for diabetes, hypertension, hyperlipidemia, congestive diastolic heart failure, paroxysmal atrial fibrillation, thrombocytopenia    --- Follows cards- atrial fibrillation s/p ablation.    Assessment & Plan   Assessment & Plan  Type 2 diabetes mellitus with stage 3a chronic kidney disease, without long-term current use of insulin (HCC)   Chronic, at goal (stable), continue current treatment plan,  continue with jardiance and metformin.    Orders:    Lipid, Fasting; Future    Lipid, Fasting    Paroxysmal atrial fibrillation (HCC)   Monitored by specialist- no acute findings meriting change in the plan    Orders:    Comprehensive Metabolic Panel; Future    TSH with Reflex; Future    CBC with Auto Differential; Future    Lipid, Fasting; Future    Lipid, Fasting    CBC with Auto Differential    TSH with Reflex    Comprehensive Metabolic Panel    Thrombocytopenia, unspecified (HCC)   Chronic, at goal (stable), continue current plan pending work up below    Orders:    CBC with Auto Differential; Future    CBC with Auto Differential    Screening PSA (prostate specific antigen)    Labs ordered    Orders:    PSA Screening; Future    PSA Screening    Encounter for immunization    Provided at the office.    Orders:    Influenza, FLUAD Trivalent, (age 65 y+), IM, Preservative Free, 0.5mL           Return in about 3 months (around 2025) for Diabetes, HTN.         Subjective   SUBJECTIVE/OBJECTIVE:  Hypertension  This is a chronic problem. The current episode started more than 1 year ago. The problem is controlled. Pertinent negatives include no chest pain, palpitations or shortness of breath. Risk factors for coronary artery disease include dyslipidemia and male gender. Past treatments include

## 2024-10-23 ENCOUNTER — OFFICE VISIT (OUTPATIENT)
Dept: INTERNAL MEDICINE CLINIC | Age: 84
End: 2024-10-23

## 2024-10-23 VITALS
WEIGHT: 219 LBS | SYSTOLIC BLOOD PRESSURE: 124 MMHG | BODY MASS INDEX: 35.36 KG/M2 | HEART RATE: 67 BPM | OXYGEN SATURATION: 98 % | DIASTOLIC BLOOD PRESSURE: 70 MMHG

## 2024-10-23 DIAGNOSIS — Z12.5 SCREENING PSA (PROSTATE SPECIFIC ANTIGEN): ICD-10-CM

## 2024-10-23 DIAGNOSIS — Z23 ENCOUNTER FOR IMMUNIZATION: ICD-10-CM

## 2024-10-23 DIAGNOSIS — E11.22 TYPE 2 DIABETES MELLITUS WITH STAGE 3A CHRONIC KIDNEY DISEASE, WITHOUT LONG-TERM CURRENT USE OF INSULIN (HCC): Primary | ICD-10-CM

## 2024-10-23 DIAGNOSIS — D69.6 THROMBOCYTOPENIA, UNSPECIFIED (HCC): ICD-10-CM

## 2024-10-23 DIAGNOSIS — I48.0 PAROXYSMAL ATRIAL FIBRILLATION (HCC): ICD-10-CM

## 2024-10-23 DIAGNOSIS — N18.31 TYPE 2 DIABETES MELLITUS WITH STAGE 3A CHRONIC KIDNEY DISEASE, WITHOUT LONG-TERM CURRENT USE OF INSULIN (HCC): Primary | ICD-10-CM

## 2024-10-23 PROBLEM — I50.23 ACUTE ON CHRONIC SYSTOLIC (CONGESTIVE) HEART FAILURE (HCC): Status: RESOLVED | Noted: 2022-09-18 | Resolved: 2024-10-23

## 2024-10-23 PROBLEM — J96.00 ACUTE RESPIRATORY FAILURE: Status: RESOLVED | Noted: 2022-09-18 | Resolved: 2024-10-23

## 2024-10-23 LAB — HBA1C MFR BLD: 6.1 %

## 2024-10-23 NOTE — ASSESSMENT & PLAN NOTE
Monitored by specialist- no acute findings meriting change in the plan    Orders:    Comprehensive Metabolic Panel; Future    TSH with Reflex; Future    CBC with Auto Differential; Future    Lipid, Fasting; Future    Lipid, Fasting    CBC with Auto Differential    TSH with Reflex    Comprehensive Metabolic Panel

## 2024-10-24 DIAGNOSIS — E03.9 ACQUIRED HYPOTHYROIDISM: Primary | ICD-10-CM

## 2024-10-24 DIAGNOSIS — R97.20 ELEVATED PROSTATE SPECIFIC ANTIGEN (PSA): ICD-10-CM

## 2024-10-24 DIAGNOSIS — Z12.5 PROSTATE CANCER SCREENING: ICD-10-CM

## 2024-10-24 LAB
ALBUMIN SERPL-MCNC: 3.8 G/DL (ref 3.4–5)
ALBUMIN/GLOB SERPL: 1.7 {RATIO} (ref 1.1–2.2)
ALP SERPL-CCNC: 63 U/L (ref 40–129)
ALT SERPL-CCNC: 42 U/L (ref 10–40)
ANION GAP SERPL CALCULATED.3IONS-SCNC: 13 MMOL/L (ref 3–16)
AST SERPL-CCNC: 32 U/L (ref 15–37)
BASOPHILS # BLD: 0 K/UL (ref 0–0.2)
BASOPHILS NFR BLD: 0.7 %
BILIRUB SERPL-MCNC: 0.5 MG/DL (ref 0–1)
BUN SERPL-MCNC: 32 MG/DL (ref 7–20)
CALCIUM SERPL-MCNC: 8.9 MG/DL (ref 8.3–10.6)
CHLORIDE SERPL-SCNC: 105 MMOL/L (ref 99–110)
CHOLEST SERPL-MCNC: 142 MG/DL (ref 0–199)
CO2 SERPL-SCNC: 22 MMOL/L (ref 21–32)
CREAT SERPL-MCNC: 1.5 MG/DL (ref 0.8–1.3)
DEPRECATED RDW RBC AUTO: 16.7 % (ref 12.4–15.4)
EOSINOPHIL # BLD: 0.2 K/UL (ref 0–0.6)
EOSINOPHIL NFR BLD: 3 %
GFR SERPLBLD CREATININE-BSD FMLA CKD-EPI: 46 ML/MIN/{1.73_M2}
GLUCOSE SERPL-MCNC: 119 MG/DL (ref 70–99)
HCT VFR BLD AUTO: 42.2 % (ref 40.5–52.5)
HDLC SERPL-MCNC: 36 MG/DL (ref 40–60)
HGB BLD-MCNC: 14.1 G/DL (ref 13.5–17.5)
LDL CHOLESTEROL: 81 MG/DL
LYMPHOCYTES # BLD: 1.4 K/UL (ref 1–5.1)
LYMPHOCYTES NFR BLD: 22 %
MCH RBC QN AUTO: 30.8 PG (ref 26–34)
MCHC RBC AUTO-ENTMCNC: 33.3 G/DL (ref 31–36)
MCV RBC AUTO: 92.6 FL (ref 80–100)
MONOCYTES # BLD: 0.8 K/UL (ref 0–1.3)
MONOCYTES NFR BLD: 12 %
NEUTROPHILS # BLD: 4 K/UL (ref 1.7–7.7)
NEUTROPHILS NFR BLD: 62.3 %
PLATELET # BLD AUTO: 137 K/UL (ref 135–450)
PMV BLD AUTO: 11.2 FL (ref 5–10.5)
POTASSIUM SERPL-SCNC: 4.3 MMOL/L (ref 3.5–5.1)
PROT SERPL-MCNC: 6 G/DL (ref 6.4–8.2)
PSA SERPL DL<=0.01 NG/ML-MCNC: 4.03 NG/ML (ref 0–4)
RBC # BLD AUTO: 4.56 M/UL (ref 4.2–5.9)
SODIUM SERPL-SCNC: 140 MMOL/L (ref 136–145)
T4 FREE SERPL-MCNC: 1.1 NG/DL (ref 0.9–1.8)
TRIGL SERPL-MCNC: 127 MG/DL (ref 0–150)
TSH SERPL DL<=0.005 MIU/L-ACNC: 7.47 UIU/ML (ref 0.27–4.2)
VLDLC SERPL CALC-MCNC: 25 MG/DL
WBC # BLD AUTO: 6.4 K/UL (ref 4–11)

## 2024-10-24 RX ORDER — LEVOTHYROXINE SODIUM 25 UG/1
12.5 TABLET ORAL DAILY
Qty: 45 TABLET | Refills: 0 | Status: SHIPPED | OUTPATIENT
Start: 2024-10-24

## 2024-10-29 DIAGNOSIS — E11.22 TYPE 2 DIABETES MELLITUS WITH STAGE 3A CHRONIC KIDNEY DISEASE, WITHOUT LONG-TERM CURRENT USE OF INSULIN (HCC): ICD-10-CM

## 2024-10-29 DIAGNOSIS — I10 ESSENTIAL HYPERTENSION: ICD-10-CM

## 2024-10-29 DIAGNOSIS — N18.31 TYPE 2 DIABETES MELLITUS WITH STAGE 3A CHRONIC KIDNEY DISEASE, WITHOUT LONG-TERM CURRENT USE OF INSULIN (HCC): ICD-10-CM

## 2024-10-29 DIAGNOSIS — E78.2 MIXED HYPERLIPIDEMIA: ICD-10-CM

## 2024-10-30 RX ORDER — ATORVASTATIN CALCIUM 80 MG/1
TABLET, FILM COATED ORAL
Qty: 90 TABLET | Refills: 3 | Status: SHIPPED | OUTPATIENT
Start: 2024-10-30

## 2024-10-30 RX ORDER — CARVEDILOL 25 MG/1
TABLET ORAL
Qty: 180 TABLET | Refills: 3 | Status: SHIPPED | OUTPATIENT
Start: 2024-10-30

## 2024-10-30 RX ORDER — SPIRONOLACTONE 25 MG/1
TABLET ORAL
Qty: 90 TABLET | Refills: 3 | Status: SHIPPED | OUTPATIENT
Start: 2024-10-30

## 2024-10-30 NOTE — TELEPHONE ENCOUNTER
Future Appointments   Date Time Provider Department Center   12/31/2024  8:15 AM Nelsy Nobles MD [facility] DEP   1/23/2025  8:30 AM Nelsy Nobles MD [facility] DEP       Last appt 10/23/24

## 2024-11-08 ENCOUNTER — TELEPHONE (OUTPATIENT)
Dept: INTERNAL MEDICINE CLINIC | Age: 84
End: 2024-11-08

## 2024-11-08 ENCOUNTER — OFFICE VISIT (OUTPATIENT)
Dept: INTERNAL MEDICINE CLINIC | Age: 84
End: 2024-11-08

## 2024-11-08 VITALS
HEART RATE: 61 BPM | WEIGHT: 221 LBS | BODY MASS INDEX: 35.69 KG/M2 | DIASTOLIC BLOOD PRESSURE: 60 MMHG | OXYGEN SATURATION: 96 % | SYSTOLIC BLOOD PRESSURE: 128 MMHG

## 2024-11-08 DIAGNOSIS — J40 BRONCHITIS: Primary | ICD-10-CM

## 2024-11-08 DIAGNOSIS — I10 ESSENTIAL HYPERTENSION: ICD-10-CM

## 2024-11-08 DIAGNOSIS — J22 LRTI (LOWER RESPIRATORY TRACT INFECTION): ICD-10-CM

## 2024-11-08 RX ORDER — AZITHROMYCIN 250 MG/1
TABLET, FILM COATED ORAL
Qty: 6 TABLET | Refills: 0 | Status: SHIPPED | OUTPATIENT
Start: 2024-11-08 | End: 2024-11-18

## 2024-11-08 RX ORDER — METHYLPREDNISOLONE 4 MG/1
TABLET ORAL
Qty: 1 KIT | Refills: 0 | Status: SHIPPED | OUTPATIENT
Start: 2024-11-08 | End: 2024-11-14

## 2024-11-08 ASSESSMENT — ENCOUNTER SYMPTOMS
SORE THROAT: 0
COUGH: 1

## 2024-11-08 NOTE — PROGRESS NOTES
Vincenzo Abraham (:  1940) is a 83 y.o. male,, here for evaluation of the following chief complaint(s):  Congestion (Discolored ) and Cough         Assessment & Plan  Bronchitis   Acute condition, new, Supportive care with appropriate antipyretics and fluids.  Started on antibiotic and steroid.    Orders:    azithromycin (ZITHROMAX) 250 MG tablet; 500mg on day 1 followed by 250mg on days 2 - 5    methylPREDNISolone (MEDROL DOSEPACK) 4 MG tablet; Take by mouth.    LRTI (lower respiratory tract infection)    Acute condition, new, Supportive care with appropriate antipyretics and fluids.  Started on antibiotic and steroid    Orders:    azithromycin (ZITHROMAX) 250 MG tablet; 500mg on day 1 followed by 250mg on days 2 - 5    methylPREDNISolone (MEDROL DOSEPACK) 4 MG tablet; Take by mouth.    Essential hypertension   Chronic, at goal (stable), continue current treatment plan           Return if symptoms worsen or fail to improve.       Subjective   No fever  Satrted with nasal discharge and mucus production. Color in beige in color.  Since Tuesday.  Now worsening and unable to sleep.      Chest Congestion  This is a new problem. The current episode started in the past 7 days. Associated symptoms include congestion and coughing. Pertinent negatives include no fever or sore throat. He has tried nothing for the symptoms. The treatment provided mild relief.       Review of Systems   Constitutional:  Negative for fever.   HENT:  Positive for congestion. Negative for sore throat.    Respiratory:  Positive for cough.           Objective   Physical Exam  Vitals reviewed.   Constitutional:       Appearance: Normal appearance.   HENT:      Head: Normocephalic.   Eyes:      Extraocular Movements: Extraocular movements intact.      Pupils: Pupils are equal, round, and reactive to light.   Cardiovascular:      Rate and Rhythm: Normal rate.      Heart sounds: Normal heart sounds. No murmur heard.  Pulmonary:

## 2024-11-08 NOTE — TELEPHONE ENCOUNTER
Pts wife called, Vincenzo has a lot of sinus drainage and mucus. No cough, no fever    Please advise    Thank you

## 2024-11-13 ENCOUNTER — TELEPHONE (OUTPATIENT)
Dept: INTERNAL MEDICINE CLINIC | Age: 84
End: 2024-11-13

## 2024-11-13 DIAGNOSIS — J22 LRTI (LOWER RESPIRATORY TRACT INFECTION): Primary | ICD-10-CM

## 2024-11-13 DIAGNOSIS — J40 BRONCHITIS: ICD-10-CM

## 2024-11-13 NOTE — TELEPHONE ENCOUNTER
Pts wife called, Vincenzo completed his medication but he is still dealing with a lot of congestion and mucus, especially at night    Please advise    Thank you

## 2024-11-22 ENCOUNTER — OFFICE VISIT (OUTPATIENT)
Dept: INTERNAL MEDICINE CLINIC | Age: 84
End: 2024-11-22

## 2024-11-22 VITALS
DIASTOLIC BLOOD PRESSURE: 88 MMHG | HEART RATE: 68 BPM | OXYGEN SATURATION: 98 % | WEIGHT: 220 LBS | SYSTOLIC BLOOD PRESSURE: 138 MMHG | BODY MASS INDEX: 35.53 KG/M2

## 2024-11-22 DIAGNOSIS — S33.5XXA LUMBAR SPRAIN, INITIAL ENCOUNTER: Primary | ICD-10-CM

## 2024-11-22 DIAGNOSIS — I10 ESSENTIAL HYPERTENSION: ICD-10-CM

## 2024-11-22 DIAGNOSIS — M54.50 ACUTE RIGHT-SIDED LOW BACK PAIN WITHOUT SCIATICA: ICD-10-CM

## 2024-11-22 RX ORDER — TIZANIDINE 2 MG/1
2 TABLET ORAL 2 TIMES DAILY PRN
Qty: 10 TABLET | Refills: 0 | Status: SHIPPED | OUTPATIENT
Start: 2024-11-22

## 2024-11-22 ASSESSMENT — ENCOUNTER SYMPTOMS: BACK PAIN: 1

## 2024-11-22 NOTE — PROGRESS NOTES
Vincenzo Abraham (:  1940) is a 83 y.o. male,, here for evaluation of the following chief complaint(s):  Back Pain (X1wk  lower rt side )    Past medical history significant for diabetes, hypertension, hyperlipidemia, congestive diastolic heart failure, paroxysmal atrial fibrillation, thrombocytopenia    --- Follows cards- atrial fibrillation s/p ablation.     Assessment & Plan  Lumbar sprain, initial encounter   Acute condition, new, Supportive care with appropriate antipyretics and fluids.  Use of tizanidine as needed.    Orders:    tiZANidine (ZANAFLEX) 2 MG tablet; Take 1 tablet by mouth 2 times daily as needed (muscle spasm)    Acute right-sided low back pain without sciatica  New, recommend application, use of Zanaflex as needed.    Orders:    tiZANidine (ZANAFLEX) 2 MG tablet; Take 1 tablet by mouth 2 times daily as needed (muscle spasm)    Essential hypertension   Chronic, at goal (stable), continue current treatment plan carvedilol 25 mg twice daily.           Return if symptoms worsen or fail to improve.       Subjective   Back Pain  This is a new problem. The current episode started in the past 7 days. The quality of the pain is described as aching. The symptoms are aggravated by standing. Stiffness is present All day. He has tried NSAIDs for the symptoms. The treatment provided mild relief.       Review of Systems   Musculoskeletal:  Positive for back pain.          Objective   Physical Exam  Vitals reviewed.   Constitutional:       Appearance: Normal appearance.   HENT:      Head: Normocephalic.   Eyes:      Extraocular Movements: Extraocular movements intact.      Pupils: Pupils are equal, round, and reactive to light.   Cardiovascular:      Rate and Rhythm: Normal rate.      Heart sounds: Normal heart sounds. No murmur heard.  Pulmonary:      Effort: Pulmonary effort is normal.      Breath sounds: Normal breath sounds.   Abdominal:      General: Bowel sounds are normal.      Palpations:

## 2024-11-22 NOTE — ASSESSMENT & PLAN NOTE
New, recommend application, use of Zanaflex as needed.    Orders:    tiZANidine (ZANAFLEX) 2 MG tablet; Take 1 tablet by mouth 2 times daily as needed (muscle spasm)

## 2024-12-27 ENCOUNTER — OFFICE VISIT (OUTPATIENT)
Dept: INTERNAL MEDICINE CLINIC | Age: 84
End: 2024-12-27

## 2024-12-27 DIAGNOSIS — Z00.00 MEDICARE ANNUAL WELLNESS VISIT, SUBSEQUENT: Primary | ICD-10-CM

## 2024-12-27 ASSESSMENT — PATIENT HEALTH QUESTIONNAIRE - PHQ9
SUM OF ALL RESPONSES TO PHQ QUESTIONS 1-9: 0
SUM OF ALL RESPONSES TO PHQ9 QUESTIONS 1 & 2: 0
SUM OF ALL RESPONSES TO PHQ QUESTIONS 1-9: 0
2. FEELING DOWN, DEPRESSED OR HOPELESS: NOT AT ALL
SUM OF ALL RESPONSES TO PHQ QUESTIONS 1-9: 0
SUM OF ALL RESPONSES TO PHQ QUESTIONS 1-9: 0
1. LITTLE INTEREST OR PLEASURE IN DOING THINGS: NOT AT ALL

## 2024-12-27 ASSESSMENT — LIFESTYLE VARIABLES
HOW OFTEN DO YOU HAVE A DRINK CONTAINING ALCOHOL: 2-4 TIMES A MONTH
HOW MANY STANDARD DRINKS CONTAINING ALCOHOL DO YOU HAVE ON A TYPICAL DAY: 1 OR 2

## 2024-12-27 NOTE — PROGRESS NOTES
EVERY DAY Yes Nelsy Nobles MD   metFORMIN (GLUCOPHAGE) 500 MG tablet TAKE 1 TABLET EVERY DAY WITH BREAKFAST Yes Nelsy Nobles MD   atorvastatin (LIPITOR) 80 MG tablet TAKE 1 TABLET EVERY DAY Yes Nelsy Nobles MD   carvedilol (COREG) 25 MG tablet TAKE 1 TABLET TWICE DAILY WITH MEALS Yes Nelsy Nobles MD   levothyroxine (SYNTHROID) 25 MCG tablet Take 0.5 tablets by mouth daily Yes Nelsy Nobles MD   furosemide (LASIX) 20 MG tablet TAKE 1 TABLET EVERY DAY Yes Corinne Escamilla MD   blood glucose test strips (TRUE METRIX BLOOD GLUCOSE TEST) strip TEST 1 TIMES A DAY & AS NEEDED FOR SYMPTOMS OF IRREGULAR BLOOD GLUCOSE. DISPENSE SUFFICIENT AMOUNT FOR INDICATED TESTING FREQUENCY PLUS ADDITIONAL TO ACCOMMODATE PRN TESTING NEEDS. Yes Corinne Escamilla MD   meclizine (ANTIVERT) 25 MG tablet TAKE 1 TABLET THREE TIMES DAILY AS NEEDED FOR  DIZZINESS Yes Corinne Escamilla MD   apixaban (ELIQUIS) 5 MG TABS tablet Take by mouth 2 times daily Yes Radha Johnson MD   blood glucose monitor kit and supplies Dispense sufficient amount for indicated testing frequency plus additional to accommodate PRN testing needs. Dispense all needed supplies to include: monitor, strips, lancing device, lancets, control solutions, alcohol swabs. Yes Corinne Escamilla MD   Lancets MISC 1 each by Does not apply route daily Yes Corinne Escamilla MD   amiodarone (CORDARONE) 200 MG tablet TAKE 1 TABLET EVERY DAY Yes Corinne Escamilla MD   empagliflozin (JARDIANCE) 10 MG tablet Take 1 tablet by mouth daily Yes Corinne Escamilla MD   diclofenac sodium (VOLTAREN) 1 % GEL Apply 4 g topically 4 times daily as needed for Pain Yes Lio Muse MD   omeprazole (PRILOSEC) 20 MG delayed release capsule Take 1 capsule by mouth daily Yes Radha Johnson MD   aspirin EC 81 MG EC tablet Take 1 tablet by mouth daily Yes Guicho Kahn MD   gabapentin (NEURONTIN) 100 MG capsule TAKE 1 CAPSULE

## 2024-12-27 NOTE — PATIENT INSTRUCTIONS
be eating something different.  Find what works best for you. If you do not have time or do not like to cook, a program that offers meal replacement bars or shakes may be better for you. Or if you like to prepare meals, finding a plan that includes daily menus and recipes may be best.  Ask your doctor about other health professionals who can help you achieve your weight loss goals.  A dietitian can help you make healthy changes in your diet.  An exercise specialist or  can help you develop a safe and effective exercise program.  A counselor or psychiatrist can help you cope with issues such as depression, anxiety, or family problems that can make it hard to focus on weight loss.  Consider joining a support group for people who are trying to lose weight. Your doctor can suggest groups in your area.  Where can you learn more?  Go to https://www.[employer]ift[employer].net/patientEd and enter U357 to learn more about \"Starting a Weight Loss Plan: Care Instructions.\"  Current as of: September 20, 2023  Content Version: 14.2  © 2024 [employer].   Care instructions adapted under license by [employer]. If you have questions about a medical condition or this instruction, always ask your healthcare professional. [employer] disclaims any warranty or liability for your use of this information.           A Healthy Heart: Care Instructions  Overview     Coronary artery disease, also called heart disease, occurs when a substance called plaque builds up in the vessels that supply oxygen-rich blood to your heart muscle. This can narrow the blood vessels and reduce blood flow. A heart attack happens when blood flow is completely blocked. A high-fat diet, smoking, and other factors increase the risk of heart disease.  Your doctor has found that you have a chance of having heart disease. A heart-healthy lifestyle can help keep your heart healthy and prevent heart disease. This lifestyle includes eating

## 2025-01-08 ENCOUNTER — OFFICE VISIT (OUTPATIENT)
Dept: INTERNAL MEDICINE CLINIC | Age: 85
End: 2025-01-08

## 2025-01-08 VITALS
HEART RATE: 64 BPM | SYSTOLIC BLOOD PRESSURE: 122 MMHG | BODY MASS INDEX: 35.36 KG/M2 | WEIGHT: 220 LBS | HEIGHT: 66 IN | TEMPERATURE: 97.8 F | OXYGEN SATURATION: 95 % | DIASTOLIC BLOOD PRESSURE: 62 MMHG

## 2025-01-08 DIAGNOSIS — J22 LOWER RESPIRATORY INFECTION: Primary | ICD-10-CM

## 2025-01-08 RX ORDER — ALBUTEROL SULFATE 90 UG/1
2 INHALANT RESPIRATORY (INHALATION) 4 TIMES DAILY PRN
Qty: 54 G | Refills: 1 | Status: SHIPPED | OUTPATIENT
Start: 2025-01-08

## 2025-01-08 RX ORDER — BENZONATATE 100 MG/1
100-200 CAPSULE ORAL 3 TIMES DAILY PRN
Qty: 60 CAPSULE | Refills: 0 | Status: SHIPPED | OUTPATIENT
Start: 2025-01-08 | End: 2025-01-22

## 2025-01-08 RX ORDER — DOXYCYCLINE HYCLATE 100 MG
100 TABLET ORAL 2 TIMES DAILY
Qty: 14 TABLET | Refills: 0 | Status: SHIPPED | OUTPATIENT
Start: 2025-01-08 | End: 2025-01-15

## 2025-01-08 SDOH — ECONOMIC STABILITY: FOOD INSECURITY: WITHIN THE PAST 12 MONTHS, YOU WORRIED THAT YOUR FOOD WOULD RUN OUT BEFORE YOU GOT MONEY TO BUY MORE.: NEVER TRUE

## 2025-01-08 SDOH — ECONOMIC STABILITY: FOOD INSECURITY: WITHIN THE PAST 12 MONTHS, THE FOOD YOU BOUGHT JUST DIDN'T LAST AND YOU DIDN'T HAVE MONEY TO GET MORE.: NEVER TRUE

## 2025-01-08 ASSESSMENT — ENCOUNTER SYMPTOMS
TROUBLE SWALLOWING: 0
SORE THROAT: 1
WHEEZING: 1
NAUSEA: 0
PHOTOPHOBIA: 0
DIARRHEA: 0
ABDOMINAL PAIN: 0
VOICE CHANGE: 0
SHORTNESS OF BREATH: 1
CONSTIPATION: 0
COLOR CHANGE: 0
VOMITING: 0
COUGH: 1

## 2025-01-08 ASSESSMENT — PATIENT HEALTH QUESTIONNAIRE - PHQ9
SUM OF ALL RESPONSES TO PHQ9 QUESTIONS 1 & 2: 0
SUM OF ALL RESPONSES TO PHQ QUESTIONS 1-9: 0
2. FEELING DOWN, DEPRESSED OR HOPELESS: NOT AT ALL
1. LITTLE INTEREST OR PLEASURE IN DOING THINGS: NOT AT ALL

## 2025-01-08 NOTE — PROGRESS NOTES
Allergic contact dermatitis due to plants, except food 04/29/2020    Generalized abdominal pain 06/09/2021    Fever 06/09/2021    Continuous severe abdominal pain 06/09/2021    Acute cholecystitis     Type 2 diabetes mellitus with obesity ([facility]) 06/14/2021    Acute respiratory failure 09/18/2022    Acute on chronic systolic (congestive) heart failure (HCC) 09/18/2022     Past Medical History:   Diagnosis Date    CHF (congestive heart failure) ([facility])     Diverticulosis     Hypertension     Obesity     Unspecified sleep apnea          He presents to the office today for complaints of shortness of breath, thick beige sputum, congestion, cough, and fatigue that started about a week ago. Denies fevers, chills, nausea, vomiting, diarrhea, chest pain, peripheral swelling or known ill exposures. He has been taking Dayquil, Mucinex, and tylenol without relief in his symptoms. Cough seems to be worse at night and keeps him up at night.      Assessment & Plan  Lower respiratory infection   Acute condition, new,    - Start doxycyline twice daily for seven days.   - Take tessalon perles every 8 hours as needed for cough.   - Follow up with Chest XRAY if symptoms persist or do not improve.          Return in about 15 days (around 1/23/2025).       Subjective       Review of Systems   Constitutional:  Positive for fatigue. Negative for appetite change, fever and unexpected weight change.   HENT:  Positive for congestion and sore throat (with start of symptoms- now resolved). Negative for trouble swallowing and voice change.    Eyes:  Negative for photophobia and visual disturbance.   Respiratory:  Positive for cough, shortness of breath and wheezing.    Cardiovascular:  Negative for chest pain, palpitations and leg swelling.   Gastrointestinal:  Negative for abdominal pain, constipation, diarrhea, nausea and vomiting.   Endocrine: Negative for polyphagia and polyuria.   Genitourinary:  Negative for decreased urine volume, difficulty

## 2025-01-22 ASSESSMENT — ENCOUNTER SYMPTOMS
COUGH: 0
NAUSEA: 0
ABDOMINAL PAIN: 0
BLOOD IN STOOL: 0
SORE THROAT: 0
SHORTNESS OF BREATH: 0
VOMITING: 0

## 2025-01-22 NOTE — PROGRESS NOTES
Vincenzo Abraham (:  1940) is a 84 y.o. male,, here for evaluation of the following chief complaint(s):  Diabetes       Past medical history significant for diabetes, hypertension, hyperlipidemia, congestive diastolic heart failure, paroxysmal atrial fibrillation, thrombocytopenia    -- For his lumbar pain- Follows orthopaedics     --- Follows cards- atrial fibrillation s/p ablation.         Assessment & Plan    1. Acquired hypothyroidism  Has started taking Synthyroid 12.5 mcg daily since 3 months.  Repeat labs ordered.  Continues to be elevated, will increase the dose of Synthyroid to 25 mcg daily.  - TSH reflex to FT4; Future  - levothyroxine (SYNTHROID) 25 MCG tablet; Take 0.5 tablets by mouth daily  Dispense: 45 tablet; Refill: 0  - TSH reflex to FT4    2. Type 2 diabetes mellitus with stage 3a chronic kidney disease, without long-term current use of insulin (HCC)  Stable HbA1c 6.  Continue with the current dose of Jardiance and metformin.  Continue with Lipitor 80 mg daily.  Currently on Aldactone  - Albumin/Creatinine Ratio, Urine; Future  - Albumin/Creatinine Ratio, Urine    3. Chronic diastolic CHF (congestive heart failure) (HCC)  Stable  Being monitored by cardiologist.    4. Paroxysmal atrial fibrillation (HCC)  Stable  Being monitored by cardiologist, continue with Eliquis, amiodarone and Coreg.    5. Acute cough  Improving, patient has completed the course of doxycycline but continues to feel trouble with producing phlegm, start on Mucinex.  - guaiFENesin (MUCINEX) 600 MG extended release tablet; Take 1 tablet by mouth 2 times daily for 15 days  Dispense: 30 tablet; Refill: 0    6. Screening PSA (prostate specific antigen)  Slightly abnormal PSA result.  Repeat levels ordered.  - PSA Screening; Future  - PSA Screening    7. Morbid (severe) obesity due to excess calories (E66.01)  Counseling provided on low carbs diet and exercise 150 minutes/week.    8. Body mass index [BMI] 36.0-36.9,

## 2025-01-23 ENCOUNTER — OFFICE VISIT (OUTPATIENT)
Dept: INTERNAL MEDICINE CLINIC | Age: 85
End: 2025-01-23

## 2025-01-23 VITALS
SYSTOLIC BLOOD PRESSURE: 120 MMHG | OXYGEN SATURATION: 96 % | WEIGHT: 224 LBS | HEART RATE: 62 BPM | DIASTOLIC BLOOD PRESSURE: 60 MMHG | BODY MASS INDEX: 36.17 KG/M2

## 2025-01-23 DIAGNOSIS — E11.22 TYPE 2 DIABETES MELLITUS WITH STAGE 3A CHRONIC KIDNEY DISEASE, WITHOUT LONG-TERM CURRENT USE OF INSULIN (HCC): Primary | ICD-10-CM

## 2025-01-23 DIAGNOSIS — N18.31 TYPE 2 DIABETES MELLITUS WITH STAGE 3A CHRONIC KIDNEY DISEASE, WITHOUT LONG-TERM CURRENT USE OF INSULIN (HCC): Primary | ICD-10-CM

## 2025-01-23 DIAGNOSIS — Z12.5 SCREENING PSA (PROSTATE SPECIFIC ANTIGEN): ICD-10-CM

## 2025-01-23 DIAGNOSIS — I48.0 PAROXYSMAL ATRIAL FIBRILLATION (HCC): ICD-10-CM

## 2025-01-23 DIAGNOSIS — R05.1 ACUTE COUGH: ICD-10-CM

## 2025-01-23 DIAGNOSIS — I50.32 CHRONIC DIASTOLIC CHF (CONGESTIVE HEART FAILURE) (HCC): ICD-10-CM

## 2025-01-23 DIAGNOSIS — E03.9 ACQUIRED HYPOTHYROIDISM: ICD-10-CM

## 2025-01-23 DIAGNOSIS — E66.01 MORBID (SEVERE) OBESITY DUE TO EXCESS CALORIES: ICD-10-CM

## 2025-01-23 LAB
CREAT UR-MCNC: 85.5 MG/DL (ref 39–259)
HBA1C MFR BLD: 6 %
MICROALBUMIN UR DL<=1MG/L-MCNC: <1.2 MG/DL
MICROALBUMIN/CREAT UR: NORMAL MG/G (ref 0–30)
PSA SERPL DL<=0.01 NG/ML-MCNC: 4.75 NG/ML (ref 0–4)
T4 FREE SERPL-MCNC: 1.2 NG/DL (ref 0.9–1.8)
TSH SERPL DL<=0.005 MIU/L-ACNC: 6.23 UIU/ML (ref 0.27–4.2)

## 2025-01-23 RX ORDER — LEVOTHYROXINE SODIUM 25 UG/1
25 TABLET ORAL DAILY
Qty: 90 TABLET | Refills: 0 | Status: SHIPPED | OUTPATIENT
Start: 2025-01-23

## 2025-01-23 RX ORDER — LEVOTHYROXINE SODIUM 25 UG/1
12.5 TABLET ORAL DAILY
Qty: 45 TABLET | Status: CANCELLED | OUTPATIENT
Start: 2025-01-23

## 2025-01-23 RX ORDER — GUAIFENESIN 600 MG/1
600 TABLET, EXTENDED RELEASE ORAL 2 TIMES DAILY
Qty: 30 TABLET | Refills: 0 | Status: SHIPPED | OUTPATIENT
Start: 2025-01-23 | End: 2025-02-07

## 2025-01-23 RX ORDER — LEVOTHYROXINE SODIUM 25 UG/1
12.5 TABLET ORAL DAILY
Qty: 45 TABLET | Refills: 0 | Status: SHIPPED | OUTPATIENT
Start: 2025-01-23 | End: 2025-01-23

## 2025-03-04 DIAGNOSIS — E03.9 ACQUIRED HYPOTHYROIDISM: ICD-10-CM

## 2025-03-04 RX ORDER — LEVOTHYROXINE SODIUM 25 UG/1
25 TABLET ORAL DAILY
Qty: 90 TABLET | Refills: 0 | Status: SHIPPED | OUTPATIENT
Start: 2025-03-04

## 2025-03-26 RX ORDER — FUROSEMIDE 20 MG/1
20 TABLET ORAL DAILY
Qty: 90 TABLET | Refills: 0 | Status: SHIPPED | OUTPATIENT
Start: 2025-03-26

## 2025-03-26 NOTE — TELEPHONE ENCOUNTER
Future Appointments   Date Time Provider Department Center   4/23/2025  9:00 AM Nelsy Nobles MD [facility] ECC DEP

## 2025-04-17 ENCOUNTER — OFFICE VISIT (OUTPATIENT)
Dept: INTERNAL MEDICINE CLINIC | Age: 85
End: 2025-04-17

## 2025-04-17 DIAGNOSIS — Z00.00 MEDICARE ANNUAL WELLNESS VISIT, SUBSEQUENT: Primary | ICD-10-CM

## 2025-04-17 ASSESSMENT — LIFESTYLE VARIABLES
HOW MANY STANDARD DRINKS CONTAINING ALCOHOL DO YOU HAVE ON A TYPICAL DAY: 1 OR 2
HOW OFTEN DO YOU HAVE A DRINK CONTAINING ALCOHOL: MONTHLY OR LESS

## 2025-04-17 ASSESSMENT — PATIENT HEALTH QUESTIONNAIRE - PHQ9
SUM OF ALL RESPONSES TO PHQ QUESTIONS 1-9: 0
2. FEELING DOWN, DEPRESSED OR HOPELESS: NOT AT ALL
SUM OF ALL RESPONSES TO PHQ QUESTIONS 1-9: 0
1. LITTLE INTEREST OR PLEASURE IN DOING THINGS: NOT AT ALL

## 2025-04-17 NOTE — PATIENT INSTRUCTIONS
or in one or both shoulders or arms.     Lightheadedness or sudden weakness.     A fast or irregular heartbeat.   After you call 911, the  may tell you to chew 1 adult-strength or 2 to 4 low-dose aspirin. Wait for an ambulance. Do not try to drive yourself.  Watch closely for changes in your health, and be sure to contact your doctor if you have any problems.  Where can you learn more?  Go to https://www.[employer].net/patientEd and enter F075 to learn more about \"A Healthy Heart: Care Instructions.\"  Current as of: July 31, 2024  Content Version: 14.4  © 8409-4802 [employer].   Care instructions adapted under license by [employer]. If you have questions about a medical condition or this instruction, always ask your healthcare professional. [employer] disclaims any warranty or liability for your use of this information.    Personalized Preventive Plan for Vincenzo Abraham - 4/17/2025  Medicare offers a range of preventive health benefits. Some of the tests and screenings are paid in full while other may be subject to a deductible, co-insurance, and/or copay.  Some of these benefits include a comprehensive review of your medical history including lifestyle, illnesses that may run in your family, and various assessments and screenings as appropriate.  After reviewing your medical record and screening and assessments performed today your provider may have ordered immunizations, labs, imaging, and/or referrals for you.  A list of these orders (if applicable) as well as your Preventive Care list are included within your After Visit Summary for your review.

## 2025-04-17 NOTE — PROGRESS NOTES
Medicare Annual Wellness Visit    Vincenzo Abraham is here for Medicare AWV    Assessment & Plan   Medicare annual wellness visit, subsequent     No follow-ups on file.     Subjective       Patient's complete Health Risk Assessment and screening values have been reviewed and are found in Flowsheets. The following problems were reviewed today and where indicated follow up appointments were made and/or referrals ordered.    Positive Risk Factor Screenings with Interventions:                Abnormal BMI (obese):  There is no height or weight on file to calculate BMI. (!) Abnormal  Interventions:  low carbohydrate diet, exercise for at least 150 minutes/week          Vision Screen:  Do you have difficulty driving, watching TV, or doing any of your daily activities because of your eyesight?: No  Have you had an eye exam within the past year?: (!) No  Interventions:   Patient encouraged to make appointment with their eye specialist    Safety:  Do you have non-slip mats or non-slip surfaces or shower bars or grab bars in your shower or bathtub?: (!) No  Interventions:  Patient comments: Reports he only has meds but does not have bars.  Agreeable to have them installed.  See AVS for additional education material                   Objective   There were no vitals filed for this visit.   There is no height or weight on file to calculate BMI.                  No Known Allergies  Prior to Visit Medications    Medication Sig Taking? Authorizing Provider   furosemide (LASIX) 20 MG tablet TAKE 1 TABLET EVERY DAY  Nelsy Nobles MD   levothyroxine (SYNTHROID) 25 MCG tablet Take 1 tablet by mouth daily  Nelsy oNbles MD   albuterol sulfate HFA (VENTOLIN HFA) 108 (90 Base) MCG/ACT inhaler Inhale 2 puffs into the lungs 4 times daily as needed for Wheezing (rinse after use.)  Amita Mejía, ARACELY - CNP   tiZANidine (ZANAFLEX) 2 MG tablet Take 1 tablet by mouth 2 times daily as needed (muscle spasm)  Nelsy Nobles MD   spironolactone

## 2025-04-23 ASSESSMENT — ENCOUNTER SYMPTOMS
SHORTNESS OF BREATH: 0
COUGH: 0
VOMITING: 0
BLOOD IN STOOL: 0
SORE THROAT: 0
NAUSEA: 0
ABDOMINAL PAIN: 0

## 2025-04-23 NOTE — PROGRESS NOTES
Vincenzo Abraham (:  1940) is a 84 y.o. male,, here for evaluation of the following chief complaint(s):  Diabetes       Past medical history significant for diabetes, hypertension, hyperlipidemia, congestive diastolic heart failure, paroxysmal atrial fibrillation, thrombocytopenia, b/l bunion    -- For his lumbar pain- Follows orthopaedics     --- Follows cards- atrial fibrillation s/p ablation.         Assessment & Plan    1. Type 2 diabetes mellitus with stage 3a chronic kidney disease, without long-term current use of insulin (HCC)  Stable HbA1c 6.5.  Continue with the current dose of Jardiance and metformin.  Continue with Lipitor 80 mg daily.  Currently on Aldactone.  Labs ordered  Visual inspection:  Deformity/amputation: Bilateral bunions.  Skin lesions/pre-ulcerative calluses: absent  Edema: right- negative, left- negative    Sensory exam:  Monofilament sensation: normal  (minimum of 5 random plantar locations tested, avoiding callused areas - > 1 area with absence of sensation is + for neuropathy)    Plus at least one of the following:  Pulses: normal,   Pinprick: Intact  Proprioception: Intact  - TSH reflex to FT4; Future  - Comprehensive Metabolic Panel; Future  - POCT glycosylated hemoglobin (Hb A1C)  -  DIABETES FOOT EXAM  - Comprehensive Metabolic Panel  - TSH reflex to FT4    2. Acquired hypothyroidism  Stable  Continue with Synthyroid 25 mcg daily pending labs.  - TSH reflex to FT4; Future  - Comprehensive Metabolic Panel; Future  - Comprehensive Metabolic Panel  - TSH reflex to FT4    3. Primary hypertension  Stable  Continue with Coreg 25 mg twice daily.  Being monitored by cardiologist.  - TSH reflex to FT4; Future  - Comprehensive Metabolic Panel; Future  - Comprehensive Metabolic Panel  - TSH reflex to FT4    4. Paroxysmal atrial fibrillation (HCC)  Being monitored by cardiologist, continue with Eliquis, amiodarone and Coreg.  - TSH reflex to FT4; Future  - Comprehensive Metabolic

## 2025-04-24 ENCOUNTER — RESULTS FOLLOW-UP (OUTPATIENT)
Dept: INTERNAL MEDICINE CLINIC | Age: 85
End: 2025-04-24

## 2025-04-24 ENCOUNTER — OFFICE VISIT (OUTPATIENT)
Dept: INTERNAL MEDICINE CLINIC | Age: 85
End: 2025-04-24

## 2025-04-24 VITALS
SYSTOLIC BLOOD PRESSURE: 118 MMHG | BODY MASS INDEX: 35.04 KG/M2 | WEIGHT: 217 LBS | HEART RATE: 65 BPM | OXYGEN SATURATION: 90 % | DIASTOLIC BLOOD PRESSURE: 70 MMHG

## 2025-04-24 DIAGNOSIS — E11.22 TYPE 2 DIABETES MELLITUS WITH STAGE 3A CHRONIC KIDNEY DISEASE, WITHOUT LONG-TERM CURRENT USE OF INSULIN (HCC): Primary | ICD-10-CM

## 2025-04-24 DIAGNOSIS — M21.619 BUNION OF GREAT TOE: ICD-10-CM

## 2025-04-24 DIAGNOSIS — I10 PRIMARY HYPERTENSION: ICD-10-CM

## 2025-04-24 DIAGNOSIS — E03.9 ACQUIRED HYPOTHYROIDISM: ICD-10-CM

## 2025-04-24 DIAGNOSIS — N18.31 TYPE 2 DIABETES MELLITUS WITH STAGE 3A CHRONIC KIDNEY DISEASE, WITHOUT LONG-TERM CURRENT USE OF INSULIN (HCC): Primary | ICD-10-CM

## 2025-04-24 DIAGNOSIS — I48.0 PAROXYSMAL ATRIAL FIBRILLATION (HCC): ICD-10-CM

## 2025-04-24 LAB
ALBUMIN SERPL-MCNC: 3.9 G/DL (ref 3.4–5)
ALBUMIN/GLOB SERPL: 1.7 {RATIO} (ref 1.1–2.2)
ALP SERPL-CCNC: 67 U/L (ref 40–129)
ALT SERPL-CCNC: 49 U/L (ref 10–40)
ANION GAP SERPL CALCULATED.3IONS-SCNC: 12 MMOL/L (ref 3–16)
AST SERPL-CCNC: 44 U/L (ref 15–37)
BILIRUB SERPL-MCNC: 0.6 MG/DL (ref 0–1)
BUN SERPL-MCNC: 29 MG/DL (ref 7–20)
CALCIUM SERPL-MCNC: 8.8 MG/DL (ref 8.3–10.6)
CHLORIDE SERPL-SCNC: 103 MMOL/L (ref 99–110)
CO2 SERPL-SCNC: 25 MMOL/L (ref 21–32)
CREAT SERPL-MCNC: 1.4 MG/DL (ref 0.8–1.3)
GFR SERPLBLD CREATININE-BSD FMLA CKD-EPI: 50 ML/MIN/{1.73_M2}
GLUCOSE SERPL-MCNC: 136 MG/DL (ref 70–99)
HBA1C MFR BLD: 6.5 %
POTASSIUM SERPL-SCNC: 4.6 MMOL/L (ref 3.5–5.1)
PROT SERPL-MCNC: 6.2 G/DL (ref 6.4–8.2)
SODIUM SERPL-SCNC: 140 MMOL/L (ref 136–145)
T4 FREE SERPL-MCNC: 1.3 NG/DL (ref 0.9–1.8)
TSH SERPL DL<=0.005 MIU/L-ACNC: 5.29 UIU/ML (ref 0.27–4.2)

## 2025-04-24 RX ORDER — LEVOTHYROXINE SODIUM 50 UG/1
50 TABLET ORAL DAILY
Qty: 90 TABLET | Refills: 1 | Status: SHIPPED | OUTPATIENT
Start: 2025-04-24

## 2025-06-08 NOTE — TELEPHONE ENCOUNTER
Medication:   Requested Prescriptions     Pending Prescriptions Disp Refills    furosemide (LASIX) 20 MG tablet [Pharmacy Med Name: Furosemide Oral Tablet 20 MG] 90 tablet 3     Sig: TAKE 1 TABLET EVERY DAY       Last Filled:      Patient Phone Number: [phone] (home)     Last appt: 4/24/2025   Next appt: 7/16/2025  Future Appointments   Date Time Provider Department Center   7/16/2025  8:45 AM Nelsy Nobles MD [facility] DEP        to get stronger

## 2025-06-09 RX ORDER — FUROSEMIDE 20 MG/1
20 TABLET ORAL DAILY
Qty: 90 TABLET | Refills: 0 | Status: SHIPPED | OUTPATIENT
Start: 2025-06-09

## 2025-07-14 NOTE — PROGRESS NOTES
Vincenzo Abraham (:  1940) is a 84 y.o. male,, here for evaluation of the following chief complaint(s):  Diabetes       Past medical history significant for diabetes, hypertension, hyperlipidemia, congestive diastolic heart failure, paroxysmal atrial fibrillation, thrombocytopenia, b/l bunion    -- For his lumbar pain- Follows orthopaedics     --- Follows cards- atrial fibrillation s/p ablation.         Assessment & Plan    1. 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  Continue with the current dose of Jardiance and metformin.  Continue with Lipitor 80 mg daily.  Currently on Aldactone.      2. Primary hypertension  Stable  Continue with Coreg 25 mg twice daily.  Being monitored by cardiologist.    3. Acquired hypothyroidism  I recently, Synthyroid dose was increased to 50 mcg daily, continue current dose pending labs.  - TSH reflex to FT4; Future  - TSH reflex to FT4    4. Prostate cancer screening  Repeat labs ordered.  - PSA Screening; Future  - PSA Screening    5. Longstanding persistent atrial fibrillation (HCC)  Being monitored by cardiologist, continue with Eliquis, amiodarone and Coreg.    6. Chronic right-sided low back pain without sciatica  Occasionally gets lumbar spine strain which improves after taking Tylenol.  Back exercises discussed with patient.       Return in about 4 months (around 2025) for Diabetes.       Subjective   Hypertension  This is a chronic problem. The current episode started more than 1 year ago. The problem is controlled. Pertinent negatives include no chest pain, palpitations or shortness of breath. Risk factors for coronary artery disease include dyslipidemia and male gender. Past treatments include diuretics and beta blockers. The current treatment provides significant improvement. There are no compliance problems.    Hyperlipidemia  This is a chronic problem. The current

## 2025-07-16 ENCOUNTER — OFFICE VISIT (OUTPATIENT)
Dept: INTERNAL MEDICINE CLINIC | Age: 85
End: 2025-07-16
Payer: MEDICARE

## 2025-07-16 VITALS
DIASTOLIC BLOOD PRESSURE: 82 MMHG | BODY MASS INDEX: 35.36 KG/M2 | OXYGEN SATURATION: 97 % | SYSTOLIC BLOOD PRESSURE: 128 MMHG | HEART RATE: 56 BPM | WEIGHT: 219 LBS

## 2025-07-16 DIAGNOSIS — E11.22 TYPE 2 DIABETES MELLITUS WITH STAGE 3A CHRONIC KIDNEY DISEASE, WITHOUT LONG-TERM CURRENT USE OF INSULIN (HCC): Primary | ICD-10-CM

## 2025-07-16 DIAGNOSIS — I48.11 LONGSTANDING PERSISTENT ATRIAL FIBRILLATION (HCC): ICD-10-CM

## 2025-07-16 DIAGNOSIS — I10 PRIMARY HYPERTENSION: ICD-10-CM

## 2025-07-16 DIAGNOSIS — M54.50 CHRONIC RIGHT-SIDED LOW BACK PAIN WITHOUT SCIATICA: ICD-10-CM

## 2025-07-16 DIAGNOSIS — E03.9 ACQUIRED HYPOTHYROIDISM: ICD-10-CM

## 2025-07-16 DIAGNOSIS — G89.29 CHRONIC RIGHT-SIDED LOW BACK PAIN WITHOUT SCIATICA: ICD-10-CM

## 2025-07-16 DIAGNOSIS — N18.31 TYPE 2 DIABETES MELLITUS WITH STAGE 3A CHRONIC KIDNEY DISEASE, WITHOUT LONG-TERM CURRENT USE OF INSULIN (HCC): Primary | ICD-10-CM

## 2025-07-16 DIAGNOSIS — Z12.5 PROSTATE CANCER SCREENING: ICD-10-CM

## 2025-07-16 LAB
PSA SERPL DL<=0.01 NG/ML-MCNC: 5.57 NG/ML (ref 0–4)
TSH SERPL DL<=0.005 MIU/L-ACNC: 3.63 UIU/ML (ref 0.27–4.2)

## 2025-07-16 PROCEDURE — G8427 DOCREV CUR MEDS BY ELIG CLIN: HCPCS | Performed by: STUDENT IN AN ORGANIZED HEALTH CARE EDUCATION/TRAINING PROGRAM

## 2025-07-16 PROCEDURE — 1036F TOBACCO NON-USER: CPT | Performed by: STUDENT IN AN ORGANIZED HEALTH CARE EDUCATION/TRAINING PROGRAM

## 2025-07-16 PROCEDURE — 3074F SYST BP LT 130 MM HG: CPT | Performed by: STUDENT IN AN ORGANIZED HEALTH CARE EDUCATION/TRAINING PROGRAM

## 2025-07-16 PROCEDURE — 99214 OFFICE O/P EST MOD 30 MIN: CPT | Performed by: STUDENT IN AN ORGANIZED HEALTH CARE EDUCATION/TRAINING PROGRAM

## 2025-07-16 PROCEDURE — 3044F HG A1C LEVEL LT 7.0%: CPT | Performed by: STUDENT IN AN ORGANIZED HEALTH CARE EDUCATION/TRAINING PROGRAM

## 2025-07-16 PROCEDURE — 1159F MED LIST DOCD IN RCRD: CPT | Performed by: STUDENT IN AN ORGANIZED HEALTH CARE EDUCATION/TRAINING PROGRAM

## 2025-07-16 PROCEDURE — 1123F ACP DISCUSS/DSCN MKR DOCD: CPT | Performed by: STUDENT IN AN ORGANIZED HEALTH CARE EDUCATION/TRAINING PROGRAM

## 2025-07-16 PROCEDURE — G8417 CALC BMI ABV UP PARAM F/U: HCPCS | Performed by: STUDENT IN AN ORGANIZED HEALTH CARE EDUCATION/TRAINING PROGRAM

## 2025-07-16 PROCEDURE — 3079F DIAST BP 80-89 MM HG: CPT | Performed by: STUDENT IN AN ORGANIZED HEALTH CARE EDUCATION/TRAINING PROGRAM

## 2025-07-16 PROCEDURE — 36415 COLL VENOUS BLD VENIPUNCTURE: CPT | Performed by: STUDENT IN AN ORGANIZED HEALTH CARE EDUCATION/TRAINING PROGRAM

## 2025-07-16 PROCEDURE — G2211 COMPLEX E/M VISIT ADD ON: HCPCS | Performed by: STUDENT IN AN ORGANIZED HEALTH CARE EDUCATION/TRAINING PROGRAM

## 2025-08-21 DIAGNOSIS — N18.31 TYPE 2 DIABETES MELLITUS WITH STAGE 3A CHRONIC KIDNEY DISEASE, WITHOUT LONG-TERM CURRENT USE OF INSULIN (HCC): ICD-10-CM

## 2025-08-21 DIAGNOSIS — E11.22 TYPE 2 DIABETES MELLITUS WITH STAGE 3A CHRONIC KIDNEY DISEASE, WITHOUT LONG-TERM CURRENT USE OF INSULIN (HCC): ICD-10-CM

## 2025-08-21 RX ORDER — FUROSEMIDE 20 MG/1
20 TABLET ORAL DAILY
Qty: 90 TABLET | Refills: 1 | Status: SHIPPED | OUTPATIENT
Start: 2025-08-21

## (undated) DEVICE — GLOVE ORANGE PI 7   MSG9070

## (undated) DEVICE — INDICATED FOR USE DURING OPEN AND LAPAROSCOPIC CHOLECYSTECTOMY PROCEDURES TO INJECT RADIOPAQUE MEDIA THROUGH THE CYSTIC DUCT INTO THE BILIARY TREE.: Brand: AEROSTAT®

## (undated) DEVICE — TROCAR: Brand: KII FIOS FIRST ENTRY

## (undated) DEVICE — MERCY HEALTH WEST TURNOVER: Brand: MEDLINE INDUSTRIES, INC.

## (undated) DEVICE — SET INSUF TUBE HEAT ISO CONN DISP

## (undated) DEVICE — APPLIER CLP M/L SHFT DIA5MM 15 LIG LIGAMAX 5

## (undated) DEVICE — AGENT HEMSTAT W4XL8IN OXIDIZED REGENERATED CELOS ABSRB

## (undated) DEVICE — SOLUTION IV IRRIG POUR BRL 0.9% SODIUM CHL 2F7124

## (undated) DEVICE — SOLUTION IV 1000ML 0.9% SOD CHL FOR IRRIG PLAS CONT

## (undated) DEVICE — SNARE ENDOSCP L240CM LOOP W13MM DIA2.4MM SHT THROW SM OVL

## (undated) DEVICE — GAUZE,SPONGE,2"X2",8PLY,STERILE,LF,2'S: Brand: MEDLINE

## (undated) DEVICE — SUTURE VCRL SZ 4-0 L18IN ABSRB UD L19MM PS-2 3/8 CIR PRIM J496H

## (undated) DEVICE — 3M™ TEGADERM™ TRANSPARENT FILM DRESSING FRAME STYLE, 1624W, 2-3/8 IN X 2-3/4 IN (6 CM X 7 CM), 100/CT 4CT/CASE: Brand: 3M™ TEGADERM™

## (undated) DEVICE — DRAPE,LAP,CHOLE,W/TROUGHS,STERILE: Brand: MEDLINE

## (undated) DEVICE — NEEDLE HYPO 25GA L1.5IN BVL ORIENTED ECLIPSE

## (undated) DEVICE — FORCEPS BX 240CM 2.4MM L NDL RAD JAW 4 M00513334

## (undated) DEVICE — TISSUE RETRIEVAL SYSTEM: Brand: INZII RETRIEVAL SYSTEM

## (undated) DEVICE — SUTURE SZ 0 27IN 5/8 CIR UR-6  TAPER PT VIOLET ABSRB VICRYL J603H

## (undated) DEVICE — SYRINGE 20ML LL S/C 50

## (undated) DEVICE — LAPAROSCOPY PK

## (undated) DEVICE — C-ARM: Brand: UNBRANDED

## (undated) DEVICE — COVER LT HNDL BLU PLAS

## (undated) DEVICE — 3M™ STERI-STRIP™ COMPOUND BENZOIN TINCTURE 40 BAGS/CARTON 4 CARTONS/CASE C1544: Brand: 3M™ STERI-STRIP™

## (undated) DEVICE — TROCARS: Brand: KII® BALLOON BLUNT TIP SYSTEM

## (undated) DEVICE — APPLICATOR MEDICATED 26 CC SOLUTION HI LT ORNG CHLORAPREP

## (undated) DEVICE — SYRINGE MED 30ML STD CLR PLAS LUERLOCK TIP N CTRL DISP

## (undated) DEVICE — Device

## (undated) DEVICE — GARMENT COMPR STD FOR 17IN CALF UNIF THER FLOTRN

## (undated) DEVICE — SUTURE PDS II SZ 0 L36IN ABSRB VLT L36MM CT-1 1/2 CIR Z346H

## (undated) DEVICE — ELECTRODE PT RET AD L9FT HI MOIST COND ADH HYDRGEL CORDED

## (undated) DEVICE — CANISTER, RIGID, 1200CC: Brand: MEDLINE INDUSTRIES, INC.

## (undated) DEVICE — STRIP,CLOSURE,WOUND,MEDI-STRIP,1/2X4: Brand: MEDLINE

## (undated) DEVICE — ADTEC SINGLE USE HOOK SCISSORS, SHAFT ONLY, MONOPOLAR, STRAIGHT, WORKING LENGTH: 12 1/4", (310 MM), DIAM. 5 MM, BLUNT/BLUNT, INSULATED, SINGLE ACTION, STERILE, DISPOSABLE, PACKAGE OF 10 PIECES: Brand: AESCULAP

## (undated) DEVICE — TROCAR: Brand: KII SLEEVE